# Patient Record
Sex: FEMALE | Race: WHITE | NOT HISPANIC OR LATINO | Employment: FULL TIME | URBAN - NONMETROPOLITAN AREA
[De-identification: names, ages, dates, MRNs, and addresses within clinical notes are randomized per-mention and may not be internally consistent; named-entity substitution may affect disease eponyms.]

---

## 2025-01-15 ENCOUNTER — APPOINTMENT (OUTPATIENT)
Dept: CARDIOLOGY | Facility: HOSPITAL | Age: 50
End: 2025-01-15

## 2025-01-15 ENCOUNTER — APPOINTMENT (OUTPATIENT)
Dept: RADIOLOGY | Facility: HOSPITAL | Age: 50
End: 2025-01-15

## 2025-01-15 ENCOUNTER — HOSPITAL ENCOUNTER (EMERGENCY)
Facility: HOSPITAL | Age: 50
Discharge: ANOTHER HOSPITAL | End: 2025-01-15
Attending: EMERGENCY MEDICINE

## 2025-01-15 VITALS
DIASTOLIC BLOOD PRESSURE: 93 MMHG | BODY MASS INDEX: 46.37 KG/M2 | HEART RATE: 111 BPM | OXYGEN SATURATION: 100 % | TEMPERATURE: 98.2 F | RESPIRATION RATE: 16 BRPM | HEIGHT: 59 IN | WEIGHT: 230 LBS | SYSTOLIC BLOOD PRESSURE: 149 MMHG

## 2025-01-15 DIAGNOSIS — R18.8 OTHER ASCITES: ICD-10-CM

## 2025-01-15 DIAGNOSIS — I50.9 HEART FAILURE, UNSPECIFIED HF CHRONICITY, UNSPECIFIED HEART FAILURE TYPE: ICD-10-CM

## 2025-01-15 DIAGNOSIS — I31.39 PERICARDIAL EFFUSION (HHS-HCC): Primary | ICD-10-CM

## 2025-01-15 DIAGNOSIS — R06.02 SHORTNESS OF BREATH: ICD-10-CM

## 2025-01-15 LAB
ALBUMIN SERPL BCP-MCNC: 3.8 G/DL (ref 3.4–5)
ALP SERPL-CCNC: 81 U/L (ref 33–110)
ALT SERPL W P-5'-P-CCNC: 30 U/L (ref 7–45)
ANION GAP SERPL CALC-SCNC: 11 MMOL/L (ref 10–20)
AORTIC VALVE PEAK VELOCITY: 1.29 M/S
AST SERPL W P-5'-P-CCNC: 20 U/L (ref 9–39)
AV PEAK GRADIENT: 7 MMHG
AVA (PEAK VEL): 1.68 CM2
BASOPHILS # BLD AUTO: 0.04 X10*3/UL (ref 0–0.1)
BASOPHILS NFR BLD AUTO: 0.2 %
BILIRUB SERPL-MCNC: 0.8 MG/DL (ref 0–1.2)
BNP SERPL-MCNC: 937 PG/ML (ref 0–99)
BUN SERPL-MCNC: 20 MG/DL (ref 6–23)
CALCIUM SERPL-MCNC: 8.7 MG/DL (ref 8.6–10.3)
CARDIAC TROPONIN I PNL SERPL HS: 43 NG/L (ref 0–13)
CARDIAC TROPONIN I PNL SERPL HS: 44 NG/L (ref 0–13)
CHLORIDE SERPL-SCNC: 100 MMOL/L (ref 98–107)
CO2 SERPL-SCNC: 35 MMOL/L (ref 21–32)
CREAT SERPL-MCNC: 1.1 MG/DL (ref 0.5–1.05)
EGFRCR SERPLBLD CKD-EPI 2021: 62 ML/MIN/1.73M*2
EJECTION FRACTION APICAL 4 CHAMBER: 36
EJECTION FRACTION: 38 %
EOSINOPHIL # BLD AUTO: 0.06 X10*3/UL (ref 0–0.7)
EOSINOPHIL NFR BLD AUTO: 0.3 %
ERYTHROCYTE [DISTWIDTH] IN BLOOD BY AUTOMATED COUNT: 13.9 % (ref 11.5–14.5)
FLUAV RNA RESP QL NAA+PROBE: NOT DETECTED
FLUBV RNA RESP QL NAA+PROBE: NOT DETECTED
GLUCOSE SERPL-MCNC: 253 MG/DL (ref 74–99)
HCT VFR BLD AUTO: 51.4 % (ref 36–46)
HGB BLD-MCNC: 15.2 G/DL (ref 12–16)
IMM GRANULOCYTES # BLD AUTO: 0.12 X10*3/UL (ref 0–0.7)
IMM GRANULOCYTES NFR BLD AUTO: 0.6 % (ref 0–0.9)
LEFT ATRIUM VOLUME AREA LENGTH INDEX BSA: 36.2 ML/M2
LEFT VENTRICLE INTERNAL DIMENSION DIASTOLE: 4.13 CM (ref 3.5–6)
LEFT VENTRICULAR OUTFLOW TRACT DIAMETER: 2 CM
LYMPHOCYTES # BLD AUTO: 2.51 X10*3/UL (ref 1.2–4.8)
LYMPHOCYTES NFR BLD AUTO: 13.3 %
MAGNESIUM SERPL-MCNC: 2.21 MG/DL (ref 1.6–2.4)
MCH RBC QN AUTO: 26.9 PG (ref 26–34)
MCHC RBC AUTO-ENTMCNC: 29.6 G/DL (ref 32–36)
MCV RBC AUTO: 91 FL (ref 80–100)
MONOCYTES # BLD AUTO: 1.37 X10*3/UL (ref 0.1–1)
MONOCYTES NFR BLD AUTO: 7.2 %
NEUTROPHILS # BLD AUTO: 14.8 X10*3/UL (ref 1.2–7.7)
NEUTROPHILS NFR BLD AUTO: 78.4 %
NRBC BLD-RTO: 0 /100 WBCS (ref 0–0)
PLATELET # BLD AUTO: 351 X10*3/UL (ref 150–450)
POTASSIUM SERPL-SCNC: 4.5 MMOL/L (ref 3.5–5.3)
PROT SERPL-MCNC: 6.2 G/DL (ref 6.4–8.2)
RBC # BLD AUTO: 5.65 X10*6/UL (ref 4–5.2)
RIGHT VENTRICLE FREE WALL PEAK S': 9.36 CM/S
RSV RNA RESP QL NAA+PROBE: NOT DETECTED
SARS-COV-2 RNA RESP QL NAA+PROBE: NOT DETECTED
SODIUM SERPL-SCNC: 141 MMOL/L (ref 136–145)
TRICUSPID ANNULAR PLANE SYSTOLIC EXCURSION: 2 CM
WBC # BLD AUTO: 18.9 X10*3/UL (ref 4.4–11.3)

## 2025-01-15 PROCEDURE — 84443 ASSAY THYROID STIM HORMONE: CPT

## 2025-01-15 PROCEDURE — 2550000001 HC RX 255 CONTRASTS: Performed by: EMERGENCY MEDICINE

## 2025-01-15 PROCEDURE — 71275 CT ANGIOGRAPHY CHEST: CPT | Performed by: RADIOLOGY

## 2025-01-15 PROCEDURE — 2500000002 HC RX 250 W HCPCS SELF ADMINISTERED DRUGS (ALT 637 FOR MEDICARE OP, ALT 636 FOR OP/ED)

## 2025-01-15 PROCEDURE — 87637 SARSCOV2&INF A&B&RSV AMP PRB: CPT | Performed by: EMERGENCY MEDICINE

## 2025-01-15 PROCEDURE — 94664 DEMO&/EVAL PT USE INHALER: CPT

## 2025-01-15 PROCEDURE — 83036 HEMOGLOBIN GLYCOSYLATED A1C: CPT | Mod: GENLAB

## 2025-01-15 PROCEDURE — 83735 ASSAY OF MAGNESIUM: CPT | Performed by: EMERGENCY MEDICINE

## 2025-01-15 PROCEDURE — 93306 TTE W/DOPPLER COMPLETE: CPT | Performed by: INTERNAL MEDICINE

## 2025-01-15 PROCEDURE — 93005 ELECTROCARDIOGRAM TRACING: CPT

## 2025-01-15 PROCEDURE — 83880 ASSAY OF NATRIURETIC PEPTIDE: CPT | Performed by: EMERGENCY MEDICINE

## 2025-01-15 PROCEDURE — 85025 COMPLETE CBC W/AUTO DIFF WBC: CPT | Performed by: EMERGENCY MEDICINE

## 2025-01-15 PROCEDURE — 36415 COLL VENOUS BLD VENIPUNCTURE: CPT | Performed by: EMERGENCY MEDICINE

## 2025-01-15 PROCEDURE — 2500000004 HC RX 250 GENERAL PHARMACY W/ HCPCS (ALT 636 FOR OP/ED): Performed by: EMERGENCY MEDICINE

## 2025-01-15 PROCEDURE — 2500000005 HC RX 250 GENERAL PHARMACY W/O HCPCS: Performed by: EMERGENCY MEDICINE

## 2025-01-15 PROCEDURE — 84484 ASSAY OF TROPONIN QUANT: CPT | Performed by: EMERGENCY MEDICINE

## 2025-01-15 PROCEDURE — 84702 CHORIONIC GONADOTROPIN TEST: CPT

## 2025-01-15 PROCEDURE — 94760 N-INVAS EAR/PLS OXIMETRY 1: CPT

## 2025-01-15 PROCEDURE — C8929 TTE W OR WO FOL WCON,DOPPLER: HCPCS

## 2025-01-15 PROCEDURE — 9420000001 HC RT PATIENT EDUCATION 5 MIN

## 2025-01-15 PROCEDURE — 80053 COMPREHEN METABOLIC PANEL: CPT | Performed by: EMERGENCY MEDICINE

## 2025-01-15 PROCEDURE — 80061 LIPID PANEL: CPT

## 2025-01-15 PROCEDURE — 94640 AIRWAY INHALATION TREATMENT: CPT

## 2025-01-15 PROCEDURE — 71275 CT ANGIOGRAPHY CHEST: CPT

## 2025-01-15 PROCEDURE — 99285 EMERGENCY DEPT VISIT HI MDM: CPT | Mod: 25 | Performed by: EMERGENCY MEDICINE

## 2025-01-15 RX ORDER — IPRATROPIUM BROMIDE AND ALBUTEROL SULFATE 2.5; .5 MG/3ML; MG/3ML
3 SOLUTION RESPIRATORY (INHALATION) ONCE
Status: COMPLETED | OUTPATIENT
Start: 2025-01-15 | End: 2025-01-15

## 2025-01-15 RX ORDER — IPRATROPIUM BROMIDE AND ALBUTEROL SULFATE 2.5; .5 MG/3ML; MG/3ML
SOLUTION RESPIRATORY (INHALATION)
Status: COMPLETED
Start: 2025-01-15 | End: 2025-01-15

## 2025-01-15 RX ADMIN — IPRATROPIUM BROMIDE AND ALBUTEROL SULFATE 3 ML: .5; 3 SOLUTION RESPIRATORY (INHALATION) at 13:54

## 2025-01-15 RX ADMIN — Medication 3 L/MIN: at 13:54

## 2025-01-15 RX ADMIN — PERFLUTREN 3.5 ML OF DILUTION: 6.52 INJECTION, SUSPENSION INTRAVENOUS at 17:20

## 2025-01-15 RX ADMIN — Medication 3 L/MIN: at 14:05

## 2025-01-15 RX ADMIN — IOHEXOL 75 ML: 350 INJECTION, SOLUTION INTRAVENOUS at 15:10

## 2025-01-15 RX ADMIN — IPRATROPIUM BROMIDE AND ALBUTEROL SULFATE 3 ML: 2.5; .5 SOLUTION RESPIRATORY (INHALATION) at 13:54

## 2025-01-15 ASSESSMENT — COLUMBIA-SUICIDE SEVERITY RATING SCALE - C-SSRS
1. IN THE PAST MONTH, HAVE YOU WISHED YOU WERE DEAD OR WISHED YOU COULD GO TO SLEEP AND NOT WAKE UP?: NO
2. HAVE YOU ACTUALLY HAD ANY THOUGHTS OF KILLING YOURSELF?: NO
6. HAVE YOU EVER DONE ANYTHING, STARTED TO DO ANYTHING, OR PREPARED TO DO ANYTHING TO END YOUR LIFE?: NO

## 2025-01-15 ASSESSMENT — PAIN SCALES - GENERAL
PAINLEVEL_OUTOF10: 0 - NO PAIN
PAINLEVEL_OUTOF10: 0 - NO PAIN

## 2025-01-15 ASSESSMENT — PAIN - FUNCTIONAL ASSESSMENT: PAIN_FUNCTIONAL_ASSESSMENT: 0-10

## 2025-01-15 NOTE — ED PROVIDER NOTES
HPI   Chief Complaint   Patient presents with    Shortness of Breath     Patient states she has been more short of breath than usual for a few months. She went to urgent care a few days and was put on steroids. She states it just has gotten worse, history of asthma       HPI  Patient is a 49-year-old female with history of asthma, presenting to the ED today for shortness of breath.  Patient states that she has been having progressively worsening shortness of breath over the past several months.  She had attributed this to her asthma and has been using her albuterol inhaler/nebulizers at home with no improvement.  She then went to urgent care about 1 week ago and was started on a course of prednisone.  She has been taking her steroids as prescribed, with no improvement, so she came to the ED today for further evaluation.  She does note a mild cough.  Patient states that her shortness of breath is worse with lying flat, and that she has had to sleep in a chair for the past several weeks due to her symptoms.  She denies any fever, chest pain, abdominal pain, vomiting, or changes in bowel or bladder habits.  She currently takes Cymbalta, denies any other daily medications.  Patient denies any chance of pregnancy.      Patient History   No past medical history on file.  No past surgical history on file.  No family history on file.  Social History     Tobacco Use    Smoking status: Not on file    Smokeless tobacco: Not on file   Substance Use Topics    Alcohol use: Not on file    Drug use: Not on file       Physical Exam   ED Triage Vitals [01/15/25 1330]   Temperature Heart Rate Respirations BP   36.4 °C (97.6 °F) (!) 122 20 (!) 167/113      Pulse Ox Temp Source Heart Rate Source Patient Position   94 % Temporal Monitor Sitting      BP Location FiO2 (%)     Left arm --       Physical Exam  Vitals and nursing note reviewed.   Constitutional:       Appearance: She is not toxic-appearing.   HENT:      Head: Normocephalic.       Mouth/Throat:      Mouth: Mucous membranes are moist.   Eyes:      Extraocular Movements: Extraocular movements intact.      Conjunctiva/sclera: Conjunctivae normal.   Cardiovascular:      Rate and Rhythm: Regular rhythm. Tachycardia present.   Pulmonary:      Comments: Tachypneic, otherwise no significantly increased work of breathing.  Inspiratory and expiratory wheezing throughout all lung fields  Abdominal:      Palpations: Abdomen is soft.      Comments: Edema of the lower abdomen.  Abdomen appears mildly distended   Musculoskeletal:      Cervical back: Neck supple.      Comments: 2+ pitting edema of the bilateral lower extremities   Skin:     General: Skin is warm and dry.      Capillary Refill: Capillary refill takes less than 2 seconds.   Neurological:      General: No focal deficit present.      Mental Status: She is alert. Mental status is at baseline.           ED Course & MDM   ED Course as of 01/15/25 1749   Wed José Miguel 15, 2025   1416 EKG obtained at 1351, interpreted by myself.  Sinus tachycardia with a ventricular rate of 123, right axis deviation, otherwise normal intervals with no acute ischemic changes [VT]      ED Course User Index  [VT] Stephanie SHANE MD         Diagnoses as of 01/15/25 1749   Pericardial effusion ([facility])   Shortness of breath   Heart failure, unspecified HF chronicity, unspecified heart failure type   Other ascites             No data recorded                             Medical Decision Making  Patient was seen and evaluated for shortness of breath.  Differential diagnosis includes but is not limited to pneumonia, pneumothorax, COPD exacerbation, CHF, PE, ACS, URI, Viral illness, Anemia, Electrolyte abnormality.  On arrival, patient is oxygenating well on room air.  However, patient is tachycardic and tachypneic.  Just transferring from the chair to the bed, patient got significantly short of breath.  She was therefore placed on supplemental oxygen at this time.  Patient is  placed on a cardiac monitor with continuous pulse ox.  Additional labs and imaging are ordered for further evaluation of the patient's symptoms.  Patient is administered a DuoNeb breathing treatment given her history of asthma and wheezing on exam.    CBC shows leukocytosis with WBC of 18.9.  However, patient has been on 1 week of prednisone, likely contributing to her leukocytosis.  CMP shows slightly elevated creatinine of 1.1, otherwise unremarkable.  Magnesium was normal at 2.2.  High-sensitivity troponin is elevated at 43, though stable with repeat of 44.  BNP is elevated at 937.  COVID-19, influenza, and RSV swabs are negative.    Transthoracic Echo (TTE) Complete         CT angio chest for pulmonary embolism   Final Result   1.  No pulmonary embolism identified.   2. Severe cardiomegaly with large pericardial effusion. Reflux of   contrast into the distended hepatic veins suggests elevated right   heart pressures.   3. 3 mm right lower lobe nodule. Mildly enlarged subcarinal lymph   nodes perhaps reactive. Consider follow-up CT chest in 1 year to   document stability.   4. Partially imaged upper abdominal ascites and anasarca.             Signed by: Elpidio Washington 1/15/2025 3:33 PM   Dictation workstation:   [id number]        On reevaluation, patient is resting comfortably in bed.  She remains persistently tachycardic.  She states that her shortness of breath has improved.  She is currently on room air. Patient was informed of their lab and imaging results, and all questions and concerns were answered.  Given her new heart failure with large pericardial effusion on CT, transfer planning for further management was discussed at this time, to which the patient was agreeable.  I initially discussed the case with Dr. Black, cardiology at [facility].  He does not feel that the patient needs primary cardiology admission, but would recommend admission to the medicine service for further workup.  I therefore discussed the  case with Dr. Ruiz, hospitalist at [facility], who accepts patient for transfer.      Procedure  Procedures     Stephanie SHANE MD  01/15/25 8891

## 2025-01-15 NOTE — ED TRIAGE NOTES
Patient states she has been more short of breath than usual for a few months. She went to urgent care a few days and was put on steroids. She states it just has gotten worse, history of asthma

## 2025-01-16 ENCOUNTER — APPOINTMENT (OUTPATIENT)
Dept: CARDIOLOGY | Facility: HOSPITAL | Age: 50
End: 2025-01-16

## 2025-01-16 ENCOUNTER — HOSPITAL ENCOUNTER (INPATIENT)
Facility: HOSPITAL | Age: 50
End: 2025-01-16
Attending: STUDENT IN AN ORGANIZED HEALTH CARE EDUCATION/TRAINING PROGRAM | Admitting: STUDENT IN AN ORGANIZED HEALTH CARE EDUCATION/TRAINING PROGRAM

## 2025-01-16 DIAGNOSIS — I50.20 HFREF (HEART FAILURE WITH REDUCED EJECTION FRACTION): ICD-10-CM

## 2025-01-16 DIAGNOSIS — R60.1 ANASARCA: Primary | ICD-10-CM

## 2025-01-16 DIAGNOSIS — I10 PRIMARY HYPERTENSION: ICD-10-CM

## 2025-01-16 DIAGNOSIS — E11.69 TYPE 2 DIABETES MELLITUS WITH OTHER SPECIFIED COMPLICATION, WITHOUT LONG-TERM CURRENT USE OF INSULIN: ICD-10-CM

## 2025-01-16 DIAGNOSIS — J45.909 ASTHMA, UNSPECIFIED ASTHMA SEVERITY, UNSPECIFIED WHETHER COMPLICATED, UNSPECIFIED WHETHER PERSISTENT (HHS-HCC): ICD-10-CM

## 2025-01-16 DIAGNOSIS — G47.30 SLEEP APNEA, UNSPECIFIED TYPE: ICD-10-CM

## 2025-01-16 DIAGNOSIS — I50.9 HEART FAILURE, UNSPECIFIED HF CHRONICITY, UNSPECIFIED HEART FAILURE TYPE: ICD-10-CM

## 2025-01-16 LAB
ALBUMIN SERPL BCP-MCNC: 3.7 G/DL (ref 3.4–5)
ALBUMIN SERPL BCP-MCNC: 4 G/DL (ref 3.4–5)
AMPHETAMINES UR QL SCN: NORMAL
ANION GAP BLDV CALCULATED.4IONS-SCNC: 3 MMOL/L (ref 10–25)
ANION GAP SERPL CALC-SCNC: 14 MMOL/L (ref 10–20)
ANION GAP SERPL CALC-SCNC: 16 MMOL/L (ref 10–20)
APPEARANCE UR: CLEAR
ATRIAL RATE: 122 BPM
ATRIAL RATE: 123 BPM
B-HCG SERPL-ACNC: <2 MIU/ML
BARBITURATES UR QL SCN: NORMAL
BASE EXCESS BLDV CALC-SCNC: 10.9 MMOL/L (ref -2–3)
BASOPHILS # BLD AUTO: 0.08 X10*3/UL (ref 0–0.1)
BASOPHILS NFR BLD AUTO: 0.5 %
BENZODIAZ UR QL SCN: NORMAL
BILIRUB UR STRIP.AUTO-MCNC: NEGATIVE MG/DL
BODY TEMPERATURE: 37 DEGREES CELSIUS
BUN SERPL-MCNC: 19 MG/DL (ref 6–23)
BUN SERPL-MCNC: 20 MG/DL (ref 6–23)
BZE UR QL SCN: NORMAL
CA-I BLDV-SCNC: 1.07 MMOL/L (ref 1.1–1.33)
CALCIUM SERPL-MCNC: 9.2 MG/DL (ref 8.6–10.6)
CALCIUM SERPL-MCNC: 9.3 MG/DL (ref 8.6–10.6)
CANNABINOIDS UR QL SCN: NORMAL
CHLORIDE BLDV-SCNC: 97 MMOL/L (ref 98–107)
CHLORIDE SERPL-SCNC: 94 MMOL/L (ref 98–107)
CHLORIDE SERPL-SCNC: 99 MMOL/L (ref 98–107)
CHLORIDE UR-SCNC: 137 MMOL/L
CHLORIDE/CREATININE (MMOL/G) IN URINE: 229 MMOL/G CREAT (ref 38–318)
CHOLEST SERPL-MCNC: 155 MG/DL (ref 0–199)
CHOLESTEROL/HDL RATIO: 5.8
CO2 SERPL-SCNC: 35 MMOL/L (ref 21–32)
CO2 SERPL-SCNC: 38 MMOL/L (ref 21–32)
COLOR UR: NORMAL
CREAT SERPL-MCNC: 1.1 MG/DL (ref 0.5–1.05)
CREAT SERPL-MCNC: 1.19 MG/DL (ref 0.5–1.05)
CREAT UR-MCNC: 59.9 MG/DL (ref 20–320)
CREAT UR-MCNC: 59.9 MG/DL (ref 20–320)
EGFRCR SERPLBLD CKD-EPI 2021: 56 ML/MIN/1.73M*2
EGFRCR SERPLBLD CKD-EPI 2021: 62 ML/MIN/1.73M*2
EOSINOPHIL # BLD AUTO: 0.19 X10*3/UL (ref 0–0.7)
EOSINOPHIL NFR BLD AUTO: 1.2 %
ERYTHROCYTE [DISTWIDTH] IN BLOOD BY AUTOMATED COUNT: 13.9 % (ref 11.5–14.5)
EST. AVERAGE GLUCOSE BLD GHB EST-MCNC: 189 MG/DL
FENTANYL+NORFENTANYL UR QL SCN: NORMAL
GLUCOSE BLD MANUAL STRIP-MCNC: 190 MG/DL (ref 74–99)
GLUCOSE BLD MANUAL STRIP-MCNC: 196 MG/DL (ref 74–99)
GLUCOSE BLD MANUAL STRIP-MCNC: 225 MG/DL (ref 74–99)
GLUCOSE BLDV-MCNC: 264 MG/DL (ref 74–99)
GLUCOSE SERPL-MCNC: 153 MG/DL (ref 74–99)
GLUCOSE SERPL-MCNC: 211 MG/DL (ref 74–99)
GLUCOSE UR STRIP.AUTO-MCNC: NORMAL MG/DL
HBA1C MFR BLD: 8.2 %
HCO3 BLDV-SCNC: 40.5 MMOL/L (ref 22–26)
HCT VFR BLD AUTO: 59.7 % (ref 36–46)
HCT VFR BLD EST: 47 % (ref 36–46)
HDLC SERPL-MCNC: 26.8 MG/DL
HGB BLD-MCNC: 17 G/DL (ref 12–16)
HGB BLDV-MCNC: 15.5 G/DL (ref 12–16)
HOLD SPECIMEN: NORMAL
IMM GRANULOCYTES # BLD AUTO: 0.33 X10*3/UL (ref 0–0.7)
IMM GRANULOCYTES NFR BLD AUTO: 2.1 % (ref 0–0.9)
INHALED O2 CONCENTRATION: 28 %
KETONES UR STRIP.AUTO-MCNC: NEGATIVE MG/DL
LACTATE BLDV-SCNC: 1.9 MMOL/L (ref 0.4–2)
LDLC SERPL CALC-MCNC: 92 MG/DL
LEUKOCYTE ESTERASE UR QL STRIP.AUTO: NEGATIVE
LYMPHOCYTES # BLD AUTO: 3.48 X10*3/UL (ref 1.2–4.8)
LYMPHOCYTES NFR BLD AUTO: 21.9 %
MAGNESIUM SERPL-MCNC: 2.26 MG/DL (ref 1.6–2.4)
MAGNESIUM SERPL-MCNC: 2.57 MG/DL (ref 1.6–2.4)
MCH RBC QN AUTO: 27.1 PG (ref 26–34)
MCHC RBC AUTO-ENTMCNC: 28.5 G/DL (ref 32–36)
MCV RBC AUTO: 95 FL (ref 80–100)
METHADONE UR QL SCN: NORMAL
MONOCYTES # BLD AUTO: 1.37 X10*3/UL (ref 0.1–1)
MONOCYTES NFR BLD AUTO: 8.6 %
MUCOUS THREADS #/AREA URNS AUTO: NORMAL /LPF
NEUTROPHILS # BLD AUTO: 10.46 X10*3/UL (ref 1.2–7.7)
NEUTROPHILS NFR BLD AUTO: 65.7 %
NITRITE UR QL STRIP.AUTO: NEGATIVE
NON HDL CHOLESTEROL: 128 MG/DL (ref 0–149)
NRBC BLD-RTO: 0 /100 WBCS (ref 0–0)
OPIATES UR QL SCN: NORMAL
OXYCODONE+OXYMORPHONE UR QL SCN: NORMAL
OXYHGB MFR BLDV: 83.8 % (ref 45–75)
P AXIS: 60 DEGREES
P AXIS: 67 DEGREES
P OFFSET: 205 MS
P OFFSET: 206 MS
P ONSET: 156 MS
P ONSET: 165 MS
PCO2 BLDV: 75 MM HG (ref 41–51)
PCP UR QL SCN: NORMAL
PH BLDV: 7.34 PH (ref 7.33–7.43)
PH UR STRIP.AUTO: 6 [PH]
PHOSPHATE SERPL-MCNC: 4.8 MG/DL (ref 2.5–4.9)
PHOSPHATE SERPL-MCNC: 5.7 MG/DL (ref 2.5–4.9)
PLATELET # BLD AUTO: 326 X10*3/UL (ref 150–450)
PO2 BLDV: 55 MM HG (ref 35–45)
POTASSIUM BLDV-SCNC: 4 MMOL/L (ref 3.5–5.3)
POTASSIUM SERPL-SCNC: 4.5 MMOL/L (ref 3.5–5.3)
POTASSIUM SERPL-SCNC: 4.6 MMOL/L (ref 3.5–5.3)
POTASSIUM UR-SCNC: 23 MMOL/L
POTASSIUM/CREAT UR-RTO: 38 MMOL/G CREAT
PR INTERVAL: 124 MS
PR INTERVAL: 130 MS
PROT UR STRIP.AUTO-MCNC: NORMAL MG/DL
Q ONSET: 221 MS
Q ONSET: 227 MS
QRS COUNT: 20 BEATS
QRS COUNT: 20 BEATS
QRS DURATION: 76 MS
QRS DURATION: 90 MS
QT INTERVAL: 334 MS
QT INTERVAL: 404 MS
QTC CALCULATION(BAZETT): 478 MS
QTC CALCULATION(BAZETT): 575 MS
QTC FREDERICIA: 424 MS
QTC FREDERICIA: 511 MS
R AXIS: 118 DEGREES
R AXIS: 123 DEGREES
RBC # BLD AUTO: 6.28 X10*6/UL (ref 4–5.2)
RBC # UR STRIP.AUTO: NEGATIVE /UL
RBC #/AREA URNS AUTO: NORMAL /HPF
SAO2 % BLDV: 86 % (ref 45–75)
SODIUM BLDV-SCNC: 136 MMOL/L (ref 136–145)
SODIUM SERPL-SCNC: 143 MMOL/L (ref 136–145)
SODIUM SERPL-SCNC: 143 MMOL/L (ref 136–145)
SODIUM UR-SCNC: 117 MMOL/L
SODIUM/CREAT UR-RTO: 195 MMOL/G CREAT
SP GR UR STRIP.AUTO: 1.02
SQUAMOUS #/AREA URNS AUTO: NORMAL /HPF
T AXIS: -2 DEGREES
T AXIS: 33 DEGREES
T OFFSET: 388 MS
T OFFSET: 429 MS
TRIGL SERPL-MCNC: 183 MG/DL (ref 0–149)
TSH SERPL-ACNC: 1.64 MIU/L (ref 0.44–3.98)
UREA/CREAT UR-SRTO: 6.8 G/G CREAT
UROBILINOGEN UR STRIP.AUTO-MCNC: NORMAL MG/DL
UUN UR-MCNC: 407 MG/DL
VENTRICULAR RATE: 122 BPM
VENTRICULAR RATE: 123 BPM
VLDL: 37 MG/DL (ref 0–40)
WBC # BLD AUTO: 15.9 X10*3/UL (ref 4.4–11.3)
WBC #/AREA URNS AUTO: NORMAL /HPF

## 2025-01-16 PROCEDURE — 93010 ELECTROCARDIOGRAM REPORT: CPT | Performed by: INTERNAL MEDICINE

## 2025-01-16 PROCEDURE — 36415 COLL VENOUS BLD VENIPUNCTURE: CPT

## 2025-01-16 PROCEDURE — 99222 1ST HOSP IP/OBS MODERATE 55: CPT

## 2025-01-16 PROCEDURE — 2500000001 HC RX 250 WO HCPCS SELF ADMINISTERED DRUGS (ALT 637 FOR MEDICARE OP)

## 2025-01-16 PROCEDURE — 84132 ASSAY OF SERUM POTASSIUM: CPT

## 2025-01-16 PROCEDURE — 1210000001 HC SEMI-PRIVATE ROOM DAILY

## 2025-01-16 PROCEDURE — 82947 ASSAY GLUCOSE BLOOD QUANT: CPT

## 2025-01-16 PROCEDURE — 2500000002 HC RX 250 W HCPCS SELF ADMINISTERED DRUGS (ALT 637 FOR MEDICARE OP, ALT 636 FOR OP/ED)

## 2025-01-16 PROCEDURE — 2500000004 HC RX 250 GENERAL PHARMACY W/ HCPCS (ALT 636 FOR OP/ED)

## 2025-01-16 PROCEDURE — 80307 DRUG TEST PRSMV CHEM ANLYZR: CPT

## 2025-01-16 PROCEDURE — 83735 ASSAY OF MAGNESIUM: CPT

## 2025-01-16 PROCEDURE — 82436 ASSAY OF URINE CHLORIDE: CPT

## 2025-01-16 PROCEDURE — 93005 ELECTROCARDIOGRAM TRACING: CPT

## 2025-01-16 PROCEDURE — 84540 ASSAY OF URINE/UREA-N: CPT

## 2025-01-16 PROCEDURE — 85025 COMPLETE CBC W/AUTO DIFF WBC: CPT

## 2025-01-16 PROCEDURE — 97161 PT EVAL LOW COMPLEX 20 MIN: CPT | Mod: GP

## 2025-01-16 PROCEDURE — 2500000005 HC RX 250 GENERAL PHARMACY W/O HCPCS

## 2025-01-16 PROCEDURE — 80069 RENAL FUNCTION PANEL: CPT

## 2025-01-16 PROCEDURE — 81001 URINALYSIS AUTO W/SCOPE: CPT

## 2025-01-16 PROCEDURE — 99223 1ST HOSP IP/OBS HIGH 75: CPT

## 2025-01-16 RX ORDER — ACETAMINOPHEN 325 MG/1
975 TABLET ORAL EVERY 6 HOURS PRN
Status: DISCONTINUED | OUTPATIENT
Start: 2025-01-16 | End: 2025-01-23 | Stop reason: HOSPADM

## 2025-01-16 RX ORDER — DEXTROSE 50 % IN WATER (D50W) INTRAVENOUS SYRINGE
12.5
Status: DISCONTINUED | OUTPATIENT
Start: 2025-01-16 | End: 2025-01-23 | Stop reason: HOSPADM

## 2025-01-16 RX ORDER — FUROSEMIDE 10 MG/ML
40 INJECTION INTRAMUSCULAR; INTRAVENOUS ONCE
Status: COMPLETED | OUTPATIENT
Start: 2025-01-16 | End: 2025-01-16

## 2025-01-16 RX ORDER — PREDNISONE 5 MG/1
10 TABLET ORAL DAILY
Status: COMPLETED | OUTPATIENT
Start: 2025-01-19 | End: 2025-01-21

## 2025-01-16 RX ORDER — ALBUTEROL SULFATE 0.83 MG/ML
2.5 SOLUTION RESPIRATORY (INHALATION) EVERY 6 HOURS PRN
Status: DISCONTINUED | OUTPATIENT
Start: 2025-01-16 | End: 2025-01-16

## 2025-01-16 RX ORDER — PREDNISONE 5 MG/1
5 TABLET ORAL DAILY
Status: DISCONTINUED | OUTPATIENT
Start: 2025-01-22 | End: 2025-01-23 | Stop reason: HOSPADM

## 2025-01-16 RX ORDER — ALBUTEROL SULFATE 0.83 MG/ML
2.5 SOLUTION RESPIRATORY (INHALATION) EVERY 4 HOURS PRN
Status: DISCONTINUED | OUTPATIENT
Start: 2025-01-16 | End: 2025-01-23 | Stop reason: HOSPADM

## 2025-01-16 RX ORDER — HYDRALAZINE HYDROCHLORIDE 10 MG/1
10 TABLET, FILM COATED ORAL EVERY 8 HOURS PRN
Status: DISCONTINUED | OUTPATIENT
Start: 2025-01-16 | End: 2025-01-23 | Stop reason: HOSPADM

## 2025-01-16 RX ORDER — PREDNISONE 5 MG/1
15 TABLET ORAL DAILY
Status: COMPLETED | OUTPATIENT
Start: 2025-01-17 | End: 2025-01-18

## 2025-01-16 RX ORDER — DULOXETIN HYDROCHLORIDE 30 MG/1
60 CAPSULE, DELAYED RELEASE ORAL DAILY
Status: DISCONTINUED | OUTPATIENT
Start: 2025-01-16 | End: 2025-01-17

## 2025-01-16 RX ORDER — FUROSEMIDE 10 MG/ML
40 INJECTION INTRAMUSCULAR; INTRAVENOUS ONCE
Status: COMPLETED | OUTPATIENT
Start: 2025-01-16 | End: 2025-01-17

## 2025-01-16 RX ORDER — PREDNISONE 5 MG/1
15 TABLET ORAL DAILY
Status: COMPLETED | OUTPATIENT
Start: 2025-01-16 | End: 2025-01-16

## 2025-01-16 RX ORDER — DEXTROSE 50 % IN WATER (D50W) INTRAVENOUS SYRINGE
25
Status: DISCONTINUED | OUTPATIENT
Start: 2025-01-16 | End: 2025-01-23 | Stop reason: HOSPADM

## 2025-01-16 RX ORDER — ATORVASTATIN CALCIUM 40 MG/1
40 TABLET, FILM COATED ORAL NIGHTLY
Status: DISCONTINUED | OUTPATIENT
Start: 2025-01-16 | End: 2025-01-23 | Stop reason: HOSPADM

## 2025-01-16 RX ORDER — SACUBITRIL AND VALSARTAN 24; 26 MG/1; MG/1
0.5 TABLET, FILM COATED ORAL 2 TIMES DAILY
Status: DISCONTINUED | OUTPATIENT
Start: 2025-01-16 | End: 2025-01-17

## 2025-01-16 RX ORDER — HYDRALAZINE HYDROCHLORIDE 10 MG/1
10 TABLET, FILM COATED ORAL ONCE
Status: COMPLETED | OUTPATIENT
Start: 2025-01-16 | End: 2025-01-16

## 2025-01-16 RX ORDER — INSULIN LISPRO 100 [IU]/ML
0-5 INJECTION, SOLUTION INTRAVENOUS; SUBCUTANEOUS
Status: DISCONTINUED | OUTPATIENT
Start: 2025-01-16 | End: 2025-01-23 | Stop reason: HOSPADM

## 2025-01-16 RX ORDER — CARVEDILOL 6.25 MG/1
6.25 TABLET ORAL 2 TIMES DAILY
Status: DISCONTINUED | OUTPATIENT
Start: 2025-01-16 | End: 2025-01-23 | Stop reason: HOSPADM

## 2025-01-16 RX ORDER — ENOXAPARIN SODIUM 100 MG/ML
40 INJECTION SUBCUTANEOUS EVERY 12 HOURS SCHEDULED
Status: DISCONTINUED | OUTPATIENT
Start: 2025-01-16 | End: 2025-01-23 | Stop reason: HOSPADM

## 2025-01-16 RX ADMIN — ENOXAPARIN SODIUM 40 MG: 100 INJECTION SUBCUTANEOUS at 21:09

## 2025-01-16 RX ADMIN — Medication 2 L/MIN: at 21:20

## 2025-01-16 RX ADMIN — DULOXETINE HYDROCHLORIDE 60 MG: 60 CAPSULE, DELAYED RELEASE ORAL at 09:18

## 2025-01-16 RX ADMIN — FUROSEMIDE 40 MG: 10 INJECTION, SOLUTION INTRAVENOUS at 05:33

## 2025-01-16 RX ADMIN — ENOXAPARIN SODIUM 40 MG: 100 INJECTION SUBCUTANEOUS at 09:18

## 2025-01-16 RX ADMIN — PREDNISONE 15 MG: 5 TABLET ORAL at 11:03

## 2025-01-16 RX ADMIN — Medication 2 L/MIN: at 21:19

## 2025-01-16 RX ADMIN — SACUBITRIL AND VALSARTAN 0.5 TABLET: 24; 26 TABLET, FILM COATED ORAL at 21:09

## 2025-01-16 RX ADMIN — ATORVASTATIN CALCIUM 40 MG: 40 TABLET, FILM COATED ORAL at 21:09

## 2025-01-16 RX ADMIN — CARVEDILOL 6.25 MG: 6.25 TABLET, FILM COATED ORAL at 14:32

## 2025-01-16 RX ADMIN — INSULIN LISPRO 1 UNITS: 100 INJECTION, SOLUTION INTRAVENOUS; SUBCUTANEOUS at 17:41

## 2025-01-16 RX ADMIN — CARVEDILOL 6.25 MG: 6.25 TABLET, FILM COATED ORAL at 21:09

## 2025-01-16 RX ADMIN — SACUBITRIL AND VALSARTAN 0.5 TABLET: 24; 26 TABLET, FILM COATED ORAL at 09:18

## 2025-01-16 RX ADMIN — HYDRALAZINE HYDROCHLORIDE 10 MG: 10 TABLET ORAL at 09:53

## 2025-01-16 RX ADMIN — FUROSEMIDE 40 MG: 10 INJECTION, SOLUTION INTRAVENOUS at 13:45

## 2025-01-16 SDOH — ECONOMIC STABILITY: FOOD INSECURITY: WITHIN THE PAST 12 MONTHS, YOU WORRIED THAT YOUR FOOD WOULD RUN OUT BEFORE YOU GOT THE MONEY TO BUY MORE.: NEVER TRUE

## 2025-01-16 SDOH — ECONOMIC STABILITY: INCOME INSECURITY: IN THE PAST 12 MONTHS HAS THE ELECTRIC, GAS, OIL, OR WATER COMPANY THREATENED TO SHUT OFF SERVICES IN YOUR HOME?: NO

## 2025-01-16 SDOH — HEALTH STABILITY: PHYSICAL HEALTH: ON AVERAGE, HOW MANY MINUTES DO YOU ENGAGE IN EXERCISE AT THIS LEVEL?: 0 MIN

## 2025-01-16 SDOH — ECONOMIC STABILITY: HOUSING INSECURITY: IN THE PAST 12 MONTHS, HOW MANY TIMES HAVE YOU MOVED WHERE YOU WERE LIVING?: 1

## 2025-01-16 SDOH — ECONOMIC STABILITY: HOUSING INSECURITY: AT ANY TIME IN THE PAST 12 MONTHS, WERE YOU HOMELESS OR LIVING IN A SHELTER (INCLUDING NOW)?: NO

## 2025-01-16 SDOH — HEALTH STABILITY: PHYSICAL HEALTH: ON AVERAGE, HOW MANY DAYS PER WEEK DO YOU ENGAGE IN MODERATE TO STRENUOUS EXERCISE (LIKE A BRISK WALK)?: 0 DAYS

## 2025-01-16 SDOH — SOCIAL STABILITY: SOCIAL NETWORK: HOW OFTEN DO YOU ATTEND MEETINGS OF THE CLUBS OR ORGANIZATIONS YOU BELONG TO?: NEVER

## 2025-01-16 SDOH — HEALTH STABILITY: PHYSICAL HEALTH
HOW OFTEN DO YOU NEED TO HAVE SOMEONE HELP YOU WHEN YOU READ INSTRUCTIONS, PAMPHLETS, OR OTHER WRITTEN MATERIAL FROM YOUR DOCTOR OR PHARMACY?: NEVER

## 2025-01-16 SDOH — ECONOMIC STABILITY: FOOD INSECURITY: HOW HARD IS IT FOR YOU TO PAY FOR THE VERY BASICS LIKE FOOD, HOUSING, MEDICAL CARE, AND HEATING?: NOT HARD AT ALL

## 2025-01-16 SDOH — SOCIAL STABILITY: SOCIAL NETWORK
DO YOU BELONG TO ANY CLUBS OR ORGANIZATIONS SUCH AS CHURCH GROUPS, UNIONS, FRATERNAL OR ATHLETIC GROUPS, OR SCHOOL GROUPS?: NO

## 2025-01-16 SDOH — ECONOMIC STABILITY: HOUSING INSECURITY: IN THE LAST 12 MONTHS, WAS THERE A TIME WHEN YOU WERE NOT ABLE TO PAY THE MORTGAGE OR RENT ON TIME?: NO

## 2025-01-16 SDOH — SOCIAL STABILITY: SOCIAL INSECURITY: HAVE YOU HAD THOUGHTS OF HARMING ANYONE ELSE?: NO

## 2025-01-16 SDOH — SOCIAL STABILITY: SOCIAL INSECURITY: ARE YOU OR HAVE YOU BEEN THREATENED OR ABUSED PHYSICALLY, EMOTIONALLY, OR SEXUALLY BY ANYONE?: NO

## 2025-01-16 SDOH — HEALTH STABILITY: MENTAL HEALTH
DO YOU FEEL STRESS - TENSE, RESTLESS, NERVOUS, OR ANXIOUS, OR UNABLE TO SLEEP AT NIGHT BECAUSE YOUR MIND IS TROUBLED ALL THE TIME - THESE DAYS?: NOT AT ALL

## 2025-01-16 SDOH — SOCIAL STABILITY: SOCIAL INSECURITY
WITHIN THE LAST YEAR, HAVE YOU BEEN KICKED, HIT, SLAPPED, OR OTHERWISE PHYSICALLY HURT BY YOUR PARTNER OR EX-PARTNER?: NO

## 2025-01-16 SDOH — SOCIAL STABILITY: SOCIAL INSECURITY: WITHIN THE LAST YEAR, HAVE YOU BEEN AFRAID OF YOUR PARTNER OR EX-PARTNER?: NO

## 2025-01-16 SDOH — SOCIAL STABILITY: SOCIAL NETWORK: IN A TYPICAL WEEK, HOW MANY TIMES DO YOU TALK ON THE PHONE WITH FAMILY, FRIENDS, OR NEIGHBORS?: NEVER

## 2025-01-16 SDOH — ECONOMIC STABILITY: FOOD INSECURITY: WITHIN THE PAST 12 MONTHS, THE FOOD YOU BOUGHT JUST DIDN'T LAST AND YOU DIDN'T HAVE MONEY TO GET MORE.: NEVER TRUE

## 2025-01-16 SDOH — HEALTH STABILITY: MENTAL HEALTH: HOW OFTEN DO YOU HAVE A DRINK CONTAINING ALCOHOL?: NEVER

## 2025-01-16 SDOH — SOCIAL STABILITY: SOCIAL INSECURITY: ABUSE: ADULT

## 2025-01-16 SDOH — SOCIAL STABILITY: SOCIAL INSECURITY: WITHIN THE LAST YEAR, HAVE YOU BEEN HUMILIATED OR EMOTIONALLY ABUSED IN OTHER WAYS BY YOUR PARTNER OR EX-PARTNER?: NO

## 2025-01-16 SDOH — SOCIAL STABILITY: SOCIAL INSECURITY: ARE YOU MARRIED, WIDOWED, DIVORCED, SEPARATED, NEVER MARRIED, OR LIVING WITH A PARTNER?: NEVER MARRIED

## 2025-01-16 SDOH — SOCIAL STABILITY: SOCIAL INSECURITY: ARE THERE ANY APPARENT SIGNS OF INJURIES/BEHAVIORS THAT COULD BE RELATED TO ABUSE/NEGLECT?: NO

## 2025-01-16 SDOH — SOCIAL STABILITY: SOCIAL NETWORK: HOW OFTEN DO YOU ATTEND CHURCH OR RELIGIOUS SERVICES?: NEVER

## 2025-01-16 SDOH — SOCIAL STABILITY: SOCIAL INSECURITY: DOES ANYONE TRY TO KEEP YOU FROM HAVING/CONTACTING OTHER FRIENDS OR DOING THINGS OUTSIDE YOUR HOME?: NO

## 2025-01-16 SDOH — HEALTH STABILITY: MENTAL HEALTH: HOW OFTEN DO YOU HAVE SIX OR MORE DRINKS ON ONE OCCASION?: NEVER

## 2025-01-16 SDOH — HEALTH STABILITY: MENTAL HEALTH: HOW MANY DRINKS CONTAINING ALCOHOL DO YOU HAVE ON A TYPICAL DAY WHEN YOU ARE DRINKING?: PATIENT DOES NOT DRINK

## 2025-01-16 SDOH — SOCIAL STABILITY: SOCIAL INSECURITY
WITHIN THE LAST YEAR, HAVE YOU BEEN RAPED OR FORCED TO HAVE ANY KIND OF SEXUAL ACTIVITY BY YOUR PARTNER OR EX-PARTNER?: NO

## 2025-01-16 SDOH — SOCIAL STABILITY: SOCIAL INSECURITY: DO YOU FEEL ANYONE HAS EXPLOITED OR TAKEN ADVANTAGE OF YOU FINANCIALLY OR OF YOUR PERSONAL PROPERTY?: NO

## 2025-01-16 SDOH — ECONOMIC STABILITY: TRANSPORTATION INSECURITY: IN THE PAST 12 MONTHS, HAS LACK OF TRANSPORTATION KEPT YOU FROM MEDICAL APPOINTMENTS OR FROM GETTING MEDICATIONS?: NO

## 2025-01-16 SDOH — SOCIAL STABILITY: SOCIAL INSECURITY: HAVE YOU HAD ANY THOUGHTS OF HARMING ANYONE ELSE?: NO

## 2025-01-16 SDOH — SOCIAL STABILITY: SOCIAL NETWORK: HOW OFTEN DO YOU GET TOGETHER WITH FRIENDS OR RELATIVES?: NEVER

## 2025-01-16 SDOH — SOCIAL STABILITY: SOCIAL INSECURITY: DO YOU FEEL UNSAFE GOING BACK TO THE PLACE WHERE YOU ARE LIVING?: NO

## 2025-01-16 SDOH — SOCIAL STABILITY: SOCIAL INSECURITY: HAS ANYONE EVER THREATENED TO HURT YOUR FAMILY OR YOUR PETS?: NO

## 2025-01-16 ASSESSMENT — COGNITIVE AND FUNCTIONAL STATUS - GENERAL
DAILY ACTIVITIY SCORE: 24
MOVING TO AND FROM BED TO CHAIR: A LITTLE
DAILY ACTIVITIY SCORE: 24
WALKING IN HOSPITAL ROOM: A LITTLE
MOBILITY SCORE: 24
MOBILITY SCORE: 24
DAILY ACTIVITIY SCORE: 24
MOBILITY SCORE: 24
PATIENT BASELINE BEDBOUND: NO
MOBILITY SCORE: 24
DAILY ACTIVITIY SCORE: 24
DAILY ACTIVITIY SCORE: 24
TURNING FROM BACK TO SIDE WHILE IN FLAT BAD: A LITTLE
CLIMB 3 TO 5 STEPS WITH RAILING: A LITTLE
MOBILITY SCORE: 18
STANDING UP FROM CHAIR USING ARMS: A LITTLE
MOBILITY SCORE: 24
MOVING FROM LYING ON BACK TO SITTING ON SIDE OF FLAT BED WITH BEDRAILS: A LITTLE
DAILY ACTIVITIY SCORE: 24
MOBILITY SCORE: 24

## 2025-01-16 ASSESSMENT — ACTIVITIES OF DAILY LIVING (ADL)
LACK_OF_TRANSPORTATION: NO
ADL_ASSISTANCE: INDEPENDENT
PATIENT'S MEMORY ADEQUATE TO SAFELY COMPLETE DAILY ACTIVITIES?: YES
ADEQUATE_TO_COMPLETE_ADL: YES
DRESSING YOURSELF: INDEPENDENT
JUDGMENT_ADEQUATE_SAFELY_COMPLETE_DAILY_ACTIVITIES: YES
HEARING - RIGHT EAR: FUNCTIONAL
LACK_OF_TRANSPORTATION: NO
BATHING: INDEPENDENT
WALKS IN HOME: INDEPENDENT
FEEDING YOURSELF: INDEPENDENT
GROOMING: INDEPENDENT
TOILETING: INDEPENDENT
HEARING - LEFT EAR: FUNCTIONAL

## 2025-01-16 ASSESSMENT — LIFESTYLE VARIABLES
HOW OFTEN DO YOU HAVE A DRINK CONTAINING ALCOHOL: NEVER
HOW OFTEN DO YOU HAVE 6 OR MORE DRINKS ON ONE OCCASION: NEVER
AUDIT-C TOTAL SCORE: 0
SKIP TO QUESTIONS 9-10: 1
PRESCIPTION_ABUSE_PAST_12_MONTHS: NO
AUDIT-C TOTAL SCORE: 0
SKIP TO QUESTIONS 9-10: 1
HOW MANY STANDARD DRINKS CONTAINING ALCOHOL DO YOU HAVE ON A TYPICAL DAY: PATIENT DOES NOT DRINK
AUDIT-C TOTAL SCORE: 0
SUBSTANCE_ABUSE_PAST_12_MONTHS: NO

## 2025-01-16 ASSESSMENT — PAIN - FUNCTIONAL ASSESSMENT
PAIN_FUNCTIONAL_ASSESSMENT: 0-10

## 2025-01-16 ASSESSMENT — COLUMBIA-SUICIDE SEVERITY RATING SCALE - C-SSRS
2. HAVE YOU ACTUALLY HAD ANY THOUGHTS OF KILLING YOURSELF?: NO
1. IN THE PAST MONTH, HAVE YOU WISHED YOU WERE DEAD OR WISHED YOU COULD GO TO SLEEP AND NOT WAKE UP?: NO
6. HAVE YOU EVER DONE ANYTHING, STARTED TO DO ANYTHING, OR PREPARED TO DO ANYTHING TO END YOUR LIFE?: NO

## 2025-01-16 ASSESSMENT — PATIENT HEALTH QUESTIONNAIRE - PHQ9
SUM OF ALL RESPONSES TO PHQ9 QUESTIONS 1 & 2: 4
1. LITTLE INTEREST OR PLEASURE IN DOING THINGS: MORE THAN HALF THE DAYS
2. FEELING DOWN, DEPRESSED OR HOPELESS: MORE THAN HALF THE DAYS

## 2025-01-16 ASSESSMENT — PAIN SCALES - GENERAL
PAINLEVEL_OUTOF10: 0 - NO PAIN

## 2025-01-16 NOTE — CARE PLAN
The patient's goals for the shift include Safety    The clinical goals for the shift include Patient will remain HDS during shift    Other goals include   Problem: Skin  Goal: Decreased wound size/increased tissue granulation at next dressing change  Outcome: Progressing  Goal: Participates in plan/prevention/treatment measures  Outcome: Progressing  Goal: Prevent/manage excess moisture  Outcome: Progressing  Goal: Prevent/minimize sheer/friction injuries  Outcome: Progressing  Goal: Promote/optimize nutrition  Outcome: Progressing  Goal: Promote skin healing  Outcome: Progressing     Problem: Safety - Adult  Goal: Free from fall injury  Outcome: Progressing     Problem: Discharge Planning  Goal: Discharge to home or other facility with appropriate resources  Outcome: Progressing     Problem: Chronic Conditions and Co-morbidities  Goal: Patient's chronic conditions and co-morbidity symptoms are monitored and maintained or improved  Outcome: Progressing

## 2025-01-16 NOTE — PROGRESS NOTES
Physical Therapy    Physical Therapy Evaluation    Patient Name: Samina Park  MRN: [id number]  Department: [facility]  Room: 6071/6071-B  Today's Date: 1/16/2025   Time Calculation  Start Time: 1110  Stop Time: 1142  Time Calculation (min): 32 min    Assessment/Plan   PT Assessment  PT Assessment Results: Decreased endurance, Impaired balance, Decreased mobility  Rehab Prognosis: Good  Barriers to Discharge Home: No anticipated barriers  Evaluation/Treatment Tolerance: Patient limited by fatigue  Medical Staff Made Aware: Yes  Strengths: Attitude of self, Coping skills  Barriers to Participation: Comorbidities  End of Session Communication: Bedside nurse  Assessment Comment: Pt presented with a slight unsteadiness without an assistive device with increased SOB, but safe and appropriate for home with family assistance.  End of Session Patient Position: Bed, 3 rail up, Alarm off, not on at start of session  IP OR SWING BED PT PLAN  Inpatient or Swing Bed: Inpatient  PT Plan  Treatment/Interventions: Bed mobility, Transfer training, Gait training, Stair training, Balance training, Strengthening, Endurance training, Therapeutic exercise, Therapeutic activity, Home exercise program  PT Plan: Ongoing PT  PT Frequency: 3 times per week  PT Discharge Recommendations: No PT needed after discharge  Equipment Recommended upon Discharge:  (none)    Subjective   General Visit Information:  General  Reason for Referral: Acute on chronic dyspnea  Past Medical History Relevant to Rehab: HTN, asthma, and depression  Prior to Session Communication: Bedside nurse  Patient Position Received: Up in bathroom  Preferred Learning Style: verbal, visual, written  General Comment: Pt was pleasant, cooperative and willing to participate in therapy.  Home Living:  Home Living  Type of Home: House  Lives With: Adult children, Dependent children, Siblings (Brother, adult son and dependent daughter)  Home Adaptive Equipment: Walker rolling or  standard, Cane  Home Layout: Multi-level, Laundry in basement, Stairs to alternate level with rails  Alternate Level Stairs-Rails: Left  Alternate Level Stairs-Number of Steps: 12  Home Access: Stairs to enter with rails  Entrance Stairs-Rails: Both  Entrance Stairs-Number of Steps: 4  Bathroom Shower/Tub: Tub/shower unit  Bathroom Toilet: Standard  Bathroom Equipment: Grab bars in shower  Prior Level of Function:  Prior Function Per Pt/Caregiver Report  Level of [place]: Independent with ADLs and functional transfers, Independent with homemaking with ambulation  Receives Help From: Family  ADL Assistance: Independent  Homemaking Assistance: Independent  Ambulatory Assistance: Independent  Vocational: Full time employment  Leisure: Socialize with family  Hand Dominance: Right  Prior Function Comments: Pt was independent with all mobility without an assistive device in the household and in the community.  Precautions:  Precautions  Hearing/Visual Limitations: Hearing and vision WFL  Medical Precautions: Fall precautions, Oxygen therapy device and L/min  Precautions Comment: Pt in compliance with precautions throughout therapy session.     Vital Signs (Past 2hrs)        Date/Time Vitals Session Patient Position Pulse Resp SpO2 BP MAP (mmHg)    01/16/25 1305 --  --  115  20  95 %  139/97  111                   Vital Signs Comment: Tachycardia     Objective   Pain:  Pain Assessment  Pain Assessment: 0-10  0-10 (Numeric) Pain Score: 0 - No pain  Cognition:  Cognition  Overall Cognitive Status: Within Functional Limits  Orientation Level: Oriented X4  Following Commands: Follows one step commands without difficulty  Safety Judgment: Good awareness of safety precautions    General Assessments:  General Observation  General Observation: Pt presented with a slightly unsteady gait without an assistive device and increased SOB.     Activity Tolerance  Endurance: Decreased tolerance for upright  activites    Sensation  Light Touch: No apparent deficits    Strength  Strength Comments: WFL  Perception  Inattention/Neglect: Appears intact    Coordination  Movements are Fluid and Coordinated: Yes  Coordination Comment: WFL    Postural Control  Postural Control: Within Functional Limits  Posture Comment: Pt presented with good sitting posture and good standing posture without an assistive device.    Static Sitting Balance  Static Sitting-Balance Support: No upper extremity supported, Feet supported  Static Sitting-Level of Assistance: Independent  Static Sitting-Comment/Number of Minutes: Sitting EOB  Dynamic Sitting Balance  Dynamic Sitting-Balance Support: No upper extremity supported, Feet supported  Dynamic Sitting-Level of Assistance: Independent  Dynamic Sitting-Comments: Sitting EOB    Static Standing Balance  Static Standing-Balance Support: No upper extremity supported  Static Standing-Level of Assistance: Close supervision  Static Standing-Comment/Number of Minutes: no device  Dynamic Standing Balance  Dynamic Standing-Balance Support: No upper extremity supported  Dynamic Standing-Level of Assistance: Close supervision  Dynamic Standing-Comments: no device  Functional Assessments:  Bed Mobility  Bed Mobility: Yes  Bed Mobility 1  Bed Mobility 1: Sitting to supine  Level of Assistance 1: Close supervision  Bed Mobility Comments 1: lateral roll technique  Bed Mobility 2  Bed Mobility  2: Supine to sitting  Level of Assistance 2: Close supervision  Bed Mobility Comments 2: lateral roll technique    Transfers  Transfer: Yes  Transfer 1  Transfer From 1: Sit to  Transfer to 1: Stand  Transfer Device 1:  (no device)  Transfer Level of Assistance 1: Close supervision  Transfers 2  Transfer From 2: Stand to  Transfer to 2: Sit  Transfer Device 2:  (no device)  Transfer Level of Assistance 2: Close supervision  Transfers 3  Transfer From 3: Toilet to  Transfer to 3: Bed  Transfer Device 3:  (no  device)  Transfer Level of Assistance 3: Close supervision    Ambulation/Gait Training  Ambulation/Gait Training Performed: Yes  Ambulation/Gait Training 1  Surface 1: Level tile  Device 1: No device  Assistance 1: Close supervision  Quality of Gait 1: Decreased step length (slightly unsteady, decreased joel, decreased endurance, increased SOB)  Comments/Distance (ft) 1: 15ft    Stairs  Stairs: No  Extremity/Trunk Assessments:  Cervical Spine   Cervical Spine: Within Functional Limits  Lumbar Spine   Lumbar Spine : Within Functional Limits    RUE   RUE : Within Functional Limits  LUE   LUE: Within Functional Limits  RLE   RLE : Within Functional Limits  LLE   LLE : Within Functional Limits  Outcome Measures:  [facility] Basic Mobility  Turning from your back to your side while in a flat bed without using bedrails: A little  Moving from lying on your back to sitting on the side of a flat bed without using bedrails: A little  Moving to and from bed to chair (including a wheelchair): A little  Standing up from a chair using your arms (e.g. wheelchair or bedside chair): A little  To walk in hospital room: A little  Climbing 3-5 steps with railing: A little  Basic Mobility - Total Score: 18    Encounter Problems       Encounter Problems (Active)       Balance       STG - Maintains independent dynamic standing balance without upper extremity support (Progressing)       Start:  01/16/25    Expected End:  01/30/25            STG - Maintains independent static standing balance without upper extremity support. (Progressing)       Start:  01/16/25    Expected End:  01/30/25               Mobility       STG - Patient will ambulate 125ft, independently with no device. (Progressing)       Start:  01/16/25    Expected End:  01/30/25            STG - Patient will ascend and descend a flight of stairs, independently using one rail. (Progressing)       Start:  01/16/25    Expected End:  01/30/25               PT Transfers       STG -  Transfer from bed to chair, independently with no device. (Progressing)       Start:  01/16/25    Expected End:  01/30/25            STG - Patient to transfer to and from sit to supine, independently. (Progressing)       Start:  01/16/25    Expected End:  01/30/25            STG - Patient will transfer sit to and from stand, independently with no device. (Progressing)       Start:  01/16/25    Expected End:  01/30/25                   Education Documentation  Body Mechanics, taught by Yuriy Gatica PT at 1/16/2025  1:09 PM.  Learner: Patient  Readiness: Acceptance  Method: Explanation, Demonstration  Response: Verbalizes Understanding, Demonstrated Understanding  Comment: Pt received education on safe mobility.    Home Exercise Program, taught by Yuriy Gatica PT at 1/16/2025  1:09 PM.  Learner: Patient  Readiness: Acceptance  Method: Explanation, Demonstration  Response: Verbalizes Understanding, Demonstrated Understanding  Comment: Pt received education on safe mobility.    Mobility Training, taught by Yuriy Gatica PT at 1/16/2025  1:09 PM.  Learner: Patient  Readiness: Acceptance  Method: Explanation, Demonstration  Response: Verbalizes Understanding, Demonstrated Understanding  Comment: Pt received education on safe mobility.    Education Comments  No comments found.

## 2025-01-16 NOTE — CONSULTS
Reason For Consult  New HFrEF    History Of Present Illness  Samina Park is a 49 y.o. female with PMH significant for HTN, HLD, obesity, asthma, depression.  Patient presented as a transfer from [facility] where she presented with acute on chronic dyspnea worsening over the last few months which she initially attributed to poorly controlled asthma.  Treated with several courses of glucocorticoids with no improvement and had recently been taking prescribed by urgent care earlier this week.  Patient noted that she has not had insurance and had been taking steroids (prednisone) that a friend had leftover, and has been taking steroids almost daily for 2 months.  Her acute on chronic dyspnea continued to worsen while taking the steroids to the point where patient was very weak ambulate around her house.  She also notes that she has a flight of stairs in her house and she is only able to make it up 5 steps before she has to stop and take a break to catch her breath for some time. She also endorsed orthopnea, PND, wheezing, dyspnea, cough productive of clear sputum, severe bilateral lower extremity edema weight gain.  Denied chest pain, palpitations, syncope, lightheadedness, dizziness fevers, chills, N/V/D/hematuria, melanotic stools.  Cardiology was consulted after an echocardiogram showed new onset HFrEF with an EF of 35-40%, in the setting of ADHF.     In the ED:  -Vitals: Temp 97.5 F, , RR 15 //120, SpO2 97% on supplemental oxygen via NC  -Labs: CMP: Glucose 253, bicarbonate 35, creatinine 1.10, troponin 43-44,  otherwise unremarkable             Hemoglobin A1c 8.2, lipid panel significant for triglycerides 183 otherwise unremarkable             CBC WBC 18.9 (ANC 14.80), otherwise unremarkable             COVID, influenza, RSV PCR all negative             UA and UDS unremarkable  -Imaging: CT angio for pulmonary embolus: No pulmonary [place] identified, severe cardiomegaly with large  pericardial effusion.  Reflux of contrast into the distended hepatic veins suggestive of elevated right heart pressures, 3 mm right lower lobe nodule, mildly enlarged subcarinal lymph nodes perhaps reactive, partially enlarged upper abdominal ascites and anasarca.                   TTE: 1. Poorly visualized anatomical structures due to suboptimal image quality.      2. Left ventricular ejection fraction is moderately decreased, by visual estimate at 35-40%.   3. Spectral Doppler shows an abnormal pattern of left ventricular diastolic filling.   4. There is normal right ventricular global systolic function.   5. The left atrium is moderately dilated.   6. There is a small pericardial effusion.  Intervention: DuoNeb X1    Cardiac Studies:  -No previous history of any cardiac imaging or studies performed.     Past Medical History  She has no past medical history on file.    Surgical History  She has no past surgical history on file.     Social History  She reports that she has never smoked. She has never been exposed to tobacco smoke. She has never used smokeless tobacco. She reports that she does not drink alcohol and does not use drugs.    Family History  No family history on file.     Allergies  Patient has no known allergies.    Review of Systems  Patient denies all symptomatology pertaining to 12 point ROS with exception of those listed above HPI.     Physical Exam  General: Not in acute distress, A&O x 3, alert, cooperative, obese  HEENT: Normocephalic, atraumatic, EOMI, moist mucous membranes  Neck: Neck supple, trachea midline, no evidence of trauma  Cardiovascular: RRR, S1 and S2 appreciated, no murmurs rubs gallops appreciated, distal pulses 2+ bilaterally radial 1+ dorsalis pedis  Respiratory: Vesicular breath sounds appreciated bilaterally, no adventitious sounds appreciated, no increased work of breathing on 2L NC  GI: Abdomen tense with anasarca , protuberant, nondistended, nontender to palpation, bowel  sounds present  Extremities: 3+ edema appreciated in lower extremities bilaterally to the level of hip, no cyanosis  Neuro: A&O X3, no focal deficits, strength and sensation intact bilaterally  Skin: Warm and dry, without lesions or rashes       Last Recorded Vitals  Blood pressure (!) 162/120, pulse (!) 115, temperature 36.7 °C (98.1 °F), temperature source Temporal, resp. rate 20, SpO2 97%.    Relevant Results  Electrocardiogram, 12-lead PRN ACS symptoms    Result Date: 1/16/2025  Sinus tachycardia Possible Left atrial enlargement Right axis deviation Low voltage QRS Septal infarct , age undetermined Abnormal ECG When compared with ECG of 15-SYDNI-2025 13:51, ST now depressed in Anterolateral leads Inverted T waves have replaced nonspecific T wave abnormality in Inferior leads    ECG 12 lead    Result Date: 1/16/2025  Sinus tachycardia Possible Left atrial enlargement Right axis deviation Low voltage QRS Nonspecific T wave abnormality Abnormal ECG No previous ECGs available    Transthoracic Echo (TTE) Complete    Result Date: 1/15/2025   [facility], [address] [place] [address]              Tel [phone] and Fax [phone] TRANSTHORACIC ECHOCARDIOGRAM REPORT  Patient Name:       DANIELA Gilbert Physician:    [id number] Nupur Lucas MD Study Date:         1/15/2025           Ordering Provider:    [id number] SCHMIDT MRN/PID:            [id number]            Fellow: Accession#:         [id number]        Nurse:                Little Landrum RN Date of Birth/Age:  1975 / 49      Sonographer:          Angela Peña RDCS                     years Gender assigned at  F                   Additional Staff: Birth: Height:             149.86 cm           Admit Date: Weight:             104.33 kg           Admission Status:     Emergency BSA / BMI:          1.96 m2 / 46.45     Encounter#:           [id number]                      kg/m2 Blood Pressure:     160/124 mmHg        Department Location:  [facility]                                                               Department Study Type:    TRANSTHORACIC ECHO (TTE) COMPLETE Diagnosis/ICD: Other pericardial effusion (noninflammatory)-I31.39 Indication:    Cardiomegaly CPT Code:      Echo Complete w Full Doppler-[id number] Patient History: Pertinent History: CHF. Effusion. Study Detail: The following Echo studies were performed: 2D, M-Mode, Doppler and               color flow. Technically challenging study due to body habitus and               poor acoustic windows. Definity used as a contrast agent for               endocardial border definition. Total contrast used for this               procedure was 3.5 mL via IV push. The patient was awake.  PHYSICIAN INTERPRETATION: Left Ventricle: Left ventricular ejection fraction is moderately decreased, by visual estimate at 35-40%. There are no regional left ventricular wall motion abnormalities. The left ventricular cavity size is normal. There is left ventricular concentric remodeling. Spectral Doppler shows an abnormal pattern of left ventricular diastolic filling. Left Atrium: The left atrium is moderately dilated. Right Ventricle: The right ventricle is normal in size. There is normal right ventricular global systolic function. Right Atrium: The right atrium is normal in size. Aortic Valve: The aortic valve was not well visualized. There is no evidence of aortic valve regurgitation. The peak instantaneous gradient of the aortic valve is 7 mmHg. Mitral Valve: The mitral valve is normal in structure. There is mild mitral valve regurgitation. Tricuspid Valve: The tricuspid valve is structurally normal. There is mild tricuspid regurgitation. Pulmonic Valve: The pulmonic valve is not well visualized. There is physiologic pulmonic valve regurgitation. Pericardium: Small pericardial effusion. Aorta: The aortic root is normal.  CONCLUSIONS:   1. Poorly visualized anatomical structures due to suboptimal image quality.  2. Left ventricular ejection fraction is moderately decreased, by visual estimate at 35-40%.  3. Spectral Doppler shows an abnormal pattern of left ventricular diastolic filling.  4. There is normal right ventricular global systolic function.  5. The left atrium is moderately dilated.  6. There is a small pericardial effusion. QUANTITATIVE DATA SUMMARY:  2D MEASUREMENTS:          Normal Ranges: Ao Root d:       2.30 cm  (2.0-3.7cm) LAs:             3.50 cm  (2.7-4.0cm) IVSd:            1.14 cm  (0.6-1.1cm) LVPWd:           1.36 cm  (0.6-1.1cm) LVIDd:           4.13 cm  (3.9-5.9cm) LVIDs:           3.45 cm LV Mass Index:   94 g/m2 LVEDV Index:     72 ml/m2 LV % FS          16.5 %  LA VOLUME:                    Normal Ranges: LA Vol A4C:        67.4 ml    (22+/-6mL/m2) LA Vol A2C:        70.9 ml LA Vol BP:         70.9 ml LA Vol Index A4C:  34.5ml/m2 LA Vol Index A2C:  36.2 ml/m2 LA Vol Index BP:   36.2 ml/m2 LA Area A4C:       22.0 cm2 LA Area A2C:       22.0 cm2 LA Major Axis A4C: 6.1 cm LA Major Axis A2C: 5.8 cm LA Volume Index:   35.0 ml/m2  RA VOLUME BY A/L METHOD:            Normal Ranges: RA Vol A4C:              54.0 ml    (8.3-19.5ml) RA Vol Index A4C:        27.6 ml/m2 RA Area A4C:             18.0 cm2 RA Major Axis A4C:       5.1 cm  M-MODE MEASUREMENTS:         Normal Ranges: Ao Root:             3.00 cm (2.0-3.7cm) LAs:                 4.30 cm (2.7-4.0cm)  AORTA MEASUREMENTS:         Normal Ranges: Asc Ao, d:          3.30 cm (2.1-3.4cm)  LV SYSTOLIC FUNCTION BY 2D PLANIMETRY (MOD):                      Normal Ranges: EF-A4C View:    36 % (>=55%) EF-A2C View:    39 % EF-Biplane:     37 % EF-Visual:      38 % LV EF Reported: 38 %  LV DIASTOLIC FUNCTION:           Normal Ranges: MV Peak E:             1.46 m/s  (0.7-1.2 m/s) MV e'                  0.090 m/s (>8.0) MV lateral e'          0.10 m/s MV medial e'           0.08 m/s E/e'  Ratio:            16.17     (<8.0)  MITRAL VALVE:          Normal Ranges: MV DT:        135 msec (150-240msec)  AORTIC VALVE:           Normal Ranges: AoV Vmax:      1.29 m/s (<=1.7m/s) AoV Peak P.7 mmHg (<20mmHg) LVOT Max Alvin:  0.69 m/s (<=1.1m/s) LVOT VTI:      9.16 cm LVOT Diameter: 2.00 cm  (1.8-2.4cm) AoV Area,Vmax: 1.68 cm2 (2.5-4.5cm2)  RIGHT VENTRICLE: RV Basal 4.30 cm RV Mid   2.90 cm RV Major 7.6 cm TAPSE:   19.7 mm RV s'    0.09 m/s  TRICUSPID VALVE/RVSP:         Normal Ranges: IVC Diam:             2.80 cm  PULMONIC VALVE:          Normal Ranges: PV Max Alvin:     0.7 m/s  (0.6-0.9m/s) PV Max P.1 mmHg  [id number] Nupur Lucas MD Electronically signed on 1/15/2025 at 11:06:13 PM  ** Final **     CT angio chest for pulmonary embolism    Result Date: 1/15/2025  Interpreted By:  Elpidio Washington, STUDY: CT ANGIO CHEST FOR PULMONARY EMBOLISM;  1/15/2025 3:10 pm   INDICATION: Signs/Symptoms:sob.   COMPARISON: None.   ACCESSION NUMBER(S): [id number]   ORDERING CLINICIAN: NADINE SCHMIDT   TECHNIQUE: Helical data acquisition of the chest was obtained with  75 mL Omnipaque 350. Images were reformatted in axial, coronal, and sagittal planes.MIP reformatted images were also generated.   FINDINGS: LUNGS and AIRWAYS: Aside from a few areas of minor atelectasis, lungs appear clear. 3 mm nodule in the right lower lobe superior segment. No pleural effusion or pneumothorax.   Central airways are patent. No bronchiectasis.   MEDIASTINUM and MILDRED, LOWER NECK AND AXILLA: The visualized thyroid gland is grossly unremarkable.   11 mm subcarinal node perhaps reactive.   Esophagus is not dilated.   HEART and VESSELS: Severe cardiomegaly.   Large pericardial effusion.   No pulmonary embolism identified.   Poor opacification of the systemic arteries which limits evaluation. Thoracic aorta is grossly patent without aneurysm.   UPPER ABDOMEN: Partially imaged upper abdominal ascites and anasarca. Reflux of contrast into  the distended hepatic veins.   CHEST WALL and OSSEOUS STRUCTURES: No suspicious osseous lesions. Multilevel degenerative changes of the thoracic spine.         1.  No pulmonary embolism identified. 2. Severe cardiomegaly with large pericardial effusion. Reflux of contrast into the distended hepatic veins suggests elevated right heart pressures. 3. 3 mm right lower lobe nodule. Mildly enlarged subcarinal lymph nodes perhaps reactive. Consider follow-up CT chest in 1 year to document stability. 4. Partially imaged upper abdominal ascites and anasarca.     Signed by: Elpidio Washington 1/15/2025 3:33 PM Dictation workstation:   [id number]   Results for orders placed or performed during the hospital encounter of 01/16/25 (from the past 24 hours)   Hemoglobin A1c   Result Value Ref Range    Hemoglobin A1C 8.2 (H) See comment %    Estimated Average Glucose 189 Not Established mg/dL   TSH with reflex to Free T4 if abnormal   Result Value Ref Range    Thyroid Stimulating Hormone 1.64 0.44 - 3.98 mIU/L   Lipid panel   Result Value Ref Range    Cholesterol 155 0 - 199 mg/dL    HDL-Cholesterol 26.8 mg/dL    Cholesterol/HDL Ratio 5.8     LDL Calculated 92 <=99 mg/dL    VLDL 37 0 - 40 mg/dL    Triglycerides 183 (H) 0 - 149 mg/dL    Non HDL Cholesterol 128 0 - 149 mg/dL   hCG, quantitative, pregnancy   Result Value Ref Range    HCG, Beta-Quantitative <2 <5 mIU/mL   POCT GLUCOSE   Result Value Ref Range    POCT Glucose 196 (H) 74 - 99 mg/dL   Electrocardiogram, 12-lead PRN ACS symptoms   Result Value Ref Range    Ventricular Rate 122 BPM    Atrial Rate 122 BPM    GA Interval 124 ms    QRS Duration 76 ms    QT Interval 404 ms    QTC Calculation(Bazett) 575 ms    P Axis 67 degrees    R Axis 123 degrees    T Axis -2 degrees    QRS Count 20 beats    Q Onset 227 ms    P Onset 165 ms    P Offset 206 ms    T Offset 429 ms    QTC Fredericia 511 ms   CBC and Auto Differential   Result Value Ref Range    WBC 15.9 (H) 4.4 - 11.3 x10*3/uL    nRBC  0.0 0.0 - 0.0 /100 WBCs    RBC 6.28 (H) 4.00 - 5.20 x10*6/uL    Hemoglobin 17.0 (H) 12.0 - 16.0 g/dL    Hematocrit 59.7 (H) 36.0 - 46.0 %    MCV 95 80 - 100 fL    MCH 27.1 26.0 - 34.0 pg    MCHC 28.5 (L) 32.0 - 36.0 g/dL    RDW 13.9 11.5 - 14.5 %    Platelets 326 150 - 450 x10*3/uL    Neutrophils % 65.7 40.0 - 80.0 %    Immature Granulocytes %, Automated 2.1 (H) 0.0 - 0.9 %    Lymphocytes % 21.9 13.0 - 44.0 %    Monocytes % 8.6 2.0 - 10.0 %    Eosinophils % 1.2 0.0 - 6.0 %    Basophils % 0.5 0.0 - 2.0 %    Neutrophils Absolute 10.46 (H) 1.20 - 7.70 x10*3/uL    Immature Granulocytes Absolute, Automated 0.33 0.00 - 0.70 x10*3/uL    Lymphocytes Absolute 3.48 1.20 - 4.80 x10*3/uL    Monocytes Absolute 1.37 (H) 0.10 - 1.00 x10*3/uL    Eosinophils Absolute 0.19 0.00 - 0.70 x10*3/uL    Basophils Absolute 0.08 0.00 - 0.10 x10*3/uL   Renal Function Panel   Result Value Ref Range    Glucose 153 (H) 74 - 99 mg/dL    Sodium 143 136 - 145 mmol/L    Potassium 4.5 3.5 - 5.3 mmol/L    Chloride 99 98 - 107 mmol/L    Bicarbonate 35 (H) 21 - 32 mmol/L    Anion Gap 14 10 - 20 mmol/L    Urea Nitrogen 20 6 - 23 mg/dL    Creatinine 1.19 (H) 0.50 - 1.05 mg/dL    eGFR 56 (L) >60 mL/min/1.73m*2    Calcium 9.2 8.6 - 10.6 mg/dL    Phosphorus 4.8 2.5 - 4.9 mg/dL    Albumin 4.0 3.4 - 5.0 g/dL   Magnesium   Result Value Ref Range    Magnesium 2.57 (H) 1.60 - 2.40 mg/dL   Urinalysis with Reflex Microscopic   Result Value Ref Range    Color, Urine Light-Yellow Light-Yellow, Yellow, Dark-Yellow    Appearance, Urine Clear Clear    Specific Gravity, Urine 1.017 1.005 - 1.035    pH, Urine 6.0 5.0, 5.5, 6.0, 6.5, 7.0, 7.5, 8.0    Protein, Urine 10 (TRACE) NEGATIVE, 10 (TRACE), 20 (TRACE) mg/dL    Glucose, Urine Normal Normal mg/dL    Blood, Urine NEGATIVE NEGATIVE    Ketones, Urine NEGATIVE NEGATIVE mg/dL    Bilirubin, Urine NEGATIVE NEGATIVE    Urobilinogen, Urine Normal Normal mg/dL    Nitrite, Urine NEGATIVE NEGATIVE    Leukocyte Esterase, Urine  NEGATIVE NEGATIVE   Drug Screen, Urine   Result Value Ref Range    Amphetamine Screen, Urine Presumptive Negative Presumptive Negative    Barbiturate Screen, Urine Presumptive Negative Presumptive Negative    Benzodiazepines Screen, Urine Presumptive Negative Presumptive Negative    Cannabinoid Screen, Urine Presumptive Negative Presumptive Negative    Cocaine Metabolite Screen, Urine Presumptive Negative Presumptive Negative    Fentanyl Screen, Urine Presumptive Negative Presumptive Negative    Opiate Screen, Urine Presumptive Negative Presumptive Negative    Oxycodone Screen, Urine Presumptive Negative Presumptive Negative    PCP Screen, Urine Presumptive Negative Presumptive Negative    Methadone Screen, Urine Presumptive Negative Presumptive Negative   Microscopic Only, Urine   Result Value Ref Range    WBC, Urine NONE 1-5, NONE /HPF    RBC, Urine NONE NONE, 1-2, 3-5 /HPF    Squamous Epithelial Cells, Urine 1-9 (SPARSE) Reference range not established. /HPF    Mucus, Urine FEW Reference range not established. /LPF   Urine electrolytes   Result Value Ref Range    Sodium, Urine Random 117 mmol/L    Sodium/Creatinine Ratio 195 Not established. mmol/g Creat    Potassium, Urine Random 23 mmol/L    Potassium/Creatinine Ratio 38 Not established mmol/g Creat    Chloride, Urine Random 137 mmol/L    Chloride/Creatinine Ratio 229 38 - 318 mmol/g creat    Creatinine, Urine Random 59.9 20.0 - 320.0 mg/dL   Urea Nitrogen, Urine Random   Result Value Ref Range    Urea Nitrogen, Urine Random 407 mg/dL    Creatinine, Urine Random 59.9 20.0 - 320.0 mg/dL    Urea Nitrogen/Creatinine Ratio 6.8 Not established. g/g creat   Sterile Cup   Result Value Ref Range    Extra Tube Hold for add-ons.      Scheduled medications  DULoxetine, 60 mg, oral, Daily  enoxaparin, 40 mg, subcutaneous, q12h BALA  predniSONE, 15 mg, oral, Daily  sacubitriL-valsartan, 0.5 tablet, oral, BID      Continuous medications     PRN medications  PRN medications:  acetaminophen, albuterol       Assessment/Plan   Patient is a 49-year-old female with PMH significant for HTN, HLD, obesity, asthma, depression.  Patient presented as a transfer from [facility] where she presented with acute on chronic dyspnea worsening over the last few months which she initially attributed to poorly controlled asthma.  Treated with several courses of glucocorticoids with no improvement and had recently been taking prescribed by urgent care earlier this week.  She was found to be in acute decompensated heart failure with new HFrEF based on TTE findings here at [facility].  Patient was admitted to internal medicine service for further evaluation management of ADHF, and cardiology was consulted for this.    #New HFrEF (TTE 1/15/2025: EF 35-40%) NYHA ClassIII  #ADHF  #Type 2 MI demand mismatch   #Malignant HTN (Uncontrolled)  #HLD  #Obesity  #New dx of DM   #Moderate pericardial effusion     Plan:  -has already had stratification measures including hemoglobin A1c 8.2 which is a new diagnosis for diabetes mellitus and lipid panel which shows cholesterol is WNL with exception to elevated triglycerides   --> Will need to begin statin with new ddx of HFrEF and DM   -Malignant HTN possibly 2/2 chronic glucocorticoid use  -As patient has new onset HFrEF she warrants an ischemic workup with diagnostic cardiac catheterization once euvolemic.  -S/p 40 mg of Lasix IV per the primary team recommend continued diuresis with Lasix 40 mg twice daily as kidney function tolerates  -Agree with initiating GDMT slowly primary team is already started Entresto also recommend a beta-blocker, SGLT-2, and spironolactone moving forward.  -Obtain better glycemic control with SSI as indicated while inpatient and establish oral regimen prior to DC   -anticipate resolution of pericardial effusion with diuresis  -Patient is appropriate for transfer to the cardiac service in the setting of new onset HFrEF with likely cath in the future      #Leukocytosis 2/2 glucocorticoids  -Continue to monitor    Case dicussed with cardiology fellow Dr. Bullock, and to be staffed with Dr. Luis.     Thank you for involving us in this patient care. Recommendations not final until signed by attending.     General Cardiology Consult Pager: [phone] (weekday 7AM-6PM and weekend 7AM-2PM) and other: [phone]  EP Consult Pager: [phone] (weekday 7AM-6PM and weekend 7AM-2PM) and other: [phone]  CICU Fellow Pager: [phone] anytime  EP Device Nurse Pager: [phone] (weekday 7AM-4PM)  Advanced Heart Failure Consult Pager: [phone] anytime    Otto Mitchell MD  Internal Medicine PGY-2

## 2025-01-16 NOTE — H&P
HPI:  Samina Park is a 49 y.o. female with history of HTN, asthma, and depression who presents to [facility] as a transfer from [facility] ED where she presented with acute on chronic dyspnea. Patient states that over the past few months she has had progressive dyspnea and wheeze that she was attributing to poorly controlled asthma. Has been treated with several courses of steroids with no improvement and was most recently on a course of prednisone prescribed by urgent care earlier this week. Presented to the ED today because she was so dyspneic that she was barely able to ambulate around her house, and was concerned by her level of debility and inability to work over the past few months because of these symptoms. Additionally endorses orthopnea, PND, wheezing not relieved by albuterol, cough productive of clear sputum, severe LE edema, and weight gain. Cannot recall any events associated with the onset of her symptoms. Denies any current or past episodes of chest pain. Denies any history of cardiac problems or prior workup. No new medications or diet changes. No fevers, chills, recent illnesses, headache, abdominal pain, or changes in bowel/bladder function.    ED course:  - Vitals on presentation: afebrile, , /113, SpO2 94% on room air, RR 20  - Labs:   CBC: WBC 18.9 Hgb 15.2  plt 351   BMP: Na 141, K 4.5 Cl 100 HCO3 35 BUN 20 Cr 1.10glu 253   LFT: Ca 8.7 tprot 6.2, alb 3.8 alkphos 81 AST20 ALT 30 tbili 0.8 dbili _   Electrolytes: PO4 _ Mg 2.21   Troponin 43--> 44  - EKG:  Sinus tachycardia with a ventricular rate of 123, right axis deviation, otherwise normal intervals with no acute ischemic changes  - Imaging:    CTPE:   1.  No pulmonary embolism identified.  2. Severe cardiomegaly with large pericardial effusion. Reflux of  contrast into the distended hepatic veins suggests elevated right  heart pressures.  3. 3 mm right lower lobe nodule. Mildly enlarged subcarinal lymph  nodes perhaps  reactive. Consider follow-up CT chest in 1 year to  document stability.  4. Partially imaged upper abdominal ascites and anasarca.  - Echo:   1. Poorly visualized anatomical structures due to suboptimal image quality.   2. Left ventricular ejection fraction is moderately decreased, by visual estimate at 35-40%.   3. Spectral Doppler shows an abnormal pattern of left ventricular diastolic filling.   4. There is normal right ventricular global systolic function.   5. The left atrium is moderately dilated.   6. There is a small pericardial effusion.  - Interventions:   - duoneb x1    Medications prior to admission:  Cymbalta 60 mg daily    Allergies:  No Known Allergies    Past medical history:  As per HPI    Surgical history:  As per HPI    Family history:  Patient unaware of any family history of MI, heart failure, or SCD though she will check with her relatives    Social history:  Never smoker, no EtOH, no recreational drug use    Review of systems:  Constitutional: negative for fevers, chills, weight loss. Pos for weight gain, fatigue  HEENT: negative for headache, changes in vision or hearing, congestion, sore throat.  Respiratory: positive for dyspnea, wheeze  Cardiovascular: negative for chest pain, palpitations. Positive orthopnea, PND  GI: negative for dysphagia, abdominal pain, nausea, vomiting, diarrhea, constipation, melena, hematochezia, BRBPR  : negative for frequency, urgency, dysuria, hematuria, incontinence  MSK: Pos for LE edema, generalized edema. negative for myalgia, arthralgia, decreased joint ROM  Skin: negative for rash, wounds  Heme/lymph: negative for easy bruising, bleeding, epistaxis  Neuro: negative for LOC, numbness, tingling, tremor, vertigo, dizziness    Vitals:  Vitals:    01/16/25 0039   BP: (!) 150/95   Pulse: (!) 116   Resp: 15   Temp: 36.4 °C (97.5 °F)   SpO2: 97%       Physical exam:  Constitutional: Well-developed female in no acute distress. Sitting up in bed on 2L NC.  HEENT:  Normocephalic, atraumatic. PERRL. EOMI. No cervical lymphadenopathy.  Respiratory: CTA bilaterally. No wheezes, rales, or rhonchi. Normal respiratory effort.  Cardiovascular: Tachycardic rate, regular rhythm. Distant heart sounds. No murmurs, gallops, or rubs. Unable to assess JVD d/t habitus  Abdominal: Soft, moderately distended, nontender to palpation. Bowel sounds present.   Neuro: Alert and oriented x3, moving extremities equally.  MSK: Severe pitting edema up to hips  Skin: Warm, dry. No rashes or wounds.  Psych: Appropriate mood and affect.    Labs:  Results for orders placed or performed during the hospital encounter of 01/15/25 (from the past 24 hours)   CBC and Auto Differential   Result Value Ref Range    WBC 18.9 (H) 4.4 - 11.3 x10*3/uL    nRBC 0.0 0.0 - 0.0 /100 WBCs    RBC 5.65 (H) 4.00 - 5.20 x10*6/uL    Hemoglobin 15.2 12.0 - 16.0 g/dL    Hematocrit 51.4 (H) 36.0 - 46.0 %    MCV 91 80 - 100 fL    MCH 26.9 26.0 - 34.0 pg    MCHC 29.6 (L) 32.0 - 36.0 g/dL    RDW 13.9 11.5 - 14.5 %    Platelets 351 150 - 450 x10*3/uL    Neutrophils % 78.4 40.0 - 80.0 %    Immature Granulocytes %, Automated 0.6 0.0 - 0.9 %    Lymphocytes % 13.3 13.0 - 44.0 %    Monocytes % 7.2 2.0 - 10.0 %    Eosinophils % 0.3 0.0 - 6.0 %    Basophils % 0.2 0.0 - 2.0 %    Neutrophils Absolute 14.80 (H) 1.20 - 7.70 x10*3/uL    Immature Granulocytes Absolute, Automated 0.12 0.00 - 0.70 x10*3/uL    Lymphocytes Absolute 2.51 1.20 - 4.80 x10*3/uL    Monocytes Absolute 1.37 (H) 0.10 - 1.00 x10*3/uL    Eosinophils Absolute 0.06 0.00 - 0.70 x10*3/uL    Basophils Absolute 0.04 0.00 - 0.10 x10*3/uL   Comprehensive Metabolic Panel   Result Value Ref Range    Glucose 253 (H) 74 - 99 mg/dL    Sodium 141 136 - 145 mmol/L    Potassium 4.5 3.5 - 5.3 mmol/L    Chloride 100 98 - 107 mmol/L    Bicarbonate 35 (H) 21 - 32 mmol/L    Anion Gap 11 10 - 20 mmol/L    Urea Nitrogen 20 6 - 23 mg/dL    Creatinine 1.10 (H) 0.50 - 1.05 mg/dL    eGFR 62 >60  mL/min/1.73m*2    Calcium 8.7 8.6 - 10.3 mg/dL    Albumin 3.8 3.4 - 5.0 g/dL    Alkaline Phosphatase 81 33 - 110 U/L    Total Protein 6.2 (L) 6.4 - 8.2 g/dL    AST 20 9 - 39 U/L    Bilirubin, Total 0.8 0.0 - 1.2 mg/dL    ALT 30 7 - 45 U/L   Magnesium   Result Value Ref Range    Magnesium 2.21 1.60 - 2.40 mg/dL   Troponin I, High Sensitivity   Result Value Ref Range    Troponin I, High Sensitivity 43 (H) 0 - 13 ng/L   B-type natriuretic peptide   Result Value Ref Range     (H) 0 - 99 pg/mL   Sars-CoV-2 and Influenza A/B PCR   Result Value Ref Range    Flu A Result Not Detected Not Detected    Flu B Result Not Detected Not Detected    Coronavirus 2019, PCR Not Detected Not Detected   RSV PCR   Result Value Ref Range    RSV PCR Not Detected Not Detected   Troponin I, High Sensitivity   Result Value Ref Range    Troponin I, High Sensitivity 44 (H) 0 - 13 ng/L   Transthoracic Echo (TTE) Complete   Result Value Ref Range    AV pk nakul 1.29 m/s    LVOT diam 2.00 cm    LA vol index A/L 36.2 ml/m2    Tricuspid annular plane systolic excursion 2.0 cm    LV EF 38 %    RV free wall pk S' 9.36 cm/s    LVIDd 4.13 cm    Aortic Valve Area by Continuity of Peak Velocity 1.68 cm2    AV pk grad 7 mmHg    LV A4C EF 36.0        Imaging:  Transthoracic Echo (TTE) Complete    Result Date: 1/15/2025   [facility], [address] [place] [address]              Tel [phone] and Fax [phone] TRANSTHORACIC ECHOCARDIOGRAM REPORT  Patient Name:       DANIELA Gilbert Physician:    [id number] Nupur Lucas MD Study Date:         1/15/2025           Ordering Provider:    [id number] SCHMIDT MRN/PID:            [id number]            Fellow: Accession#:         [id number]        Nurse:                Little Landrum RN Date of Birth/Age:  1975 / 49      Sonographer:          Angela Peña RDCS                     years Gender assigned at  F                    Additional Staff: Birth: Height:             149.86 cm           Admit Date: Weight:             104.33 kg           Admission Status:     Emergency BSA / BMI:          1.96 m2 / 46.45     Encounter#:           [id number]                     kg/m2 Blood Pressure:     160/124 mmHg        Department Location:  [facility]                                                               Department Study Type:    TRANSTHORACIC ECHO (TTE) COMPLETE Diagnosis/ICD: Other pericardial effusion (noninflammatory)-I31.39 Indication:    Cardiomegaly CPT Code:      Echo Complete w Full Doppler-33075 Patient History: Pertinent History: CHF. Effusion. Study Detail: The following Echo studies were performed: 2D, M-Mode, Doppler and               color flow. Technically challenging study due to body habitus and               poor acoustic windows. Definity used as a contrast agent for               endocardial border definition. Total contrast used for this               procedure was 3.5 mL via IV push. The patient was awake.  PHYSICIAN INTERPRETATION: Left Ventricle: Left ventricular ejection fraction is moderately decreased, by visual estimate at 35-40%. There are no regional left ventricular wall motion abnormalities. The left ventricular cavity size is normal. There is left ventricular concentric remodeling. Spectral Doppler shows an abnormal pattern of left ventricular diastolic filling. Left Atrium: The left atrium is moderately dilated. Right Ventricle: The right ventricle is normal in size. There is normal right ventricular global systolic function. Right Atrium: The right atrium is normal in size. Aortic Valve: The aortic valve was not well visualized. There is no evidence of aortic valve regurgitation. The peak instantaneous gradient of the aortic valve is 7 mmHg. Mitral Valve: The mitral valve is normal in structure. There is mild mitral valve regurgitation. Tricuspid Valve: The tricuspid valve is  structurally normal. There is mild tricuspid regurgitation. Pulmonic Valve: The pulmonic valve is not well visualized. There is physiologic pulmonic valve regurgitation. Pericardium: Small pericardial effusion. Aorta: The aortic root is normal.  CONCLUSIONS:  1. Poorly visualized anatomical structures due to suboptimal image quality.  2. Left ventricular ejection fraction is moderately decreased, by visual estimate at 35-40%.  3. Spectral Doppler shows an abnormal pattern of left ventricular diastolic filling.  4. There is normal right ventricular global systolic function.  5. The left atrium is moderately dilated.  6. There is a small pericardial effusion. QUANTITATIVE DATA SUMMARY:  2D MEASUREMENTS:          Normal Ranges: Ao Root d:       2.30 cm  (2.0-3.7cm) LAs:             3.50 cm  (2.7-4.0cm) IVSd:            1.14 cm  (0.6-1.1cm) LVPWd:           1.36 cm  (0.6-1.1cm) LVIDd:           4.13 cm  (3.9-5.9cm) LVIDs:           3.45 cm LV Mass Index:   94 g/m2 LVEDV Index:     72 ml/m2 LV % FS          16.5 %  LA VOLUME:                    Normal Ranges: LA Vol A4C:        67.4 ml    (22+/-6mL/m2) LA Vol A2C:        70.9 ml LA Vol BP:         70.9 ml LA Vol Index A4C:  34.5ml/m2 LA Vol Index A2C:  36.2 ml/m2 LA Vol Index BP:   36.2 ml/m2 LA Area A4C:       22.0 cm2 LA Area A2C:       22.0 cm2 LA Major Axis A4C: 6.1 cm LA Major Axis A2C: 5.8 cm LA Volume Index:   35.0 ml/m2  RA VOLUME BY A/L METHOD:            Normal Ranges: RA Vol A4C:              54.0 ml    (8.3-19.5ml) RA Vol Index A4C:        27.6 ml/m2 RA Area A4C:             18.0 cm2 RA Major Axis A4C:       5.1 cm  M-MODE MEASUREMENTS:         Normal Ranges: Ao Root:             3.00 cm (2.0-3.7cm) LAs:                 4.30 cm (2.7-4.0cm)  AORTA MEASUREMENTS:         Normal Ranges: Asc Ao, d:          3.30 cm (2.1-3.4cm)  LV SYSTOLIC FUNCTION BY 2D PLANIMETRY (MOD):                      Normal Ranges: EF-A4C View:    36 % (>=55%) EF-A2C View:    39 %  EF-Biplane:     37 % EF-Visual:      38 % LV EF Reported: 38 %  LV DIASTOLIC FUNCTION:           Normal Ranges: MV Peak E:             1.46 m/s  (0.7-1.2 m/s) MV e'                  0.090 m/s (>8.0) MV lateral e'          0.10 m/s MV medial e'           0.08 m/s E/e' Ratio:            16.17     (<8.0)  MITRAL VALVE:          Normal Ranges: MV DT:        135 msec (150-240msec)  AORTIC VALVE:           Normal Ranges: AoV Vmax:      1.29 m/s (<=1.7m/s) AoV Peak P.7 mmHg (<20mmHg) LVOT Max Alvin:  0.69 m/s (<=1.1m/s) LVOT VTI:      9.16 cm LVOT Diameter: 2.00 cm  (1.8-2.4cm) AoV Area,Vmax: 1.68 cm2 (2.5-4.5cm2)  RIGHT VENTRICLE: RV Basal 4.30 cm RV Mid   2.90 cm RV Major 7.6 cm TAPSE:   19.7 mm RV s'    0.09 m/s  TRICUSPID VALVE/RVSP:         Normal Ranges: IVC Diam:             2.80 cm  PULMONIC VALVE:          Normal Ranges: PV Max Alvin:     0.7 m/s  (0.6-0.9m/s) PV Max P.1 mmHg  [id number] Nupur Lucas MD Electronically signed on 1/15/2025 at 11:06:13 PM  ** Final **     CT angio chest for pulmonary embolism    Result Date: 1/15/2025  Interpreted By:  Elpidio Washington, STUDY: CT ANGIO CHEST FOR PULMONARY EMBOLISM;  1/15/2025 3:10 pm   INDICATION: Signs/Symptoms:sob.   COMPARISON: None.   ACCESSION NUMBER(S): [id number]   ORDERING CLINICIAN: NADINE SCHMIDT   TECHNIQUE: Helical data acquisition of the chest was obtained with  75 mL Omnipaque 350. Images were reformatted in axial, coronal, and sagittal planes.MIP reformatted images were also generated.   FINDINGS: LUNGS and AIRWAYS: Aside from a few areas of minor atelectasis, lungs appear clear. 3 mm nodule in the right lower lobe superior segment. No pleural effusion or pneumothorax.   Central airways are patent. No bronchiectasis.   MEDIASTINUM and MILDRED, LOWER NECK AND AXILLA: The visualized thyroid gland is grossly unremarkable.   11 mm subcarinal node perhaps reactive.   Esophagus is not dilated.   HEART and VESSELS: Severe cardiomegaly.   Large  pericardial effusion.   No pulmonary embolism identified.   Poor opacification of the systemic arteries which limits evaluation. Thoracic aorta is grossly patent without aneurysm.   UPPER ABDOMEN: Partially imaged upper abdominal ascites and anasarca. Reflux of contrast into the distended hepatic veins.   CHEST WALL and OSSEOUS STRUCTURES: No suspicious osseous lesions. Multilevel degenerative changes of the thoracic spine.         1.  No pulmonary embolism identified. 2. Severe cardiomegaly with large pericardial effusion. Reflux of contrast into the distended hepatic veins suggests elevated right heart pressures. 3. 3 mm right lower lobe nodule. Mildly enlarged subcarinal lymph nodes perhaps reactive. Consider follow-up CT chest in 1 year to document stability. 4. Partially imaged upper abdominal ascites and anasarca.     Signed by: Elpidio Washington 1/15/2025 3:33 PM Dictation workstation:   [id number]       Assessment and Plan:  Samina Park is a 49 y.o. female with past medical history of HTN, asthma, and depression who presents with new acute decompensated heart failure with reduced EF. Potential etiologies for new HF includes ischemic CM, hypertensive heart disease, tachycardia-mediated cardiomyopathy, hypo/hyperthyroid associated CM, or restrictive CM. On CTPE there was contrast reflux into the hepatic veins c/f elevated R heart pressures, though RV appeared normal on TTE. May need RHC for eval of R-sided function, and if reduced additional etiologies beyond left heart failure to be considered include DARELL or pHTN. Unclear why she is persistently tachycardic, may be secondary to heart failure or frequent albuterol use, as she is not hypovolemic or having infectious symptoms.    #New acute decompensated heart failure with reduced EF, NYHA Class III  #C/f pericardial effusion  #Sinus tachycardia  #Anasarca  #HTN  - CTPE c/f large pericardial effusion though none present on TTE  - TTE 1/15/25:    1. Poorly visualized  anatomical structures due to suboptimal image quality.   2. Left ventricular ejection fraction is moderately decreased, by visual estimate at 35-40%.   3. Spectral Doppler shows an abnormal pattern of left ventricular diastolic filling.   4. There is normal right ventricular global systolic function.   5. The left atrium is moderately dilated.   6. There is a small pericardial effusion.  -  on presentation  - TSH wnl  - lipids: Tchol 155, HDL 27, LDL 92, Trig 183  Plan:  - fu HbA1c, UDS, UA to r/o proteinuria  - start Entresto 12/13 mg BID in AM, other GDMT to follow  - IV Lasix 40 mg x1, continue further diuresis  - will need ischemic evaluation and potentially RHC, can consult cardiology in AM  - consider repeating TTE d/t poor image quality prior    #Leukocytosis  - likely 2/2 outpatient steroid course for misdiagnosis of asthma exacerbation, no infectious s/s  Plan:  - stop steroids  - continue to trend WBC, monitor for infectious sx    #Asthma  - home meds: albuterol inhaler and nebs PRN  Plan:  - fu VBG  - may need outpt sleep study    #Elevated bicarb  - suspect 2/2 OHS vs DARELL  Plan:  - fu VBG  - may need outpt sleep study    #Depression   Plan:  - continue home Cymbalta 60 mg daily    Diet: cardiac  DVT prophylaxis: Lovenox  Code status: Full code confirmed on admission  NOK: HARMEET Jenkins  [phone]     Patient and plan to be formally discussed with attending physician in the AM.    Valencia Delgado MD  Internal Medicine PGY-2

## 2025-01-16 NOTE — PROGRESS NOTES
Internal Medicine Progress Note     Samina Park is a 49 y.o. female with a past medical history of uncontrolled HTN and asthma admitted on 1/16/2025 with acute decompensated heart failure iso newly diagnosed HFrEF.     Subjective     No acute events overnight and no new concerns this morning. Not having dyspnea at rest, on 2L nc. ROS -ve unless stated otherwise.    Medications     Medications:   Scheduled medications  carvedilol, 6.25 mg, oral, BID  DULoxetine, 60 mg, oral, Daily  enoxaparin, 40 mg, subcutaneous, q12h BALA  insulin lispro, 0-5 Units, subcutaneous, TID AC  [START ON 1/17/2025] predniSONE, 15 mg, oral, Daily   Followed by  [START ON 1/19/2025] predniSONE, 10 mg, oral, Daily   Followed by  [START ON 1/22/2025] predniSONE, 5 mg, oral, Daily  sacubitriL-valsartan, 0.5 tablet, oral, BID      Continuous medications     PRN medications  PRN medications: acetaminophen, albuterol, dextrose, dextrose, glucagon, glucagon     Objective     Vitals  Visit Vitals  BP (!) 139/97   Pulse (!) 115   Temp 36.5 °C (97.7 °F)   Resp 20   SpO2 95%   Smoking Status Never       Intake/Output Summary (Last 24 hours) at 1/16/2025 1439  Last data filed at 1/16/2025 0930  Gross per 24 hour   Intake 360 ml   Output --   Net 360 ml       Physical Exam  General: A&Ox3, NAD.  HEENT: Normocephalic, atraumatic. EOMI. MMM. No cervical lymphadenopathy.   Respiratory: Decreased breaths sounds bilateral bases. +ve wheeze throughout. No crackles or rhonchi. On 2L.  Cardiovascular: Sinus tachycardia. Distant heart sounds. No murmurs, gallops, or rubs.   Abdominal: Soft, distended due to body habitus, nontender. No rebound tenderness or involuntary guarding.   MSK: 3+ pitting edema to just above knee bilateral LE.   Skin: Warm, dry. No rashes or wounds.  Neuro: No focal neuro deficits.  Psych: Appropriate mood and affect.    Labs  Lab Results   Component Value Date    WBC 15.9 (H) 01/16/2025    HGB 17.0 (H) 01/16/2025    HCT 59.7 (H)  01/16/2025    MCV 95 01/16/2025     01/16/2025      Lab Results   Component Value Date    GLUCOSE 153 (H) 01/16/2025    CALCIUM 9.2 01/16/2025     01/16/2025    K 4.5 01/16/2025    CO2 35 (H) 01/16/2025    CL 99 01/16/2025    BUN 20 01/16/2025    CREATININE 1.19 (H) 01/16/2025      Lab Results   Component Value Date    ALT 30 01/15/2025    AST 20 01/15/2025    ALKPHOS 81 01/15/2025    BILITOT 0.8 01/15/2025        Imaging  === 01/15/25 ===    CT ANGIO CHEST FOR PULMONARY EMBOLISM    - Impression -  1.  No pulmonary embolism identified.  2. Severe cardiomegaly with large pericardial effusion. Reflux of  contrast into the distended hepatic veins suggests elevated right  heart pressures.  3. 3 mm right lower lobe nodule. Mildly enlarged subcarinal lymph  nodes perhaps reactive. Consider follow-up CT chest in 1 year to  document stability.  4. Partially imaged upper abdominal ascites and anasarca.         Assessment/Plan     Samina Park is a 49 y.o. female with a past medical history of uncontrolled HTN and asthma admitted on 1/16/2025 with acute decompensated heart failure iso newly diagnosed HFrEF.     #New ADHF, HFrEF NYHA Class III  #C/f pericardial effusion  #Sinus tachycardia  #Anasarca  #HTN  : :CT PE c/f large pericardial effusion, though noted as small on TTE  : :TTE on presentation showing EF 35-40%, abnormal LV diastolic filling, moderately dilated LA, small pericardial effusion   : :  : :TSH wnl   : :hemoglobin A1c 8.2%  : :UDS -ve  : :UA w/out massive proteinuria to suggest renal etiology   Plan:  -continue entresto 12/13 mg BID   -start carvedilol 6.25 mg BID   -will slowly initiate further GDMT (SGLT2, shady)  -additional x1 lasix 40 mg IV today  -cardiology consult for ischemic eval/cardiac catheterization     #HTN  #DLD  : :Tchol 155, HDL 27, LDL 92, Trig 183   : :hemogloboin A1c 8.2%  : :ASCVD score: 7.4% risk of cardiovascular event in next 10 years  Plan:  -start mod intensity  statin      #Leukocytosis  : :likely due to outpatient steroid course for misdiagnosis of asthma exacerbation   : :not meeting SIRS criteria, no possible source  Plan:  -prednisone taper  -continue to trend WBC  -monitor for infectious signs/symptoms    #Asthma  #c/f OHS and/or DARELL  : :home meds- albuterol inhaler and nebs PRN  : :VBG showing CO2 75, likely has retention at baseline  : :elevated bicarb likely compensation   : :unable to see PFTs in EMR  Plan:  -continue albuterol nebs for now  -patient will need nocturnal NIPPV, RT consulted   -outpatient sleep study & PFTs     #Depression  -continue home cymbalta     Diet: cardiac  DVT prophylaxis: Lovenox  Code status: Full code confirmed on admission  NOK: HARMEET Jenkins [phone]         Patient and plan discussed with attending physician.    ---  Isabelle Argueta MD   PGY1, Internal Medicine

## 2025-01-17 LAB
ALBUMIN SERPL BCP-MCNC: 3.6 G/DL (ref 3.4–5)
ALBUMIN SERPL BCP-MCNC: 3.9 G/DL (ref 3.4–5)
ALP SERPL-CCNC: 71 U/L (ref 33–110)
ALT SERPL W P-5'-P-CCNC: 23 U/L (ref 7–45)
ANION GAP SERPL CALC-SCNC: 12 MMOL/L (ref 10–20)
ANION GAP SERPL CALC-SCNC: 16 MMOL/L (ref 10–20)
AST SERPL W P-5'-P-CCNC: 18 U/L (ref 9–39)
ATRIAL RATE: 112 BPM
BASOPHILS # BLD AUTO: 0.02 X10*3/UL (ref 0–0.1)
BASOPHILS NFR BLD AUTO: 0.1 %
BILIRUB SERPL-MCNC: 1.1 MG/DL (ref 0–1.2)
BUN SERPL-MCNC: 17 MG/DL (ref 6–23)
BUN SERPL-MCNC: 19 MG/DL (ref 6–23)
CALCIUM SERPL-MCNC: 9.1 MG/DL (ref 8.6–10.6)
CALCIUM SERPL-MCNC: 9.6 MG/DL (ref 8.6–10.6)
CHLORIDE SERPL-SCNC: 91 MMOL/L (ref 98–107)
CHLORIDE SERPL-SCNC: 92 MMOL/L (ref 98–107)
CO2 SERPL-SCNC: 38 MMOL/L (ref 21–32)
CO2 SERPL-SCNC: 42 MMOL/L (ref 21–32)
CREAT SERPL-MCNC: 0.9 MG/DL (ref 0.5–1.05)
CREAT SERPL-MCNC: 0.95 MG/DL (ref 0.5–1.05)
EGFRCR SERPLBLD CKD-EPI 2021: 74 ML/MIN/1.73M*2
EGFRCR SERPLBLD CKD-EPI 2021: 79 ML/MIN/1.73M*2
EOSINOPHIL # BLD AUTO: 0.13 X10*3/UL (ref 0–0.7)
EOSINOPHIL NFR BLD AUTO: 0.9 %
ERYTHROCYTE [DISTWIDTH] IN BLOOD BY AUTOMATED COUNT: 13.6 % (ref 11.5–14.5)
GLUCOSE BLD MANUAL STRIP-MCNC: 154 MG/DL (ref 74–99)
GLUCOSE BLD MANUAL STRIP-MCNC: 155 MG/DL (ref 74–99)
GLUCOSE BLD MANUAL STRIP-MCNC: 157 MG/DL (ref 74–99)
GLUCOSE BLD MANUAL STRIP-MCNC: 205 MG/DL (ref 74–99)
GLUCOSE BLD MANUAL STRIP-MCNC: 220 MG/DL (ref 74–99)
GLUCOSE SERPL-MCNC: 135 MG/DL (ref 74–99)
GLUCOSE SERPL-MCNC: 139 MG/DL (ref 74–99)
HCT VFR BLD AUTO: 49.2 % (ref 36–46)
HGB BLD-MCNC: 14.4 G/DL (ref 12–16)
IMM GRANULOCYTES # BLD AUTO: 0.08 X10*3/UL (ref 0–0.7)
IMM GRANULOCYTES NFR BLD AUTO: 0.6 % (ref 0–0.9)
LYMPHOCYTES # BLD AUTO: 2.19 X10*3/UL (ref 1.2–4.8)
LYMPHOCYTES NFR BLD AUTO: 15.8 %
MAGNESIUM SERPL-MCNC: 2.27 MG/DL (ref 1.6–2.4)
MAGNESIUM SERPL-MCNC: 2.47 MG/DL (ref 1.6–2.4)
MCH RBC QN AUTO: 26.6 PG (ref 26–34)
MCHC RBC AUTO-ENTMCNC: 29.3 G/DL (ref 32–36)
MCV RBC AUTO: 91 FL (ref 80–100)
MONOCYTES # BLD AUTO: 1.03 X10*3/UL (ref 0.1–1)
MONOCYTES NFR BLD AUTO: 7.4 %
NEUTROPHILS # BLD AUTO: 10.39 X10*3/UL (ref 1.2–7.7)
NEUTROPHILS NFR BLD AUTO: 75.2 %
NRBC BLD-RTO: 0 /100 WBCS (ref 0–0)
P AXIS: 59 DEGREES
P OFFSET: 205 MS
P ONSET: 151 MS
PHOSPHATE SERPL-MCNC: 5.6 MG/DL (ref 2.5–4.9)
PLATELET # BLD AUTO: 310 X10*3/UL (ref 150–450)
POTASSIUM SERPL-SCNC: 4 MMOL/L (ref 3.5–5.3)
POTASSIUM SERPL-SCNC: 4.6 MMOL/L (ref 3.5–5.3)
PR INTERVAL: 138 MS
PROT SERPL-MCNC: 6.3 G/DL (ref 6.4–8.2)
Q ONSET: 220 MS
QRS COUNT: 18 BEATS
QRS DURATION: 100 MS
QT INTERVAL: 358 MS
QTC CALCULATION(BAZETT): 488 MS
QTC FREDERICIA: 440 MS
R AXIS: 116 DEGREES
RBC # BLD AUTO: 5.41 X10*6/UL (ref 4–5.2)
SODIUM SERPL-SCNC: 141 MMOL/L (ref 136–145)
SODIUM SERPL-SCNC: 141 MMOL/L (ref 136–145)
T AXIS: 17 DEGREES
T OFFSET: 399 MS
VENTRICULAR RATE: 112 BPM
WBC # BLD AUTO: 13.8 X10*3/UL (ref 4.4–11.3)

## 2025-01-17 PROCEDURE — 82947 ASSAY GLUCOSE BLOOD QUANT: CPT

## 2025-01-17 PROCEDURE — 84075 ASSAY ALKALINE PHOSPHATASE: CPT

## 2025-01-17 PROCEDURE — 2500000005 HC RX 250 GENERAL PHARMACY W/O HCPCS

## 2025-01-17 PROCEDURE — 2500000002 HC RX 250 W HCPCS SELF ADMINISTERED DRUGS (ALT 637 FOR MEDICARE OP, ALT 636 FOR OP/ED)

## 2025-01-17 PROCEDURE — 2500000004 HC RX 250 GENERAL PHARMACY W/ HCPCS (ALT 636 FOR OP/ED)

## 2025-01-17 PROCEDURE — 83735 ASSAY OF MAGNESIUM: CPT

## 2025-01-17 PROCEDURE — 99233 SBSQ HOSP IP/OBS HIGH 50: CPT

## 2025-01-17 PROCEDURE — 36415 COLL VENOUS BLD VENIPUNCTURE: CPT

## 2025-01-17 PROCEDURE — 85025 COMPLETE CBC W/AUTO DIFF WBC: CPT

## 2025-01-17 PROCEDURE — 2500000001 HC RX 250 WO HCPCS SELF ADMINISTERED DRUGS (ALT 637 FOR MEDICARE OP)

## 2025-01-17 PROCEDURE — 84100 ASSAY OF PHOSPHORUS: CPT

## 2025-01-17 PROCEDURE — 1200000002 HC GENERAL ROOM WITH TELEMETRY DAILY

## 2025-01-17 RX ORDER — FUROSEMIDE 10 MG/ML
80 INJECTION INTRAMUSCULAR; INTRAVENOUS 2 TIMES DAILY
Status: DISCONTINUED | OUTPATIENT
Start: 2025-01-17 | End: 2025-01-18

## 2025-01-17 RX ORDER — INSULIN GLARGINE 100 [IU]/ML
6 INJECTION, SOLUTION SUBCUTANEOUS NIGHTLY
Status: DISCONTINUED | OUTPATIENT
Start: 2025-01-17 | End: 2025-01-19

## 2025-01-17 RX ORDER — CIPROFLOXACIN HYDROCHLORIDE 3 MG/ML
2 SOLUTION/ DROPS OPHTHALMIC
Status: DISPENSED | OUTPATIENT
Start: 2025-01-17 | End: 2025-01-19

## 2025-01-17 RX ORDER — CIPROFLOXACIN HYDROCHLORIDE 3 MG/ML
1 SOLUTION/ DROPS OPHTHALMIC EVERY 4 HOURS
Status: DISCONTINUED | OUTPATIENT
Start: 2025-01-19 | End: 2025-01-23 | Stop reason: HOSPADM

## 2025-01-17 RX ORDER — SACUBITRIL AND VALSARTAN 24; 26 MG/1; MG/1
2 TABLET, FILM COATED ORAL 2 TIMES DAILY
Status: DISCONTINUED | OUTPATIENT
Start: 2025-01-17 | End: 2025-01-23 | Stop reason: HOSPADM

## 2025-01-17 RX ORDER — ALBUTEROL SULFATE 90 UG/1
2 INHALANT RESPIRATORY (INHALATION) EVERY 4 HOURS PRN
COMMUNITY
Start: 2023-09-15 | End: 2025-01-23 | Stop reason: HOSPADM

## 2025-01-17 RX ORDER — DULOXETIN HYDROCHLORIDE 60 MG/1
1 CAPSULE, DELAYED RELEASE ORAL
COMMUNITY
Start: 2023-12-01

## 2025-01-17 RX ORDER — MONTELUKAST SODIUM 10 MG/1
10 TABLET ORAL NIGHTLY
Status: DISCONTINUED | OUTPATIENT
Start: 2025-01-17 | End: 2025-01-23 | Stop reason: HOSPADM

## 2025-01-17 RX ORDER — ACETAZOLAMIDE 500 MG/5ML
500 INJECTION, POWDER, LYOPHILIZED, FOR SOLUTION INTRAVENOUS DAILY
Status: DISCONTINUED | OUTPATIENT
Start: 2025-01-17 | End: 2025-01-19

## 2025-01-17 RX ORDER — FUROSEMIDE 10 MG/ML
40 INJECTION INTRAMUSCULAR; INTRAVENOUS 2 TIMES DAILY
Status: DISCONTINUED | OUTPATIENT
Start: 2025-01-17 | End: 2025-01-17

## 2025-01-17 RX ORDER — ALBUTEROL SULFATE 0.83 MG/ML
2.5 SOLUTION RESPIRATORY (INHALATION) EVERY 6 HOURS PRN
COMMUNITY
Start: 2010-12-01

## 2025-01-17 RX ADMIN — CIPROFLOXACIN 2 DROP: 3 SOLUTION OPHTHALMIC at 20:20

## 2025-01-17 RX ADMIN — INSULIN LISPRO 2 UNITS: 100 INJECTION, SOLUTION INTRAVENOUS; SUBCUTANEOUS at 13:57

## 2025-01-17 RX ADMIN — FUROSEMIDE 40 MG: 10 INJECTION, SOLUTION INTRAVENOUS at 00:10

## 2025-01-17 RX ADMIN — ATORVASTATIN CALCIUM 40 MG: 40 TABLET, FILM COATED ORAL at 20:17

## 2025-01-17 RX ADMIN — MONTELUKAST 10 MG: 10 TABLET, FILM COATED ORAL at 20:17

## 2025-01-17 RX ADMIN — EMPAGLIFLOZIN 10 MG: 10 TABLET, FILM COATED ORAL at 11:38

## 2025-01-17 RX ADMIN — CIPROFLOXACIN 2 DROP: 3 SOLUTION OPHTHALMIC at 22:44

## 2025-01-17 RX ADMIN — Medication 2 L/MIN: at 20:19

## 2025-01-17 RX ADMIN — CIPROFLOXACIN 2 DROP: 3 SOLUTION OPHTHALMIC at 13:58

## 2025-01-17 RX ADMIN — CIPROFLOXACIN 2 DROP: 3 SOLUTION OPHTHALMIC at 18:39

## 2025-01-17 RX ADMIN — CARVEDILOL 6.25 MG: 6.25 TABLET, FILM COATED ORAL at 09:54

## 2025-01-17 RX ADMIN — Medication 2 L/MIN: at 09:55

## 2025-01-17 RX ADMIN — CARVEDILOL 6.25 MG: 6.25 TABLET, FILM COATED ORAL at 20:17

## 2025-01-17 RX ADMIN — INSULIN GLARGINE 6 UNITS: 100 INJECTION, SOLUTION SUBCUTANEOUS at 20:17

## 2025-01-17 RX ADMIN — DULOXETINE HYDROCHLORIDE 60 MG: 60 CAPSULE, DELAYED RELEASE ORAL at 09:54

## 2025-01-17 RX ADMIN — CIPROFLOXACIN 2 DROP: 3 SOLUTION OPHTHALMIC at 17:32

## 2025-01-17 RX ADMIN — SACUBITRIL AND VALSARTAN 2 TABLET: 24; 26 TABLET, FILM COATED ORAL at 20:17

## 2025-01-17 RX ADMIN — INSULIN LISPRO 1 UNITS: 100 INJECTION, SOLUTION INTRAVENOUS; SUBCUTANEOUS at 19:33

## 2025-01-17 RX ADMIN — FUROSEMIDE 80 MG: 10 INJECTION, SOLUTION INTRAMUSCULAR; INTRAVENOUS at 20:16

## 2025-01-17 RX ADMIN — INSULIN LISPRO 1 UNITS: 100 INJECTION, SOLUTION INTRAVENOUS; SUBCUTANEOUS at 09:58

## 2025-01-17 RX ADMIN — PREDNISONE 15 MG: 5 TABLET ORAL at 09:54

## 2025-01-17 RX ADMIN — ENOXAPARIN SODIUM 40 MG: 100 INJECTION SUBCUTANEOUS at 20:17

## 2025-01-17 RX ADMIN — ENOXAPARIN SODIUM 40 MG: 100 INJECTION SUBCUTANEOUS at 09:54

## 2025-01-17 ASSESSMENT — COGNITIVE AND FUNCTIONAL STATUS - GENERAL
DAILY ACTIVITIY SCORE: 24
MOBILITY SCORE: 24
DAILY ACTIVITIY SCORE: 24
MOBILITY SCORE: 24

## 2025-01-17 ASSESSMENT — PAIN SCALES - GENERAL
PAINLEVEL_OUTOF10: 0 - NO PAIN
PAINLEVEL_OUTOF10: 0 - NO PAIN

## 2025-01-17 ASSESSMENT — PAIN - FUNCTIONAL ASSESSMENT: PAIN_FUNCTIONAL_ASSESSMENT: 0-10

## 2025-01-17 NOTE — SIGNIFICANT EVENT
Rapid Response Nurse Note: RADAR alert: 6    Pager time: [phone]  Arrival time: 1245  Event end time: 1250  Location: T5  [] Triage by phone or secure messaging    Rapid response initiated by:  [] Rapid response RN [] Family [] Nursing Supervisor [] Physician   [x] RADAR auto page [] Sepsis auto-page [] RN [] RT   [] NP/PA [] Other:     Primary reason for call:   [] BAT [] New CPAP/BiPAP [] Bleeding [] Change in mental status   [] Chest pain [] Code blue [] FiO2 >/= 50% [] HR </= 40 bpm   [] HR >/= 130 bpm [] Hyperglycemia [] Hypoglycemia [x] RADAR    [] RR </= 8 bpm [] RR >/= 30 bpm [] SBP </= 90 mmHg [] SpO2 < 90%   [] Seizure [] Sepsis [] Shortness of breath  [] Staff concern: see comments     Initial VS and/or RADAR VS: T 36.2 °C; HR 96; RR 18; /70; SPO2 92%.    Providers present at bedside (if applicable):     Name of ICU Provider contacted (if applicable):     Interventions:  [x] None [] ABG/VBG [] Assist w/ICU transfer [] BAT paged    [] Bag mask [] Blood [] Cardioversion [] Code Blue   [] Code blue for intubation [] Code status changed [] Chest x-ray [] EKG   [] IV fluid/bolus [] KUB x-ray [] Labs/cultures [] Medication   [] Nebulizer treatment [] NIPPV (CPAP/BiPAP) [] Oxygen [] Oral airway   [] Peripheral IV [] Palliative care consult [] CT/MRI [] Sepsis protocol    [] Suctioned [] Other:     Outcome:  [] Coded and  [] Code blue for intubation [] Coded and transferred to ICU []  on division   [x] Remained on division (no change)tele [] Remained on division + additional monitoring [] Remained in ED [] Transferred to ED   [] Transferred to ICU [] Transferred to inpatient status [] Transferred for interventions (procedure) [] Transferred to ICU stepdown    [] Transferred to surgery [] Transferred to telemetry [] Sepsis protocol [] STEMI protocol   [] Stroke protocol [x] Bedside nurse instructed to page rapid response for any concerns or acute change in condition/VS     Additional Comments:  RADAR alert- score 6. Upon arrival pt walking around the room- no complaints. RR even and non labored in appearance. Updated with her bedside nurse and no immediate concerns.

## 2025-01-17 NOTE — NURSING NOTE
Patient transferred to [place] [address] from [address]. All her belongings were packed and sent with her. Her chart and hospital meds were packed and sent with her in a transfer folder as well. Nursing report was called to [place] [id number] by Aurora AGARWAL. The [facility] team was contacted and verified it was ok to transport her there off the tele monitor and placed a communication order for this. She was transported there by  transport.

## 2025-01-17 NOTE — PROGRESS NOTES
Pharmacy Admission Order Reconciliation Review    Samina Park is a 49 y.o. female admitted for Anasarca. Pharmacy reviewed the patient's unreconciled admission medications.    Prior to admission medications that were reviewed and acted on by the pharmacist include:  Albuterol nebulizer solution  Albuterol inhaler  Duloxetine   These medications have been reconciled.     Any other unreconcilied medications have been addressed and will be ordered or held by the patient's medical team. Medications addressed by the pharmacist may be added or changed by the patient's medical team at any time.    Prem Jenkins, PharmD  Transitions of Care Pharmacist  [facility]  Please reach out via Secure Chat for questions

## 2025-01-17 NOTE — CARE PLAN
Problem: Skin  Goal: Decreased wound size/increased tissue granulation at next dressing change  Outcome: Progressing  Flowsheets (Taken 1/17/2025 0022)  Decreased wound size/increased tissue granulation at next dressing change: Promote sleep for wound healing  Goal: Participates in plan/prevention/treatment measures  Outcome: Progressing  Flowsheets (Taken 1/17/2025 0022)  Participates in plan/prevention/treatment measures: Elevate heels  Goal: Prevent/manage excess moisture  Outcome: Progressing  Flowsheets (Taken 1/17/2025 0022)  Prevent/manage excess moisture: Moisturize dry skin  Goal: Prevent/minimize sheer/friction injuries  Outcome: Progressing  Flowsheets (Taken 1/17/2025 0022)  Prevent/minimize sheer/friction injuries:   HOB 30 degrees or less   Turn/reposition every 2 hours/use positioning/transfer devices  Goal: Promote/optimize nutrition  Outcome: Progressing  Flowsheets (Taken 1/17/2025 0022)  Promote/optimize nutrition: Consume > 50% meals/supplements  Goal: Promote skin healing  Outcome: Progressing  Flowsheets (Taken 1/17/2025 0022)  Promote skin healing:   Turn/reposition every 2 hours/use positioning/transfer devices   Protective dressings over bony prominences

## 2025-01-17 NOTE — PROGRESS NOTES
Internal Medicine Progress Note     Samina Park is a 49 y.o. female with a past medical history of uncontrolled HTN and asthma admitted on 1/16/2025 with acute decompensated heart failure iso newly diagnosed HFrEF.     Subjective     No acute events overnight and no new concerns this morning. Not having dyspnea at rest, on 2L nc.     Patient was transferred to Cardiology service given new onset HF diagnosis.     Patient was seen comfortable in bed this morning with no new concerns. She denies any worsening of her dyspnea. She denies any chest pain. However she does have lower extremity edema that is at her baseline.     ROS -ve unless stated otherwise.     TTE:   1. Poorly visualized anatomical structures due to suboptimal image quality.                           2. Left ventricular ejection fraction is moderately decreased, by visual estimate at 35-40%.   3. Spectral Doppler shows an abnormal pattern of left ventricular diastolic filling.   4. There is normal right ventricular global systolic function.   5. The left atrium is moderately dilated.   6. There is a small pericardial effusion.        Medications     Medications:   Scheduled medications  acetazolamide, 500 mg, intravenous, Daily  atorvastatin, 40 mg, oral, Nightly  carvedilol, 6.25 mg, oral, BID  ciprofloxacin, 2 drop, Left Eye, q2h   Followed by  [START ON 1/19/2025] ciprofloxacin, 1 drop, Left Eye, q4h  DULoxetine, 60 mg, oral, Daily  empagliflozin, 10 mg, oral, Daily  enoxaparin, 40 mg, subcutaneous, q12h BALA  furosemide, 80 mg, intravenous, BID  insulin glargine, 6 Units, subcutaneous, Nightly  insulin lispro, 0-5 Units, subcutaneous, TID AC  montelukast, 10 mg, oral, Nightly  oxygen, , inhalation, Continuous - Inhalation  predniSONE, 15 mg, oral, Daily   Followed by  [START ON 1/19/2025] predniSONE, 10 mg, oral, Daily   Followed by  [START ON 1/22/2025] predniSONE, 5 mg, oral, Daily  sacubitriL-valsartan, 2 tablet, oral, BID      Continuous  "medications     PRN medications  PRN medications: acetaminophen, albuterol, dextrose, dextrose, glucagon, glucagon, hydrALAZINE     Objective     Vitals  Visit Vitals  /70 (BP Location: Left arm, Patient Position: Sitting)   Pulse 96   Temp 36.2 °C (97.2 °F) (Temporal)   Resp 18   Ht 1.499 m (4' 11.02\")   Wt 104 kg (229 lb 11.5 oz)   SpO2 92%   BMI 46.37 kg/m²   Smoking Status Never   BSA 2.08 m²       Intake/Output Summary (Last 24 hours) at 1/17/2025 1356  Last data filed at 1/17/2025 1030  Gross per 24 hour   Intake 960 ml   Output --   Net 960 ml       Physical Exam  General: A&Ox3, NAD.  HEENT: Normocephalic, atraumatic. EOMI. MMM. No cervical lymphadenopathy.   Respiratory: Decreased breaths sounds bilateral bases. +ve wheeze throughout. No crackles or rhonchi. On 2L.  Cardiovascular: Sinus tachycardia. Distant heart sounds. No murmurs, gallops, or rubs.   Abdominal: Soft, distended due to body habitus, nontender. No rebound tenderness or involuntary guarding.   MSK: 3+ pitting edema bilateral LE.   Skin: Warm, dry. No rashes or wounds.  Neuro: No focal neuro deficits.  Psych: Appropriate mood and affect.    Labs  Lab Results   Component Value Date    WBC 13.8 (H) 01/17/2025    HGB 14.4 01/17/2025    HCT 49.2 (H) 01/17/2025    MCV 91 01/17/2025     01/17/2025      Lab Results   Component Value Date    GLUCOSE 139 (H) 01/17/2025    CALCIUM 9.1 01/17/2025     01/17/2025    K 4.0 01/17/2025    CO2 42 (HH) 01/17/2025    CL 91 (L) 01/17/2025    BUN 17 01/17/2025    CREATININE 0.90 01/17/2025      Lab Results   Component Value Date    ALT 23 01/17/2025    AST 18 01/17/2025    ALKPHOS 71 01/17/2025    BILITOT 1.1 01/17/2025        Imaging  === 01/15/25 ===    CT ANGIO CHEST FOR PULMONARY EMBOLISM    - Impression -  1.  No pulmonary embolism identified.  2. Severe cardiomegaly with large pericardial effusion. Reflux of  contrast into the distended hepatic veins suggests elevated right  heart " pressures.  3. 3 mm right lower lobe nodule. Mildly enlarged subcarinal lymph  nodes perhaps reactive. Consider follow-up CT chest in 1 year to  document stability.  4. Partially imaged upper abdominal ascites and anasarca.         Assessment/Plan     Samina Park is a 49-year-old female with PMH significant for HTN, HLD, obesity, asthma, depression. Patient presented as a transfer from [facility] where she presented with acute on chronic dyspnea worsening over the last few months which she initially attributed to poorly controlled asthma. Treated with several courses of glucocorticoids with no improvement and had recently been taking prescribed by urgent care earlier this week. She was found to be in acute decompensated heart failure with new HFrEF based on TTE findings here at [facility].     Update 01/17/25   - Patient was transferred to [facility] given new diagnosis   - She is feeling better and we are starting GDMT now on Entresto, jardiance,   - Started acetazolamide  - Gave Lasix 80 bid  - May have DARELL, will attempt to get CPAP for tonight.   - new diabetic dx- diabetic education ordered   - When euvolemic patient will benefit from Cardiac MR   - Upon discharge consider give GLP-1    #New ADHF, HFrEF NYHA Class III  #C/f pericardial effusion  #Sinus tachycardia  #Anasarca  #HTN  : :CT PE c/f large pericardial effusion, though noted as small on TTE  : :TTE on presentation showing EF 35-40%, abnormal LV diastolic filling, moderately dilated LA, small pericardial effusion   : :  : :TSH wnl   : :hemoglobin A1c 8.2%  : :UDS -ve  : :UA w/out massive proteinuria to suggest renal etiology   Plan:  -continue entresto 24/26 mg BID   -start carvedilol 6.25 mg BID   -will slowly initiate further GDMT (SGLT2, shady)  -started acetazolamide  -started Jardiance 10      #HTN  #DLD  : :Tchol 155, HDL 27, LDL 92, Trig 183   : :hemogloboin A1c 8.2%  : :ASCVD score: 7.4% risk of cardiovascular event in next 10  years  Plan:  -start mod intensity statin      #Leukocytosis  : :likely due to outpatient steroid course for misdiagnosis of asthma exacerbation   : :not meeting SIRS criteria, no possible source  Plan:  -prednisone taper  -continue to trend WBC  -monitor for infectious signs/symptoms    #Asthma  #c/f OHS and/or DARELL  : :home meds- albuterol inhaler and nebs PRN  : :VBG showing CO2 75, likely has retention at baseline  : :elevated bicarb likely compensation   : :unable to see PFTs in EMR  Plan:  -continue albuterol nebs for now  -patient will need nocturnal NIPPV, RT consulted   -outpatient sleep study & PFTs     #Depression  -continue home cymbalta     Diet: cardiac  DVT prophylaxis: Lovenox  Code status: Full code confirmed on admission  NOK: HARMEET Jenkins [phone]         Patient and plan discussed with attending physician.    ---  Juan Miguel Ruiz MD   PGY1, Internal Medicine

## 2025-01-17 NOTE — PROGRESS NOTES
Pharmacy Medication History Review    Samina Park is a 49 y.o. female admitted for Anasarca. Pharmacy reviewed the patient's [id number] medications and allergies for accuracy.    Medications ADDED:  All medications added to PTA list  Medications CHANGED:  N/A  Medications REMOVED:   N/A     The list below reflects the updated PTA list.   Prior to Admission Medications   Prescriptions Last Dose Informant   DULoxetine (Cymbalta) 60 mg DR capsule  Self   Sig: Take 1 capsule (60 mg) by mouth once daily in the morning. Take before meals.   Last dispensed through [facility] in December of 2023    albuterol 2.5 mg /3 mL (0.083 %) nebulizer solution  Self   Sig: Take 3 mL (2.5 mg) by nebulization every 6 hours if needed for wheezing or shortness of breath.  Last dispensed through [facility] in September of 2023    albuterol 90 mcg/actuation inhaler  Self   Sig: Inhale 2 puffs every 4 hours if needed for wheezing or shortness of breath.  Last dispensed through [facility] in September of 2023       Facility-Administered Medications: None       The list below reflects the updated allergy list. Please review each documented allergy for additional clarification and justification.  Allergies  Reviewed by Madie Soto RN on 1/16/2025   No Known Allergies         Patient accepts M2B at discharge. Pharmacy has been updated to [facility].    Sources  [facility]  Pharmacy dispense history  Patient interview Moderate historian  [facility]      Additional Comments  See highlighted comments in above table- compliance may be a concern     Prem Jenkins, PharmD  [facility]  [id number]  [facility], [address]  [place] [address]  Phone: [phone]  Please reach out via Secure Chat for questions, or if no response call [employer] or [employer]

## 2025-01-17 NOTE — SIGNIFICANT EVENT
Transfer of Service    Hospital Course:  Ms. Park is a 50 y/o female with past medical h/o uncontrolled HTN, asthma, and depression admitted on 1/16/25 as transfer from [facility] ED for acute on chronic dyspnea concerning for new diagnosis of acute decompensated heart failure with reduced EF. Initial vitals notable for sinus tachycardia with rates to 120s. She was hypertensive to 180s systolic, 120 diastolic. Labs notable for bicarb 35, . She additionally had a leukocytosis of 18.9 which given lack of infectious signs/symptoms was attributed to her being on steroids for possible asthma exacerbation. Trop -ve x2 (43-->44). EKG showed right axis deviation but no ischemic changes. CTPE showed no PE but did note severe cardiomegaly with large pericardial effusion and reflux of contrast into distended hepatic veins suggesting elevated right heart pressures. TTE noted only a small pericardial effusion which was reassuring, also showed LV EF 35-40% and moderately dilated LA. She was given x1 duonebs and admitted to medicine for further workup and management. Started entresto 12/13 mg BID, carvedilol 6.25 mg BID, atorvastatin 40 mg qhs, montelukast, scheduled albuterol nebs, prednisone taper. Spot dosing with IV lasix for goal net -ve 1-2 L. Her hemoglobin A1c is 8.2% for which she was started on lantus 6 units (first dose will be tonight 1/17) and SSI.     Meds started:   GDMT: carvedilol 6.25 mg BID, entresto 12/13 mg BID  Diuretic: lasix 40 mg IV BID/titrate to output (1-2L net -ve)  Asthma: albuterol nebs, montelukast, prednisone taper   T2DM: lantus 6 units (first dose will be tonight), SSI  DLD: atorvastatin 40 mg (ASCVD score: 7.4%, mod intensity)     To Do   -she needs nocturnal NIPPV, probably has component of elevated CO2 at baseline d/t DARELL/OHS, explains why bicarb elevated  -continue to titrate insulin regimen, needs diabetes educator consult, would be good candidate for GLP1 on discharge/PCP follow  up  -clarify asthma meds for discharge  -will need outpatient sleep study, PFTs (I ordered)   -GDMT/diuresis/further ischemic eval per cardiology team      ---  Isabelle Argueta MD  PGY1, Internal Medicine

## 2025-01-17 NOTE — CARE PLAN
Problem: Skin  Goal: Decreased wound size/increased tissue granulation at next dressing change  Outcome: Progressing  Goal: Participates in plan/prevention/treatment measures  Outcome: Progressing  Goal: Prevent/manage excess moisture  Outcome: Progressing  Goal: Prevent/minimize sheer/friction injuries  Outcome: Progressing  Goal: Promote/optimize nutrition  Outcome: Progressing  Goal: Promote skin healing  Outcome: Progressing     Problem: Safety - Adult  Goal: Free from fall injury  Outcome: Progressing     Problem: Discharge Planning  Goal: Discharge to home or other facility with appropriate resources  Outcome: Progressing     Problem: Chronic Conditions and Co-morbidities  Goal: Patient's chronic conditions and co-morbidity symptoms are monitored and maintained or improved  Outcome: Progressing     The patient's goals for the shift include Safety    The clinical goals for the shift include Pt will remain free of SOB

## 2025-01-17 NOTE — CONSULTS
"Inpatient Diabetes Education Consult    Reason for Visit:  Samina Park is a 49 y.o. female who presents for new diagnosis DM and HF    Consulting Service/Provider: Cardiology team    Visit Type: Initial visit    Visit Modality: In-person    Discharge Equipment/Supply Needs:       Patient has supplies at home:  n/a  has no supplies    Patient History and Assessment:  New diagnosis: Type 2  Previous diagnosis: Pre-diabetes for approx 2 ? years  Patient known to Diabetes Education department: No  Treatment prior to hospital admission:  n/a  Complications: cardiovascular disease, CAD s/p stent on 1/16/25, and obesity  PTA Medications:    Current Outpatient Medications   Medication Instructions    albuterol 90 mcg/actuation inhaler 2 puffs, inhalation, Every 4 hours PRN    albuterol 2.5 mg, nebulization, Every 6 hours PRN    DULoxetine (Cymbalta) 60 mg DR capsule 1 capsule, oral, Daily before breakfast       Glucose   Date/Time Value Ref Range Status   01/17/2025 05:19  (H) 74 - 99 mg/dL Final   01/16/2025 05:55  (H) 74 - 99 mg/dL Final   01/16/2025 05:28  (H) 74 - 99 mg/dL Final   01/15/2025 02:02  (H) 74 - 99 mg/dL Final     No results found for: \"CPEPTIDE\"  Hemoglobin A1C   Date Value Ref Range Status   01/15/2025 8.2 (H) See comment % Final   05/16/2020 5.7 4.7 - 6.4 % Final     Comment:     Interpretation:     Standardized A1c  Good control or normal:  4-6% ( mg/dL avg)  Moderate control:        6.1-8.0% (120-180 mg/dL avg)  Poor control:            >8.0% (180 mg/dL avg)  With 4% as a baseline, each 1% increase = 30 mg/dL increase in average   glucose.  Taken from DCCT (Diabetes Control Complications Trial)       Patient Learning/Readiness Assessment:  Agreeable to visit but states she is having a hard time remembering everything.    Interventions/Topics Covered:  See After Visit Summary for handouts/information sheets provided to patient.  Education Documentation  Healthy Food Options, " taught by Mayuri Mcgrath RN at 1/17/2025  3:23 PM.  Learner: Patient  Readiness: Acceptance  Method: Explanation, Handout, Teach-back  Response: Needs Reinforcement  Comment: focus on healthy and balanced meals    How Foods Affect Blood Sugar, taught by Mayuri Mcgrath RN at 1/17/2025  3:23 PM.  Learner: Patient  Readiness: Acceptance  Method: Explanation, Handout, Teach-back  Response: Needs Reinforcement  Comment: focus on healthy and balanced meals    Insulin Types, taught by Mayuri Mcgrath RN at 1/17/2025  3:22 PM.  Learner: Patient  Readiness: Acceptance  Method: Explanation, Handout, Teach-back  Response: Needs Reinforcement  Comment: per team may be Jardiance and GLP-1    Non-Insulin Medications, taught by Mayuri Mcgrath RN at 1/17/2025  3:22 PM.  Learner: Patient  Readiness: Acceptance  Method: Explanation, Handout, Teach-back  Response: Needs Reinforcement  Comment: per team may be Jardiance and GLP-1    Hypoglycemia, taught by Mayuri Mcgrath RN at 1/17/2025  3:22 PM.  Learner: Patient  Readiness: Acceptance  Method: Explanation, Handout  Response: Needs Reinforcement    Hyperglycemia, taught by Mayuri Mcgrath RN at 1/17/2025  3:22 PM.  Learner: Patient  Readiness: Acceptance  Method: Explanation, Handout  Response: Needs Reinforcement    Monitoring Blood Glucose, taught by Mayuri Mcgrath RN at 1/17/2025  3:22 PM.  Learner: Patient  Readiness: Acceptance  Method: Explanation, Handout  Response: Needs Reinforcement  Comment: discussed BG meter as well as A1c monitoring    Self-Care Behaviors, taught by Mayuri Mcgrath RN at 1/17/2025  3:21 PM.  Learner: Patient  Readiness: Acceptance  Method: Explanation, Handout  Response: Needs Reinforcement    Signs and Symptoms of Diabetes, taught by Mayuri Mcgrath RN at 1/17/2025  3:21 PM.  Learner: Patient  Readiness: Acceptance  Method: Explanation, Handout  Response: Needs Reinforcement  Comment: feels anxious and overwhelmed -- difficulty retaining information    How is Diabetes Diagnosed, taught by  Mayuri Mcgrath RN at 1/17/2025  3:21 PM.  Learner: Patient  Readiness: Acceptance  Method: Explanation, Handout  Response: Needs Reinforcement  Comment: feels anxious and overwhelmed -- difficulty retaining information    What is Diabetes, taught by Mayuri Mcgrath RN at 1/17/2025  3:21 PM.  Learner: Patient  Readiness: Acceptance  Method: Explanation, Handout  Response: Needs Reinforcement  Comment: feels anxious and overwhelmed -- difficulty retaining information          Additional topics covered: Beginning review of DM.  Education re A1c and meaning on BG values.  Discussion re her current med regimen while on steroids.  States she was told she may have pre-DM a couple years ago but when she lost her health insurance, she did not go back to the doctor.  She does not recall if she was prescribed any meds at that time.  Education re current regimen: Jardiance -- how it works and side effects of UTI and yeast infection. Enc her to ask for help with truong care as she has difficulty reaching this area.  RN made aware as well.  Education re GLP-1's and how they work -- will have her practice the demo pen next week.  Education re diet/nutrition.  She admits to drinking pop often and is able to make a change to sugar free soda or other sugar free beverages.  Reviewed sample meals for home and on her menu.  Will need a dietician to see for any other restrictions r/t HF.    Additional materials provided: Diabetes handbook    CGM:  n/a    Education Outcome/Recommendations:        Recommendations for bedside nursing: Allow patient to self-inject insulin (supervised)    Recommendations for Providers: Follow-up w/ PCP and/or Endocrinology    Additional Comments: Team made aware of this visit.  She will be inpatient for several days.  Will review information on 1/20 and proceed with education re GLP-1.  Time spent: 50 mins.

## 2025-01-18 LAB
ALBUMIN SERPL BCP-MCNC: 3.9 G/DL (ref 3.4–5)
ANION GAP SERPL CALC-SCNC: 11 MMOL/L (ref 10–20)
BASOPHILS # BLD AUTO: 0.05 X10*3/UL (ref 0–0.1)
BASOPHILS NFR BLD AUTO: 0.4 %
BUN SERPL-MCNC: 20 MG/DL (ref 6–23)
CALCIUM SERPL-MCNC: 9.4 MG/DL (ref 8.6–10.6)
CHLORIDE SERPL-SCNC: 91 MMOL/L (ref 98–107)
CO2 SERPL-SCNC: 44 MMOL/L (ref 21–32)
CREAT SERPL-MCNC: 0.94 MG/DL (ref 0.5–1.05)
EGFRCR SERPLBLD CKD-EPI 2021: 75 ML/MIN/1.73M*2
EOSINOPHIL # BLD AUTO: 0.13 X10*3/UL (ref 0–0.7)
EOSINOPHIL NFR BLD AUTO: 1 %
ERYTHROCYTE [DISTWIDTH] IN BLOOD BY AUTOMATED COUNT: 13.4 % (ref 11.5–14.5)
GLUCOSE BLD MANUAL STRIP-MCNC: 117 MG/DL (ref 74–99)
GLUCOSE BLD MANUAL STRIP-MCNC: 126 MG/DL (ref 74–99)
GLUCOSE BLD MANUAL STRIP-MCNC: 157 MG/DL (ref 74–99)
GLUCOSE BLD MANUAL STRIP-MCNC: 183 MG/DL (ref 74–99)
GLUCOSE BLD MANUAL STRIP-MCNC: 213 MG/DL (ref 74–99)
GLUCOSE BLD MANUAL STRIP-MCNC: 258 MG/DL (ref 74–99)
GLUCOSE SERPL-MCNC: 123 MG/DL (ref 74–99)
HCT VFR BLD AUTO: 49.8 % (ref 36–46)
HGB BLD-MCNC: 14.4 G/DL (ref 12–16)
HIV 1+2 AB+HIV1 P24 AG SERPL QL IA: NONREACTIVE
IMM GRANULOCYTES # BLD AUTO: 0.17 X10*3/UL (ref 0–0.7)
IMM GRANULOCYTES NFR BLD AUTO: 1.4 % (ref 0–0.9)
LYMPHOCYTES # BLD AUTO: 2.49 X10*3/UL (ref 1.2–4.8)
LYMPHOCYTES NFR BLD AUTO: 20.1 %
MAGNESIUM SERPL-MCNC: 2.46 MG/DL (ref 1.6–2.4)
MCH RBC QN AUTO: 26.4 PG (ref 26–34)
MCHC RBC AUTO-ENTMCNC: 28.9 G/DL (ref 32–36)
MCV RBC AUTO: 91 FL (ref 80–100)
MONOCYTES # BLD AUTO: 1.12 X10*3/UL (ref 0.1–1)
MONOCYTES NFR BLD AUTO: 9 %
NEUTROPHILS # BLD AUTO: 8.43 X10*3/UL (ref 1.2–7.7)
NEUTROPHILS NFR BLD AUTO: 68.1 %
NRBC BLD-RTO: 0 /100 WBCS (ref 0–0)
PHOSPHATE SERPL-MCNC: 5.2 MG/DL (ref 2.5–4.9)
PLATELET # BLD AUTO: 303 X10*3/UL (ref 150–450)
POTASSIUM SERPL-SCNC: 3.6 MMOL/L (ref 3.5–5.3)
RBC # BLD AUTO: 5.46 X10*6/UL (ref 4–5.2)
SODIUM SERPL-SCNC: 142 MMOL/L (ref 136–145)
URATE SERPL-MCNC: 8 MG/DL (ref 2.3–6.7)
WBC # BLD AUTO: 12.4 X10*3/UL (ref 4.4–11.3)

## 2025-01-18 PROCEDURE — 80069 RENAL FUNCTION PANEL: CPT

## 2025-01-18 PROCEDURE — 2500000004 HC RX 250 GENERAL PHARMACY W/ HCPCS (ALT 636 FOR OP/ED)

## 2025-01-18 PROCEDURE — 87389 HIV-1 AG W/HIV-1&-2 AB AG IA: CPT

## 2025-01-18 PROCEDURE — 36415 COLL VENOUS BLD VENIPUNCTURE: CPT

## 2025-01-18 PROCEDURE — 2500000002 HC RX 250 W HCPCS SELF ADMINISTERED DRUGS (ALT 637 FOR MEDICARE OP, ALT 636 FOR OP/ED)

## 2025-01-18 PROCEDURE — 2500000001 HC RX 250 WO HCPCS SELF ADMINISTERED DRUGS (ALT 637 FOR MEDICARE OP)

## 2025-01-18 PROCEDURE — 82947 ASSAY GLUCOSE BLOOD QUANT: CPT

## 2025-01-18 PROCEDURE — 99233 SBSQ HOSP IP/OBS HIGH 50: CPT

## 2025-01-18 PROCEDURE — 2500000005 HC RX 250 GENERAL PHARMACY W/O HCPCS

## 2025-01-18 PROCEDURE — 84550 ASSAY OF BLOOD/URIC ACID: CPT

## 2025-01-18 PROCEDURE — 1200000002 HC GENERAL ROOM WITH TELEMETRY DAILY

## 2025-01-18 PROCEDURE — 99223 1ST HOSP IP/OBS HIGH 75: CPT

## 2025-01-18 PROCEDURE — 83735 ASSAY OF MAGNESIUM: CPT

## 2025-01-18 PROCEDURE — 85025 COMPLETE CBC W/AUTO DIFF WBC: CPT

## 2025-01-18 RX ORDER — TALC
6 POWDER (GRAM) TOPICAL DAILY
Status: DISCONTINUED | OUTPATIENT
Start: 2025-01-18 | End: 2025-01-23 | Stop reason: HOSPADM

## 2025-01-18 RX ORDER — SPIRONOLACTONE 25 MG/1
25 TABLET ORAL DAILY
Status: DISCONTINUED | OUTPATIENT
Start: 2025-01-18 | End: 2025-01-23 | Stop reason: HOSPADM

## 2025-01-18 RX ORDER — FUROSEMIDE 10 MG/ML
80 INJECTION INTRAMUSCULAR; INTRAVENOUS DAILY
Status: DISCONTINUED | OUTPATIENT
Start: 2025-01-19 | End: 2025-01-20

## 2025-01-18 RX ORDER — POTASSIUM CHLORIDE 20 MEQ/1
40 TABLET, EXTENDED RELEASE ORAL ONCE
Status: COMPLETED | OUTPATIENT
Start: 2025-01-18 | End: 2025-01-18

## 2025-01-18 RX ADMIN — CARVEDILOL 6.25 MG: 6.25 TABLET, FILM COATED ORAL at 08:26

## 2025-01-18 RX ADMIN — CIPROFLOXACIN 2 DROP: 3 SOLUTION OPHTHALMIC at 02:09

## 2025-01-18 RX ADMIN — SPIRONOLACTONE 25 MG: 25 TABLET, FILM COATED ORAL at 11:07

## 2025-01-18 RX ADMIN — ENOXAPARIN SODIUM 40 MG: 100 INJECTION SUBCUTANEOUS at 08:25

## 2025-01-18 RX ADMIN — CIPROFLOXACIN 2 DROP: 3 SOLUTION OPHTHALMIC at 14:53

## 2025-01-18 RX ADMIN — ENOXAPARIN SODIUM 40 MG: 100 INJECTION SUBCUTANEOUS at 20:23

## 2025-01-18 RX ADMIN — CIPROFLOXACIN 2 DROP: 3 SOLUTION OPHTHALMIC at 04:52

## 2025-01-18 RX ADMIN — ATORVASTATIN CALCIUM 40 MG: 40 TABLET, FILM COATED ORAL at 20:23

## 2025-01-18 RX ADMIN — Medication 2 L/MIN: at 08:29

## 2025-01-18 RX ADMIN — SACUBITRIL AND VALSARTAN 2 TABLET: 24; 26 TABLET, FILM COATED ORAL at 08:27

## 2025-01-18 RX ADMIN — CIPROFLOXACIN 2 DROP: 3 SOLUTION OPHTHALMIC at 08:27

## 2025-01-18 RX ADMIN — CIPROFLOXACIN 2 DROP: 3 SOLUTION OPHTHALMIC at 11:07

## 2025-01-18 RX ADMIN — CIPROFLOXACIN 2 DROP: 3 SOLUTION OPHTHALMIC at 18:46

## 2025-01-18 RX ADMIN — CIPROFLOXACIN 2 DROP: 3 SOLUTION OPHTHALMIC at 20:23

## 2025-01-18 RX ADMIN — SACUBITRIL AND VALSARTAN 2 TABLET: 24; 26 TABLET, FILM COATED ORAL at 20:23

## 2025-01-18 RX ADMIN — CIPROFLOXACIN 2 DROP: 3 SOLUTION OPHTHALMIC at 00:23

## 2025-01-18 RX ADMIN — EMPAGLIFLOZIN 10 MG: 10 TABLET, FILM COATED ORAL at 08:26

## 2025-01-18 RX ADMIN — CIPROFLOXACIN 2 DROP: 3 SOLUTION OPHTHALMIC at 00:25

## 2025-01-18 RX ADMIN — CIPROFLOXACIN 2 DROP: 3 SOLUTION OPHTHALMIC at 17:18

## 2025-01-18 RX ADMIN — MONTELUKAST 10 MG: 10 TABLET, FILM COATED ORAL at 20:23

## 2025-01-18 RX ADMIN — CIPROFLOXACIN 2 DROP: 3 SOLUTION OPHTHALMIC at 13:14

## 2025-01-18 RX ADMIN — INSULIN LISPRO 1 UNITS: 100 INJECTION, SOLUTION INTRAVENOUS; SUBCUTANEOUS at 18:46

## 2025-01-18 RX ADMIN — CARVEDILOL 6.25 MG: 6.25 TABLET, FILM COATED ORAL at 20:23

## 2025-01-18 RX ADMIN — FUROSEMIDE 80 MG: 10 INJECTION, SOLUTION INTRAMUSCULAR; INTRAVENOUS at 08:25

## 2025-01-18 RX ADMIN — POTASSIUM CHLORIDE 40 MEQ: 1500 TABLET, EXTENDED RELEASE ORAL at 22:50

## 2025-01-18 RX ADMIN — INSULIN LISPRO 1 UNITS: 100 INJECTION, SOLUTION INTRAVENOUS; SUBCUTANEOUS at 13:46

## 2025-01-18 RX ADMIN — PREDNISONE 15 MG: 5 TABLET ORAL at 08:27

## 2025-01-18 RX ADMIN — ACETAZOLAMIDE 500 MG: 500 INJECTION, POWDER, LYOPHILIZED, FOR SOLUTION INTRAVENOUS at 08:24

## 2025-01-18 RX ADMIN — CIPROFLOXACIN 2 DROP: 3 SOLUTION OPHTHALMIC at 22:50

## 2025-01-18 RX ADMIN — Medication 6 MG: at 23:20

## 2025-01-18 RX ADMIN — INSULIN GLARGINE 6 UNITS: 100 INJECTION, SOLUTION SUBCUTANEOUS at 20:23

## 2025-01-18 RX ADMIN — CIPROFLOXACIN 2 DROP: 3 SOLUTION OPHTHALMIC at 06:11

## 2025-01-18 RX ADMIN — Medication 2 L/MIN: at 20:25

## 2025-01-18 ASSESSMENT — COGNITIVE AND FUNCTIONAL STATUS - GENERAL
DAILY ACTIVITIY SCORE: 23
PERSONAL GROOMING: A LITTLE
MOBILITY SCORE: 24

## 2025-01-18 ASSESSMENT — PAIN SCALES - GENERAL
PAINLEVEL_OUTOF10: 0 - NO PAIN

## 2025-01-18 ASSESSMENT — PAIN SCALES - WONG BAKER: WONGBAKER_NUMERICALRESPONSE: NO HURT

## 2025-01-18 ASSESSMENT — PAIN - FUNCTIONAL ASSESSMENT: PAIN_FUNCTIONAL_ASSESSMENT: 0-10

## 2025-01-18 NOTE — CARE PLAN
The patient's goals for the shift include Safety    The clinical goals for the shift include Pt will remain free of SOB    Over the shift, the patient did not make progress toward the following goals. Barriers to progression include . Recommendations to address these barriers include .

## 2025-01-18 NOTE — PROGRESS NOTES
"  Internal Medicine Progress Note     Samina Park is a 49 y.o. female with a past medical history of uncontrolled HTN and asthma admitted on 1/16/2025 with acute decompensated heart failure iso newly diagnosed HFrEF.     Subjective     Overnight: NAEON.     This morning, patient states she feels like her breathing is much improved with diuresis. She is currently on 2 L LFNC for comfort. She endorses this ongoing lower extremity edema is new for her prior to this admission. No chest pain, shortness of breath, abdominal pain, head ache, or vision changes.        Medications     Medications:   Scheduled medications  acetazolamide, 500 mg, intravenous, Daily  atorvastatin, 40 mg, oral, Nightly  carvedilol, 6.25 mg, oral, BID  ciprofloxacin, 2 drop, Left Eye, q2h   Followed by  [START ON 1/19/2025] ciprofloxacin, 1 drop, Left Eye, q4h  empagliflozin, 10 mg, oral, Daily  enoxaparin, 40 mg, subcutaneous, q12h BALA  furosemide, 80 mg, intravenous, BID  insulin glargine, 6 Units, subcutaneous, Nightly  insulin lispro, 0-5 Units, subcutaneous, TID AC  montelukast, 10 mg, oral, Nightly  oxygen, , inhalation, Continuous - Inhalation  [START ON 1/19/2025] predniSONE, 10 mg, oral, Daily   Followed by  [START ON 1/22/2025] predniSONE, 5 mg, oral, Daily  sacubitriL-valsartan, 2 tablet, oral, BID  spironolactone, 25 mg, oral, Daily      Continuous medications     PRN medications  PRN medications: acetaminophen, albuterol, dextrose, dextrose, glucagon, glucagon, hydrALAZINE     Objective     Vitals  Visit Vitals  /67 (BP Location: Left arm, Patient Position: Lying)   Pulse 93   Temp 36 °C (96.8 °F) (Temporal)   Resp 20   Ht 1.499 m (4' 11.02\")   Wt 112 kg (245 lb 15.8 oz)   SpO2 97%   BMI 49.66 kg/m²   Smoking Status Never   BSA 2.16 m²       Intake/Output Summary (Last 24 hours) at 1/18/2025 0923  Last data filed at 1/18/2025 0423  Gross per 24 hour   Intake 480 ml   Output 4150 ml   Net -3670 ml       Physical Exam  General: " A&Ox3, NAD.  HEENT: Normocephalic, atraumatic. EOMI. MMM. No cervical lymphadenopathy.   Respiratory: Decreased breaths sounds bilateral bases. Mixed end expiratory wheezes and crackles bilaterally. On 2L.  Cardiovascular: Sinus tachycardia. Distant heart sounds. No murmurs, gallops, or rubs.   Abdominal: Soft, distended due to body habitus, nontender. No rebound tenderness or involuntary guarding.   MSK: 3+ pitting edema bilateral LE.   Skin: Warm, dry. No rashes or wounds.  Neuro: No focal neuro deficits.  Psych: Appropriate mood and affect.    Labs  Lab Results   Component Value Date    WBC 12.4 (H) 01/18/2025    HGB 14.4 01/18/2025    HCT 49.8 (H) 01/18/2025    MCV 91 01/18/2025     01/18/2025      Lab Results   Component Value Date    GLUCOSE 135 (H) 01/17/2025    CALCIUM 9.6 01/17/2025     01/17/2025    K 4.6 01/17/2025    CO2 38 (H) 01/17/2025    CL 92 (L) 01/17/2025    BUN 19 01/17/2025    CREATININE 0.95 01/17/2025      Lab Results   Component Value Date    ALT 23 01/17/2025    AST 18 01/17/2025    ALKPHOS 71 01/17/2025    BILITOT 1.1 01/17/2025        Imaging  === 01/15/25 ===    CT ANGIO CHEST FOR PULMONARY EMBOLISM    - Impression -  1.  No pulmonary embolism identified.  2. Severe cardiomegaly with large pericardial effusion. Reflux of  contrast into the distended hepatic veins suggests elevated right  heart pressures.  3. 3 mm right lower lobe nodule. Mildly enlarged subcarinal lymph  nodes perhaps reactive. Consider follow-up CT chest in 1 year to  document stability.  4. Partially imaged upper abdominal ascites and anasarca.    TTE (01/15/25):  1. Poorly visualized anatomical structures due to suboptimal image quality.                           2. Left ventricular ejection fraction is moderately decreased, by visual estimate at 35-40%.   3. Spectral Doppler shows an abnormal pattern of left ventricular diastolic filling.   4. There is normal right ventricular global systolic function.    5. The left atrium is moderately dilated.   6. There is a small pericardial effusion.       Assessment/Plan     Samina Park is a 49-year-old female with PMH significant for HTN, HLD, obesity, asthma, depression. Patient presented as a transfer from [facility] where she presented with acute on chronic dyspnea worsening over the last few months which she initially attributed to poorly controlled asthma. Treated with several courses of glucocorticoids with no improvement and had recently been taking prescribed by urgent care earlier this week. She was found to be in acute decompensated heart failure with new HFrEF based on TTE findings here at [facility].     #New ADHF, HFrEF NYHA Class III  #C/f pericardial effusion  #Sinus tachycardia  #Anasarca  #HTN  :: CT PE c/f large pericardial effusion, though noted as small on TTE  :: TTE on presentation showing EF 35-40%, abnormal LV diastolic filling, moderately dilated LA, small pericardial effusion   ::   :: TSH wnl   :: hemoglobin A1c 8.2%  :: UDS -ve  :: UA w/out massive proteinuria to suggest renal etiology   Plan:  - Entresto 50 mg BID   - carvedilol 6.25 mg BID   - Jardiance 10 mg every day  - Spironolactone 25 mg every day   - Lasix 80 IV BID  - Acetazolamide 500 mg daily  - HIV testing  - Plan for cardiac MRI when able to lay flat    #HTN  #DLD  : :Tchol 155, HDL 27, LDL 92, Trig 183   : :hemogloboin A1c 8.2%  : :ASCVD score: 7.4% risk of cardiovascular event in next 10 years  Plan:  -Atorvastatin 40 mg daily     #Leukocytosis  :: likely due to outpatient steroid course for misdiagnosis of asthma exacerbation   :: not meeting SIRS criteria, no possible source  Plan:  -prednisone taper  -continue to trend WBC  -monitor for infectious signs/symptoms    #Asthma  #c/f OHS and/or DARELL  :: home meds- albuterol inhaler and nebs PRN  :: VBG showing CO2 75, likely has retention at baseline  :: elevated bicarb likely compensation   :: unable to see PFTs in  EMR  Plan:  -continue albuterol nebs for now  - No plans for CPAP/BiPAP while inpatient   - outpatient sleep study & PFTs   - discharge with LABA/ICS prn     #Depression  -continue home cymbalta     F: Avoiding in ADHF   E: PRN,    N: Cardiac  A: pIV  DVT prophylaxis: Lovenox  Code status: Full code confirmed on admission  NOK: HARMEET Jenkins [phone]     ---  James Troy MD   Internal Medicine/Pediatrics PGY-1

## 2025-01-18 NOTE — DOCUMENTATION CLARIFICATION NOTE
"    PATIENT:               DANIELA LANTIGUA  ACCT #:                  [id number]  MRN:                       [id number]  :                       1975  ADMIT DATE:       2025 12:20 AM  DISCH DATE:  RESPONDING PROVIDER #:        [id number]          PROVIDER RESPONSE TEXT:    Hypertensive Emergency    CDI QUERY TEXT:    Clarification    Instruction:    Based on your assessment of the patient and the clinical information, please provide the requested documentation by clicking on the appropriate radio button and enter any additional information if prompted.    Question: Based on the clinical criteria, can the patient diagnosis of HTN be further specified as    When answering this query, please exercise your independent professional judgment. The fact that a question is being asked, does not imply that any particular answer is desired or expected.    The patient's clinical indicators include:  Clinical Information:  Patient is a 49 year old female who initially presented to [facility] ED with acute on chronic dyspnea.  She was transferred to [facility] for further workup and management.    Clinical Indicators:  -From the Cardiac Consult on , \" Malignant HTN (Uncontrolled) \"    -From the Cardiac Consult on , \" Cardiology was consulted after an echocardiogram showed new onset HFrEF with an EF of 35-40%, in the setting of ADHF \"    -On  at 0356 /111, at 0745 /120, at 1305 /97    Treatment:  Hydralazine 10mg IVP per parameters PRN, Coreg 6.25mg tab PO BID, Entresto 24-26mg  0.5tab PO BID, Cardiology consult, telemetry, Lasix 40mg IVP for volume overload, safety precautions    Risk Factors:  Elevated BP, new diagnosis of ADHF, HLD, malignant HTN, tachycardia, volume overload, severe lower extremity edema  Options provided:  -- Hypertensive Emergency  -- Hypertensive Crisis  -- Hypertensive Urgency  -- Other - I will add my own diagnosis  -- Refer to Clinical Documentation Reviewer    Query created by: " Clarissa Guidry on 1/17/2025 12:18 PM      Electronically signed by:  DELPHINE FIGUEROA MD 1/17/2025 10:38 PM

## 2025-01-18 NOTE — CONSULTS
Inpatient consult to Pulmonology  Consult performed by: Luis Bowling MD  Consult ordered by: Ty Schilling MD  Reason for consult: Shortness of breath/Asthma  Assessment/Recommendations: As per notes        Reason For Consult  Asthma/Shortness of breath/Chronic hypercapnic resp failure    History Of Present Illness  Samina aPrk is a 49 y.o. female presenting with difficulty in breathing.     Ms Park is a 49 y.o. female, Never smoker, with PMHx of Childhood Asthma (no PFTs seen), HTN and Depression who presents to [facility] as a transfer from [facility] ED where she presented with a progressive worsening of shortness of breath over the past few months. Pulmonary consulted for Asthma and chronic hypercapnic respiratory failure management.    Patient noticed shortness of breath which did not respond to her Inhalers. Over the past month, symptom has gotten worse. Now she feels short of breath on minimal exertion which has limited her ability to work. She has received several treatment for what was considered poorly controlled asthma with steroids and breathing treatments without significant changes. Was last treated with a course of prednisone prescribed by urgent care earlier this week. Over the period, she has also noted progressive leg swelling, orthopnea and PND but denies any chest pain. Weight increased from . Reports cough productive of clear sputum, not frothy and no hemoptysis.    Diagnosed of Asthma as a child (in 3rd grade). She has been on some Inhalers in the past (Advair, Symbicort) but does not current have any at this time. She has Albuterol inhaler for PRN use. She does not remember the last time she required her Inhaler. She has had previous admissions related to the Asthma but nothing recent. Denies night time awakening. Does not know of any significant triggers for her Asthma attacks. Denies any recent ill contacts or long distance travel. Denies any recent medication changes or dietary  changes. She admits to snoring at night but has no formal diagnosis of sleep apnea.  She does not know of any significant environmental exposures.  Denies headache, seizures, confusions or loss of consciousness.      Past Medical History  She has no past medical history on file.  As above    Surgical History  She has no past surgical history on file.     Social History  She reports that she has never smoked. She has never been exposed to tobacco smoke. She has never used smokeless tobacco. She reports that she does not drink alcohol and does not use drugs.    Family History  No family history on file.     Allergies  Patient has no known allergies.    Review of Systems  Nil of significance except as stated above     Physical Exam  Afebrile, anicteric  Atraumatic, Normocephalic  Chest: not tachypneic, PN resonant, reduced air entry, few scattered crackles, no wheeze  HS I and II present and normal, no murmurs  Abdomen full, non-tender  Bilateral pitting pedal edema up to knee  Conscious, alert, oriented in time, place and person, seen mobilizing in the room     Last Recorded Vitals  /67 (BP Location: Left arm, Patient Position: Lying)   Pulse 93   Temp 36 °C (96.8 °F) (Temporal)   Resp 20   Wt 112 kg (245 lb 15.8 oz)   SpO2 97%     Relevant Results  Results for orders placed or performed during the hospital encounter of 01/16/25 (from the past 24 hours)   POCT GLUCOSE   Result Value Ref Range    POCT Glucose 220 (H) 74 - 99 mg/dL   POCT GLUCOSE   Result Value Ref Range    POCT Glucose 205 (H) 74 - 99 mg/dL   Renal Function Panel   Result Value Ref Range    Glucose 135 (H) 74 - 99 mg/dL    Sodium 141 136 - 145 mmol/L    Potassium 4.6 3.5 - 5.3 mmol/L    Chloride 92 (L) 98 - 107 mmol/L    Bicarbonate 38 (H) 21 - 32 mmol/L    Anion Gap 16 10 - 20 mmol/L    Urea Nitrogen 19 6 - 23 mg/dL    Creatinine 0.95 0.50 - 1.05 mg/dL    eGFR 74 >60 mL/min/1.73m*2    Calcium 9.6 8.6 - 10.6 mg/dL    Phosphorus 5.6 (H) 2.5 -  4.9 mg/dL    Albumin 3.9 3.4 - 5.0 g/dL   Magnesium   Result Value Ref Range    Magnesium 2.47 (H) 1.60 - 2.40 mg/dL   POCT GLUCOSE   Result Value Ref Range    POCT Glucose 157 (H) 74 - 99 mg/dL   POCT GLUCOSE   Result Value Ref Range    POCT Glucose 117 (H) 74 - 99 mg/dL   CBC and Auto Differential   Result Value Ref Range    WBC 12.4 (H) 4.4 - 11.3 x10*3/uL    nRBC 0.0 0.0 - 0.0 /100 WBCs    RBC 5.46 (H) 4.00 - 5.20 x10*6/uL    Hemoglobin 14.4 12.0 - 16.0 g/dL    Hematocrit 49.8 (H) 36.0 - 46.0 %    MCV 91 80 - 100 fL    MCH 26.4 26.0 - 34.0 pg    MCHC 28.9 (L) 32.0 - 36.0 g/dL    RDW 13.4 11.5 - 14.5 %    Platelets 303 150 - 450 x10*3/uL    Neutrophils % 68.1 40.0 - 80.0 %    Immature Granulocytes %, Automated 1.4 (H) 0.0 - 0.9 %    Lymphocytes % 20.1 13.0 - 44.0 %    Monocytes % 9.0 2.0 - 10.0 %    Eosinophils % 1.0 0.0 - 6.0 %    Basophils % 0.4 0.0 - 2.0 %    Neutrophils Absolute 8.43 (H) 1.20 - 7.70 x10*3/uL    Immature Granulocytes Absolute, Automated 0.17 0.00 - 0.70 x10*3/uL    Lymphocytes Absolute 2.49 1.20 - 4.80 x10*3/uL    Monocytes Absolute 1.12 (H) 0.10 - 1.00 x10*3/uL    Eosinophils Absolute 0.13 0.00 - 0.70 x10*3/uL    Basophils Absolute 0.05 0.00 - 0.10 x10*3/uL       Imaging:  Electrocardiogram, 12-lead PRN ACS symptoms  Result Date: 1/17/2025  Sinus tachycardia Possible Left atrial enlargement Low voltage QRS Left posterior fascicular block Abnormal ECG When compared with ECG of 16-JAN-2025 04:57, QRS duration has increased ST no longer depressed in Anterior leads T wave inversion no longer evident in Anterior leads Confirmed by Ab Yee ([id number]) on 1/17/2025 4:44:39 PM    ECG 12 lead  Result Date: 1/16/2025  Sinus tachycardia Possible Left atrial enlargement Right axis deviation Low voltage QRS Nonspecific T wave abnormality Abnormal ECG No previous ECGs available    Transthoracic Echo (TTE) Complete  Result Date: 1/15/2025  CONCLUSIONS:    1. Poorly visualized anatomical structures due to  suboptimal image quality.    2. Left ventricular ejection fraction is moderately decreased, by visual estimate at 35-40%.    3. Spectral Doppler shows an abnormal pattern of left ventricular diastolic filling.    4. There is normal right ventricular global systolic function.    5. The left atrium is moderately dilated.    6. There is a small pericardial effusion.     CT angio chest for pulmonary embolism  Result Date: 1/15/2025  1.  No pulmonary embolism identified.   2. Severe cardiomegaly with large pericardial effusion. Reflux of contrast into the distended hepatic veins suggests elevated right heart pressures.   3. 3 mm right lower lobe nodule. Mildly enlarged subcarinal lymph nodes perhaps reactive. Consider follow-up CT chest in 1 year to document stability. 4. Partially imaged upper abdominal ascites and anasarca.     Signed by: Elpidio Washington 1/15/2025 3:33 PM Dictation workstation:   [id number]           Assessment/Plan   Ms Park is a 49 y.o. female, Never smoker, with PMHx of Childhood Asthma (no PFTs seen), HTN and Depression who presents to [facility] as a transfer from [facility] ED where she presented with a progressive worsening of shortness of breath over the past few months. Pulmonary consulted for Asthma and chronic hypercapnic respiratory failure management.    #Dyspnea on exertion  Etiology: Acute Decompensated Heart Failure  #Mild Intermittent Asthma  #Chronic hypercapnic Respiratory failure from possible Obesity Hypoventilation Syndrome  #Sleep apnea, undiagnosed  #Pulmonary nodule, Right, 3mm, Never smoker    BMI 49  BNPep 937, Trops  7.34/75/55/HCO3 35  NEGATIVE Flu A, Flu B, COVID, RSV tests  Cardiomegaly on imaging  Echo with reduced EF 35-40%, diastolic dysfunction  CTPE excludes PE; shows lung nodule  Received Lasix with net negative 3L    Recommendations:  - Agree with aggressive diuresis; Will need to monitor Serum bicarb levels with ongoing diuresis; Will recommend to keep bicarb levels  38-40; Agree with PRN Acetazolamide  - Reduce steroids/Short course therapy  - No plans to start Non-Invasive ventilation at this time. Will re-evaluate daily; Will plan for out-patient Sleep study and PAP titration  - Will start LABA/ICS (Symbicort) Inhaler on discharge  - Pulmonary outpatient follow up after disscharge      Patient seen and staffed with Dr Hill.   Thank you for the consult. Pulmonary team will continue to follow.      Luis Bowling MD  Fellow  Pulmonary & Critical Care

## 2025-01-18 NOTE — CARE PLAN
Problem: Skin  Goal: Decreased wound size/increased tissue granulation at next dressing change  Outcome: Progressing  Goal: Participates in plan/prevention/treatment measures  Outcome: Progressing  Goal: Prevent/manage excess moisture  Outcome: Progressing  Goal: Prevent/minimize sheer/friction injuries  Outcome: Progressing  Goal: Promote/optimize nutrition  Outcome: Progressing  Goal: Promote skin healing  Outcome: Progressing     Problem: Safety - Adult  Goal: Free from fall injury  Outcome: Progressing     Problem: Discharge Planning  Goal: Discharge to home or other facility with appropriate resources  Outcome: Progressing     Problem: Chronic Conditions and Co-morbidities  Goal: Patient's chronic conditions and co-morbidity symptoms are monitored and maintained or improved  Outcome: Progressing     The patient's goals for the shift include Safety    The clinical goals for the shift include Patient will Continue being educated on HFand DM    Over the shift, patient was educated on morning medications. Patient was free of SOB throughout shift and continued diuresing.

## 2025-01-18 NOTE — SIGNIFICANT EVENT
Rapid Response Nurse Note:  [x] RADAR alert/Score 6    Pager time:   Arrival time:   Event end time:   Location: LT [id number]  [x] Phone triage     Rapid response initiated by:  [] Rapid Response RN [] Family [] Nursing Supervisor [] Physician   [x] RADAR auto-page [] Sepsis auto-page [] RN [] RT   [] NP/PA [] Other:     Primary reason for call:   [] BAT [] New CPAP/BiPAP [] Bleeding [] Change in mental status   [] Chest pain [] Code blue [] FiO2 >/= 50% [] HR </= 40 bpm   [] HR >/= 130 bpm [] Hyperglycemia [] Hypoglycemia [x] RADAR    [] RR </= 8 bpm [] RR >/= 30 bpm [] SBP </= 90 mmHg [] SpO2 < 90%   [] Seizure [] Sepsis [] Staff concern:     Initial VS and/or RADAR VS:  radar vitals below in bold    Vitals:    25 1242 25 1535 25 1818 25   BP: 106/70 115/73  117/77   BP Location: Left arm   Right arm   Patient Position: Sitting   Lying   Pulse: 96 101  (!) 111   Resp: 18 18  20   Temp: 36.2 °C (97.2 °F) 35.6 °C (96.1 °F)  36.5 °C (97.7 °F)   TempSrc: Temporal   Temporal   SpO2: 92% 94%  92%   Weight:   115 kg (254 lb 3.1 oz)    Height:            Interventions:  [x] None [] ABG [] Assist w/ICU transfer [] BAT paged    [] Bag mask [] Blood [] Cardioversion [] Code Blue   [] Code blue for intubation [] Code status changed [] Chest x-ray [] EKG   [] IV fluid/bolus [] KUB x-ray [] Labs/cultures [] Medication   [] Nebulizer treatment [] NIPPV (CPAP/BiPAP) [] Oxygen [] Oral airway   [] Peripheral IV [] Palliative care consult [] CT/MRI [] Sepsis protocol    [] Suctioned [] Other:       Outcome:  [] Coded and  [] Code blue for intubation [] Coded and transferred to ICU []  on division   [x] Remained on division (no change) [] Remained on division + additional monitoring [] Remained in ED [] Transferred to ED   [] Transferred to ICU [] Transferred to inpatient status [] Transferred for interventions (procedure) [] Transferred to ICU stepdown    [] Transferred to surgery []  Transferred to telemetry [] Sepsis protocol [] STEMI protocol   [] Stroke protocol [x] Bedside nurse instructed to page rapid response for any concerns or acute change in condition/VS     Additional Comments: Spoke to RN regarding vital signs.  No concerns from RN at this time.

## 2025-01-19 VITALS
OXYGEN SATURATION: 95 % | TEMPERATURE: 97.7 F | BODY MASS INDEX: 47.2 KG/M2 | HEART RATE: 94 BPM | SYSTOLIC BLOOD PRESSURE: 104 MMHG | WEIGHT: 234.13 LBS | HEIGHT: 59 IN | RESPIRATION RATE: 20 BRPM | DIASTOLIC BLOOD PRESSURE: 61 MMHG

## 2025-01-19 LAB
ALBUMIN SERPL BCP-MCNC: 3.6 G/DL (ref 3.4–5)
ALBUMIN SERPL BCP-MCNC: 4.4 G/DL (ref 3.4–5)
ANION GAP SERPL CALC-SCNC: 12 MMOL/L (ref 10–20)
ANION GAP SERPL CALC-SCNC: 14 MMOL/L (ref 10–20)
BASOPHILS # BLD AUTO: 0.04 X10*3/UL (ref 0–0.1)
BASOPHILS NFR BLD AUTO: 0.3 %
BUN SERPL-MCNC: 22 MG/DL (ref 6–23)
BUN SERPL-MCNC: 24 MG/DL (ref 6–23)
CALCIUM SERPL-MCNC: 10 MG/DL (ref 8.6–10.6)
CALCIUM SERPL-MCNC: 9.2 MG/DL (ref 8.6–10.6)
CHLORIDE SERPL-SCNC: 96 MMOL/L (ref 98–107)
CHLORIDE SERPL-SCNC: 98 MMOL/L (ref 98–107)
CO2 SERPL-SCNC: 35 MMOL/L (ref 21–32)
CO2 SERPL-SCNC: 35 MMOL/L (ref 21–32)
CREAT SERPL-MCNC: 1.11 MG/DL (ref 0.5–1.05)
CREAT SERPL-MCNC: 1.22 MG/DL (ref 0.5–1.05)
EGFRCR SERPLBLD CKD-EPI 2021: 55 ML/MIN/1.73M*2
EGFRCR SERPLBLD CKD-EPI 2021: 61 ML/MIN/1.73M*2
EOSINOPHIL # BLD AUTO: 0.14 X10*3/UL (ref 0–0.7)
EOSINOPHIL NFR BLD AUTO: 1.1 %
ERYTHROCYTE [DISTWIDTH] IN BLOOD BY AUTOMATED COUNT: 13.4 % (ref 11.5–14.5)
GLUCOSE BLD MANUAL STRIP-MCNC: 171 MG/DL (ref 74–99)
GLUCOSE BLD MANUAL STRIP-MCNC: 195 MG/DL (ref 74–99)
GLUCOSE BLD MANUAL STRIP-MCNC: 204 MG/DL (ref 74–99)
GLUCOSE BLD MANUAL STRIP-MCNC: 254 MG/DL (ref 74–99)
GLUCOSE BLD MANUAL STRIP-MCNC: 258 MG/DL (ref 74–99)
GLUCOSE SERPL-MCNC: 206 MG/DL (ref 74–99)
GLUCOSE SERPL-MCNC: 251 MG/DL (ref 74–99)
HCT VFR BLD AUTO: 51.6 % (ref 36–46)
HGB BLD-MCNC: 15.7 G/DL (ref 12–16)
IMM GRANULOCYTES # BLD AUTO: 0.07 X10*3/UL (ref 0–0.7)
IMM GRANULOCYTES NFR BLD AUTO: 0.6 % (ref 0–0.9)
LYMPHOCYTES # BLD AUTO: 2.67 X10*3/UL (ref 1.2–4.8)
LYMPHOCYTES NFR BLD AUTO: 21.8 %
MAGNESIUM SERPL-MCNC: 2.58 MG/DL (ref 1.6–2.4)
MAGNESIUM SERPL-MCNC: 2.81 MG/DL (ref 1.6–2.4)
MCH RBC QN AUTO: 27 PG (ref 26–34)
MCHC RBC AUTO-ENTMCNC: 30.4 G/DL (ref 32–36)
MCV RBC AUTO: 89 FL (ref 80–100)
MONOCYTES # BLD AUTO: 1.16 X10*3/UL (ref 0.1–1)
MONOCYTES NFR BLD AUTO: 9.5 %
NEUTROPHILS # BLD AUTO: 8.19 X10*3/UL (ref 1.2–7.7)
NEUTROPHILS NFR BLD AUTO: 66.7 %
NRBC BLD-RTO: 0 /100 WBCS (ref 0–0)
PHOSPHATE SERPL-MCNC: 4.4 MG/DL (ref 2.5–4.9)
PHOSPHATE SERPL-MCNC: 5.3 MG/DL (ref 2.5–4.9)
PLATELET # BLD AUTO: 330 X10*3/UL (ref 150–450)
POTASSIUM SERPL-SCNC: 4 MMOL/L (ref 3.5–5.3)
POTASSIUM SERPL-SCNC: 4.5 MMOL/L (ref 3.5–5.3)
RBC # BLD AUTO: 5.82 X10*6/UL (ref 4–5.2)
SODIUM SERPL-SCNC: 140 MMOL/L (ref 136–145)
SODIUM SERPL-SCNC: 141 MMOL/L (ref 136–145)
WBC # BLD AUTO: 12.3 X10*3/UL (ref 4.4–11.3)

## 2025-01-19 PROCEDURE — 2500000002 HC RX 250 W HCPCS SELF ADMINISTERED DRUGS (ALT 637 FOR MEDICARE OP, ALT 636 FOR OP/ED)

## 2025-01-19 PROCEDURE — 36415 COLL VENOUS BLD VENIPUNCTURE: CPT

## 2025-01-19 PROCEDURE — 2500000004 HC RX 250 GENERAL PHARMACY W/ HCPCS (ALT 636 FOR OP/ED)

## 2025-01-19 PROCEDURE — 82947 ASSAY GLUCOSE BLOOD QUANT: CPT

## 2025-01-19 PROCEDURE — 2500000005 HC RX 250 GENERAL PHARMACY W/O HCPCS

## 2025-01-19 PROCEDURE — 99233 SBSQ HOSP IP/OBS HIGH 50: CPT

## 2025-01-19 PROCEDURE — 86334 IMMUNOFIX E-PHORESIS SERUM: CPT

## 2025-01-19 PROCEDURE — 1200000002 HC GENERAL ROOM WITH TELEMETRY DAILY

## 2025-01-19 PROCEDURE — 80069 RENAL FUNCTION PANEL: CPT

## 2025-01-19 PROCEDURE — 83735 ASSAY OF MAGNESIUM: CPT

## 2025-01-19 PROCEDURE — 2500000001 HC RX 250 WO HCPCS SELF ADMINISTERED DRUGS (ALT 637 FOR MEDICARE OP)

## 2025-01-19 PROCEDURE — 85025 COMPLETE CBC W/AUTO DIFF WBC: CPT

## 2025-01-19 RX ORDER — INSULIN GLARGINE 100 [IU]/ML
7 INJECTION, SOLUTION SUBCUTANEOUS NIGHTLY
Status: DISCONTINUED | OUTPATIENT
Start: 2025-01-19 | End: 2025-01-22

## 2025-01-19 RX ADMIN — EMPAGLIFLOZIN 10 MG: 10 TABLET, FILM COATED ORAL at 09:40

## 2025-01-19 RX ADMIN — CIPROFLOXACIN 2 DROP: 3 SOLUTION OPHTHALMIC at 04:57

## 2025-01-19 RX ADMIN — INSULIN GLARGINE 7 UNITS: 100 INJECTION, SOLUTION SUBCUTANEOUS at 21:00

## 2025-01-19 RX ADMIN — INSULIN LISPRO 1 UNITS: 100 INJECTION, SOLUTION INTRAVENOUS; SUBCUTANEOUS at 11:01

## 2025-01-19 RX ADMIN — FUROSEMIDE 80 MG: 10 INJECTION, SOLUTION INTRAMUSCULAR; INTRAVENOUS at 09:40

## 2025-01-19 RX ADMIN — CIPROFLOXACIN 1 DROP: 3 SOLUTION OPHTHALMIC at 18:34

## 2025-01-19 RX ADMIN — CIPROFLOXACIN 2 DROP: 3 SOLUTION OPHTHALMIC at 06:39

## 2025-01-19 RX ADMIN — CIPROFLOXACIN 1 DROP: 3 SOLUTION OPHTHALMIC at 23:20

## 2025-01-19 RX ADMIN — ENOXAPARIN SODIUM 40 MG: 100 INJECTION SUBCUTANEOUS at 09:40

## 2025-01-19 RX ADMIN — ENOXAPARIN SODIUM 40 MG: 100 INJECTION SUBCUTANEOUS at 21:00

## 2025-01-19 RX ADMIN — INSULIN LISPRO 1 UNITS: 100 INJECTION, SOLUTION INTRAVENOUS; SUBCUTANEOUS at 14:13

## 2025-01-19 RX ADMIN — MONTELUKAST 10 MG: 10 TABLET, FILM COATED ORAL at 21:00

## 2025-01-19 RX ADMIN — SACUBITRIL AND VALSARTAN 2 TABLET: 24; 26 TABLET, FILM COATED ORAL at 21:00

## 2025-01-19 RX ADMIN — CIPROFLOXACIN 1 DROP: 3 SOLUTION OPHTHALMIC at 14:13

## 2025-01-19 RX ADMIN — ATORVASTATIN CALCIUM 40 MG: 40 TABLET, FILM COATED ORAL at 21:00

## 2025-01-19 RX ADMIN — CARVEDILOL 6.25 MG: 6.25 TABLET, FILM COATED ORAL at 09:40

## 2025-01-19 RX ADMIN — INSULIN LISPRO 3 UNITS: 100 INJECTION, SOLUTION INTRAVENOUS; SUBCUTANEOUS at 18:35

## 2025-01-19 RX ADMIN — PREDNISONE 10 MG: 5 TABLET ORAL at 09:40

## 2025-01-19 RX ADMIN — ACETAZOLAMIDE 500 MG: 500 INJECTION, POWDER, LYOPHILIZED, FOR SOLUTION INTRAVENOUS at 09:41

## 2025-01-19 RX ADMIN — Medication 6 MG: at 21:00

## 2025-01-19 RX ADMIN — SACUBITRIL AND VALSARTAN 2 TABLET: 24; 26 TABLET, FILM COATED ORAL at 09:40

## 2025-01-19 RX ADMIN — SPIRONOLACTONE 25 MG: 25 TABLET, FILM COATED ORAL at 09:40

## 2025-01-19 RX ADMIN — CIPROFLOXACIN 2 DROP: 3 SOLUTION OPHTHALMIC at 09:40

## 2025-01-19 RX ADMIN — CARVEDILOL 6.25 MG: 6.25 TABLET, FILM COATED ORAL at 21:00

## 2025-01-19 ASSESSMENT — PAIN SCALES - GENERAL
PAINLEVEL_OUTOF10: 0 - NO PAIN
PAINLEVEL_OUTOF10: 0 - NO PAIN

## 2025-01-19 ASSESSMENT — COGNITIVE AND FUNCTIONAL STATUS - GENERAL
DAILY ACTIVITIY SCORE: 24
MOBILITY SCORE: 24

## 2025-01-19 ASSESSMENT — PAIN - FUNCTIONAL ASSESSMENT
PAIN_FUNCTIONAL_ASSESSMENT: 0-10
PAIN_FUNCTIONAL_ASSESSMENT: 0-10

## 2025-01-19 NOTE — HOSPITAL COURSE
Ms. Park is a 48 y/o female with past medical h/o uncontrolled HTN, asthma, and depression admitted on 1/16/25 as transfer from [facility] ED for acute on chronic dyspnea concerning for new diagnosis of acute decompensated heart failure with reduced EF. Initial vitals notable for sinus tachycardia with rates to 120s. She was hypertensive to 180s systolic, 120 diastolic. Labs notable for bicarb 35, . She additionally had a leukocytosis of 18.9 which given lack of infectious signs/symptoms was attributed to her being on steroids for possible asthma exacerbation. Trop -ve x2 (43-->44). EKG showed right axis deviation but no ischemic changes. CTPE showed no PE but did note severe cardiomegaly with large pericardial effusion and reflux of contrast into distended hepatic veins suggesting elevated right heart pressures. TTE noted only a small pericardial effusion which was reassuring, also showed LV EF 35-40% and moderately dilated LA. She was given x1 duonebs and admitted to medicine for further workup and management. Started entresto 12/13 mg BID, carvedilol 6.25 mg BID, atorvastatin 40 mg qhs, montelukast, scheduled albuterol nebs, prednisone taper. Spot dosing with IV lasix for goal net -ve 1-2 L. Her hemoglobin A1c is 8.2% for which she was started on lantus 6 units (first dose will be tonight 1/17) and SSI. On 1/17-1/19, patient started on Lasix 80mg BID with Diamox 500mg daily (3 days) which helped to improve her overall clinical status and volume overload. Cardiac MRI showed ***.               To-Do:  -LABA/ICS (Symbicort) Inhaler on discharge   -Pulmonary outpatient follow up after disscharge

## 2025-01-19 NOTE — CARE PLAN
The patient's goals for the shift include Safety    The clinical goals for the shift include safety    Over the shift, the patient did make progress toward the following goals.     Problem: Safety - Adult  Goal: Free from fall injury  Outcome: Progressing  Flowsheets (Taken 1/18/2025 2111)  Free from fall injury:   Instruct family/caregiver on patient safety   Based on caregiver fall risk screen, instruct family/caregiver to ask for assistance with transferring infant if caregiver noted to have fall risk factors     Problem: Chronic Conditions and Co-morbidities  Goal: Patient's chronic conditions and co-morbidity symptoms are monitored and maintained or improved  Outcome: Progressing  Flowsheets (Taken 1/18/2025 2111)  Care Plan - Patient's Chronic Conditions and Co-Morbidity Symptoms are Monitored and Maintained or Improved: Monitor and assess patient's chronic conditions and comorbid symptoms for stability, deterioration, or improvement

## 2025-01-19 NOTE — CARE PLAN
The patient's goals for the shift include Safety    The clinical goals for the shift include Patient will report decreased shortness of breath by end of shift    Patient remained safe and free of falls during shift. No reports of pain during shift. Patient did not report any shortness of breath during shift. Patient continues to receive IV Lasix.

## 2025-01-19 NOTE — PROGRESS NOTES
Samina Park is a 49 y.o. female on day 3 of admission presenting with Anasarca.      Subjective   Patient seen and examined today. She reports significant improvement in her symptoms since her admission.  Denies any headaches, confusion or loss of consciousness.       Objective     Last Recorded Vitals  BP 98/63 (BP Location: Right arm, Patient Position: Lying)   Pulse 58   Temp 36.4 °C (97.5 °F) (Temporal)   Resp 20   Wt 106 kg (234 lb 2.1 oz)   SpO2 94%   Intake/Output last 3 Shifts:    Intake/Output Summary (Last 24 hours) at 1/19/2025 0939  Last data filed at 1/19/2025 0650  Gross per 24 hour   Intake --   Output 5450 ml   Net -5450 ml       Admission Weight  Weight: 104 kg (229 lb 4.5 oz) (01/16/25 2000)    Daily Weight  01/19/25 : 106 kg (234 lb 2.1 oz)    Image Results  Electrocardiogram, 12-lead PRN ACS symptoms  Sinus tachycardia  Possible Left atrial enlargement  Low voltage QRS  Left posterior fascicular block  Abnormal ECG  When compared with ECG of 16-JAN-2025 04:57,  QRS duration has increased  ST no longer depressed in Anterior leads  T wave inversion no longer evident in Anterior leads  Confirmed by Ab Yee ([id number]) on 1/17/2025 4:44:39 PM      Physical Exam  Afebrile, anicteric  Atraumatic, Normocephalic  Chest: not tachypneic, PN resonant, reduced air entry, few scattered crackles, no wheeze  HS I and II present and normal, no murmurs  Abdomen full, non-tender  Bilateral pitting pedal edema up to knee  Conscious, alert, oriented in time, place and person    Relevant Results      Scheduled medications  atorvastatin, 40 mg, oral, Nightly  carvedilol, 6.25 mg, oral, BID  ciprofloxacin, 1 drop, Left Eye, q4h  empagliflozin, 10 mg, oral, Daily  enoxaparin, 40 mg, subcutaneous, q12h BALA  furosemide, 80 mg, intravenous, Daily  insulin glargine, 7 Units, subcutaneous, Nightly  insulin lispro, 0-5 Units, subcutaneous, TID AC  melatonin, 6 mg, oral, Daily  montelukast, 10 mg, oral, Nightly  oxygen,  , inhalation, Continuous - Inhalation  predniSONE, 10 mg, oral, Daily   Followed by  [START ON 1/22/2025] predniSONE, 5 mg, oral, Daily  sacubitriL-valsartan, 2 tablet, oral, BID  spironolactone, 25 mg, oral, Daily      Continuous medications     PRN medications  PRN medications: acetaminophen, albuterol, dextrose, dextrose, glucagon, glucagon, hydrALAZINE    Results for orders placed or performed during the hospital encounter of 01/16/25 (from the past 24 hours)   POCT GLUCOSE   Result Value Ref Range    POCT Glucose 157 (H) 74 - 99 mg/dL   POCT GLUCOSE   Result Value Ref Range    POCT Glucose 258 (H) 74 - 99 mg/dL   CBC and Auto Differential   Result Value Ref Range    WBC 12.3 (H) 4.4 - 11.3 x10*3/uL    nRBC 0.0 0.0 - 0.0 /100 WBCs    RBC 5.82 (H) 4.00 - 5.20 x10*6/uL    Hemoglobin 15.7 12.0 - 16.0 g/dL    Hematocrit 51.6 (H) 36.0 - 46.0 %    MCV 89 80 - 100 fL    MCH 27.0 26.0 - 34.0 pg    MCHC 30.4 (L) 32.0 - 36.0 g/dL    RDW 13.4 11.5 - 14.5 %    Platelets 330 150 - 450 x10*3/uL    Neutrophils % 66.7 40.0 - 80.0 %    Immature Granulocytes %, Automated 0.6 0.0 - 0.9 %    Lymphocytes % 21.8 13.0 - 44.0 %    Monocytes % 9.5 2.0 - 10.0 %    Eosinophils % 1.1 0.0 - 6.0 %    Basophils % 0.3 0.0 - 2.0 %    Neutrophils Absolute 8.19 (H) 1.20 - 7.70 x10*3/uL    Immature Granulocytes Absolute, Automated 0.07 0.00 - 0.70 x10*3/uL    Lymphocytes Absolute 2.67 1.20 - 4.80 x10*3/uL    Monocytes Absolute 1.16 (H) 0.10 - 1.00 x10*3/uL    Eosinophils Absolute 0.14 0.00 - 0.70 x10*3/uL    Basophils Absolute 0.04 0.00 - 0.10 x10*3/uL   Magnesium   Result Value Ref Range    Magnesium 2.58 (H) 1.60 - 2.40 mg/dL   Renal Function Panel   Result Value Ref Range    Glucose 251 (H) 74 - 99 mg/dL    Sodium 141 136 - 145 mmol/L    Potassium 4.0 3.5 - 5.3 mmol/L    Chloride 96 (L) 98 - 107 mmol/L    Bicarbonate 35 (H) 21 - 32 mmol/L    Anion Gap 14 10 - 20 mmol/L    Urea Nitrogen 22 6 - 23 mg/dL    Creatinine 1.11 (H) 0.50 - 1.05 mg/dL     eGFR 61 >60 mL/min/1.73m*2    Calcium 9.2 8.6 - 10.6 mg/dL    Phosphorus 5.3 (H) 2.5 - 4.9 mg/dL    Albumin 3.6 3.4 - 5.0 g/dL   POCT GLUCOSE   Result Value Ref Range    POCT Glucose 204 (H) 74 - 99 mg/dL   POCT GLUCOSE   Result Value Ref Range    POCT Glucose 171 (H) 74 - 99 mg/dL   POCT GLUCOSE   Result Value Ref Range    POCT Glucose 195 (H) 74 - 99 mg/dL             Assessment/Plan                  Assessment & Plan  Anasarca    Ms Park is a 49y.o. female, Never smoker, with PMHx of Childhood Asthma (no PFTs seen), HTN and Depression who presents to [facility] as a transfer from [facility] ED where she presented with a progressive worsening of shortness of breath over the past few months. Pulmonary consulted for Asthma and chronic hypercapnic respiratory failure management.     #Acute Decompensated Heart Failure  #Mild Intermittent Asthma  #Chronic hypercapnic Respiratory failure from possible Obesity Hypoventilation Syndrome  #Sleep apnea, undiagnosed  #Pulmonary nodule, Right, 3mm, Never smoker     BMI 49  BNPep 937, Trops  7.34/75/55/HCO3 35  NEGATIVE Flu A, Flu B, COVID, RSV tests  Cardiomegaly on imaging  Echo with reduced EF 35-40%, diastolic dysfunction  CTPE excludes PE; shows lung nodule  Received Lasix with good output     Recommendations:  - Agree with aggressive diuresis; Will need to monitor Serum bicarb levels with ongoing diuresis; Will recommend to keep bicarb levels 38-40; Agree with PRN Acetazolamide  - No plans to start Non-Invasive ventilation at this time.    - Will start LABA/ICS (Symbicort) Inhaler on discharge  - Will need an outpatient Sleep study and PAP titration; Please ensure pt has an appointment prior to discharge home  - Pulmonary outpatient follow up after disscharge     Patient seen and staffed with Dr Hill.   Thank you for the consult. Pulmonary team will continue to follow.        Luis Bowling MD  Fellow  Pulmonary & Critical Care

## 2025-01-19 NOTE — PROGRESS NOTES
"  Internal Medicine Progress Note     Samina Park is a 49 y.o. female with a past medical history of uncontrolled HTN and asthma admitted on 1/16/2025 with acute decompensated heart failure iso newly diagnosed HFrEF.     Subjective     Overnight: NAEON.     This morning, patient states she feels like her breathing is much improved with diuresis again today. She is currently on 2 L LFNC for comfort. She endorses this ongoing lower extremity edema is new for her prior to this admission but much improved this morning. No chest pain, shortness of breath, abdominal pain, head ache, or vision changes.        Medications     Medications:   Scheduled medications  acetazolamide, 500 mg, intravenous, Daily  atorvastatin, 40 mg, oral, Nightly  carvedilol, 6.25 mg, oral, BID  ciprofloxacin, 2 drop, Left Eye, q2h   Followed by  ciprofloxacin, 1 drop, Left Eye, q4h  empagliflozin, 10 mg, oral, Daily  enoxaparin, 40 mg, subcutaneous, q12h BALA  furosemide, 80 mg, intravenous, Daily  insulin glargine, 6 Units, subcutaneous, Nightly  insulin lispro, 0-5 Units, subcutaneous, TID AC  melatonin, 6 mg, oral, Daily  montelukast, 10 mg, oral, Nightly  oxygen, , inhalation, Continuous - Inhalation  predniSONE, 10 mg, oral, Daily   Followed by  [START ON 1/22/2025] predniSONE, 5 mg, oral, Daily  sacubitriL-valsartan, 2 tablet, oral, BID  spironolactone, 25 mg, oral, Daily      Continuous medications     PRN medications  PRN medications: acetaminophen, albuterol, dextrose, dextrose, glucagon, glucagon, hydrALAZINE     Objective     Vitals  Visit Vitals  /70   Pulse 58   Temp 36.4 °C (97.5 °F) (Temporal)   Resp 20   Ht 1.499 m (4' 11.02\")   Wt 106 kg (234 lb 2.1 oz)   SpO2 94%   BMI 47.26 kg/m²   Smoking Status Never   BSA 2.1 m²       Intake/Output Summary (Last 24 hours) at 1/19/2025 1025  Last data filed at 1/19/2025 1000  Gross per 24 hour   Intake --   Output 5800 ml   Net -5800 ml       Physical Exam  General: A&Ox3, NAD.  HEENT: " Normocephalic, atraumatic. EOMI. MMM. No cervical lymphadenopathy.   Respiratory: Decreased breaths sounds bilateral bases. Mixed end expiratory wheezes and crackles bilaterally. On 2L.  Cardiovascular: Sinus tachycardia. Distant heart sounds. No murmurs, gallops, or rubs.   Abdominal: Soft, distended due to body habitus, nontender. No rebound tenderness or involuntary guarding.   MSK: 2+ pitting edema bilateral LE.   Skin: Warm, dry. No rashes or wounds.  Neuro: No focal neuro deficits.  Psych: Appropriate mood and affect.    Labs  Lab Results   Component Value Date    WBC 12.3 (H) 01/19/2025    HGB 15.7 01/19/2025    HCT 51.6 (H) 01/19/2025    MCV 89 01/19/2025     01/19/2025      Lab Results   Component Value Date    GLUCOSE 251 (H) 01/19/2025    CALCIUM 9.2 01/19/2025     01/19/2025    K 4.0 01/19/2025    CO2 35 (H) 01/19/2025    CL 96 (L) 01/19/2025    BUN 22 01/19/2025    CREATININE 1.11 (H) 01/19/2025      Lab Results   Component Value Date    ALT 23 01/17/2025    AST 18 01/17/2025    ALKPHOS 71 01/17/2025    BILITOT 1.1 01/17/2025        Imaging  === 01/15/25 ===    CT ANGIO CHEST FOR PULMONARY EMBOLISM    - Impression -  1.  No pulmonary embolism identified.  2. Severe cardiomegaly with large pericardial effusion. Reflux of  contrast into the distended hepatic veins suggests elevated right  heart pressures.  3. 3 mm right lower lobe nodule. Mildly enlarged subcarinal lymph  nodes perhaps reactive. Consider follow-up CT chest in 1 year to  document stability.  4. Partially imaged upper abdominal ascites and anasarca.    TTE (01/15/25):  1. Poorly visualized anatomical structures due to suboptimal image quality.                           2. Left ventricular ejection fraction is moderately decreased, by visual estimate at 35-40%.   3. Spectral Doppler shows an abnormal pattern of left ventricular diastolic filling.   4. There is normal right ventricular global systolic function.   5. The left  atrium is moderately dilated.   6. There is a small pericardial effusion.       Assessment/Plan   Samina Park is a 49-year-old female with PMH significant for HTN, HLD, obesity, asthma, depression. Patient presented as a transfer from [facility] where she presented with acute on chronic dyspnea worsening over the last few months which she initially attributed to poorly controlled asthma. Treated with several courses of glucocorticoids with no improvement and had recently been taking prescribed by urgent care earlier this week. She was found to be in acute decompensated heart failure with new HFrEF based on TTE findings here at [facility]. Now undergoing aggressive diuresis with Lasix 80IV BID and a course of Diamox started (1/17-19) with improving clinical status.     Updates 1/19/25:  -Improving volume status  -Continue w/ diuresis (Lasix 80IV BID + last dose of diamox today)  -Continue w/ Entresto, Carvedilol, Jardiance, Koffi  -cMRI ordered for 1/20- now without orthopnea. NPO MN    #New ADHF, HFrEF NYHA Class III  #C/f pericardial effusion  #Sinus tachycardia  #Anasarca  #HTN  :: CT PE c/f large pericardial effusion, though noted as small on TTE  :: TTE on presentation showing EF 35-40%, abnormal LV diastolic filling, moderately dilated LA, small pericardial effusion   ::   :: TSH wnl   :: hemoglobin A1c 8.2%  :: UDS -ve  :: UA w/out massive proteinuria to suggest renal etiology   :: HIV:  non-reactive  Plan:  - Entresto 50 mg BID   - carvedilol 6.25 mg BID   - Jardiance 10 mg every day  - Spironolactone 25 mg every day   - Lasix 80 IV BID  - Acetazolamide 500 mg daily (1/17- last date today 1/19)  - Plan for cardiac MRI when able to lay flat - Ordered  - Could benefit from GLP-1, F/U outpatient    #HTN  #DLD  : :Tchol 155, HDL 27, LDL 92, Trig 183   : :hemogloboin A1c 8.2%  : :ASCVD score: 7.4% risk of cardiovascular event in next 10 years  Plan:  -Atorvastatin 40 mg daily     #Leukocytosis - stable  ::  likely due to outpatient steroid course for misdiagnosis of asthma exacerbation   :: not meeting SIRS criteria, no possible source  Plan:  -prednisone taper  -continue to trend WBC  -monitor for infectious signs/symptoms    #Asthma  #Chronic Hypercapnic RF  #c/f OHS and/or DARELL  #Pulmonary Nodule Right 3mm, Never smoker  :: home meds- albuterol inhaler and nebs PRN  :: VBG showing CO2 75, likely has retention at baseline  :: elevated bicarb likely compensation   :: unable to see PFTs in EMR  Plan:  - continue albuterol nebs for now  - Goal Bicarb 38-40 per Pulm iso diuresis  - No plans for CPAP/BiPAP while inpatient   - outpatient Pulm: sleep study & PFTs   - discharge with LABA/ICS prn    #Depression  -continue home cymbalta     F: Avoiding in ADHF   E: PRN,    N: Cardiac (NPO MN)  A: pIV  DVT prophylaxis: Lovenox  Code status: Full code confirmed on admission  NOK: HARMEET Jenkins [phone]   ---  Ronn Ang MD PhD   Internal Medicine/Pediatrics PGY-1

## 2025-01-20 ENCOUNTER — APPOINTMENT (OUTPATIENT)
Dept: RADIOLOGY | Facility: HOSPITAL | Age: 50
End: 2025-01-20

## 2025-01-20 PROBLEM — I27.22: Status: ACTIVE | Noted: 2025-01-20

## 2025-01-20 PROBLEM — I27.23: Status: ACTIVE | Noted: 2025-01-20

## 2025-01-20 PROBLEM — I50.20 HFREF (HEART FAILURE WITH REDUCED EJECTION FRACTION): Status: ACTIVE | Noted: 2025-01-20

## 2025-01-20 PROBLEM — R29.818 SUSPECTED SLEEP APNEA: Status: ACTIVE | Noted: 2025-01-20

## 2025-01-20 PROBLEM — E66.2 OBESITY HYPOVENTILATION SYNDROME (MULTI): Status: ACTIVE | Noted: 2025-01-20

## 2025-01-20 PROBLEM — E87.70 VOLUME OVERLOAD: Status: ACTIVE | Noted: 2025-01-20

## 2025-01-20 LAB
ALBUMIN SERPL BCP-MCNC: 4.2 G/DL (ref 3.4–5)
ALBUMIN SERPL BCP-MCNC: 4.4 G/DL (ref 3.4–5)
ANION GAP SERPL CALC-SCNC: 12 MMOL/L (ref 10–20)
ANION GAP SERPL CALC-SCNC: 16 MMOL/L (ref 10–20)
BASOPHILS # BLD AUTO: 0.05 X10*3/UL (ref 0–0.1)
BASOPHILS NFR BLD AUTO: 0.5 %
BUN SERPL-MCNC: 22 MG/DL (ref 6–23)
BUN SERPL-MCNC: 26 MG/DL (ref 6–23)
CALCIUM SERPL-MCNC: 10.3 MG/DL (ref 8.6–10.6)
CALCIUM SERPL-MCNC: 10.7 MG/DL (ref 8.6–10.6)
CHLORIDE SERPL-SCNC: 100 MMOL/L (ref 98–107)
CHLORIDE SERPL-SCNC: 94 MMOL/L (ref 98–107)
CO2 SERPL-SCNC: 33 MMOL/L (ref 21–32)
CO2 SERPL-SCNC: 33 MMOL/L (ref 21–32)
CREAT SERPL-MCNC: 1.04 MG/DL (ref 0.5–1.05)
CREAT SERPL-MCNC: 1.21 MG/DL (ref 0.5–1.05)
EGFRCR SERPLBLD CKD-EPI 2021: 55 ML/MIN/1.73M*2
EGFRCR SERPLBLD CKD-EPI 2021: 66 ML/MIN/1.73M*2
EOSINOPHIL # BLD AUTO: 0.16 X10*3/UL (ref 0–0.7)
EOSINOPHIL NFR BLD AUTO: 1.5 %
ERYTHROCYTE [DISTWIDTH] IN BLOOD BY AUTOMATED COUNT: 13.4 % (ref 11.5–14.5)
GLUCOSE BLD MANUAL STRIP-MCNC: 145 MG/DL (ref 74–99)
GLUCOSE BLD MANUAL STRIP-MCNC: 182 MG/DL (ref 74–99)
GLUCOSE BLD MANUAL STRIP-MCNC: 183 MG/DL (ref 74–99)
GLUCOSE BLD MANUAL STRIP-MCNC: 213 MG/DL (ref 74–99)
GLUCOSE BLD MANUAL STRIP-MCNC: 272 MG/DL (ref 74–99)
GLUCOSE SERPL-MCNC: 176 MG/DL (ref 74–99)
GLUCOSE SERPL-MCNC: 200 MG/DL (ref 74–99)
HCT VFR BLD AUTO: 54.7 % (ref 36–46)
HGB BLD-MCNC: 16.3 G/DL (ref 12–16)
IMM GRANULOCYTES # BLD AUTO: 0.05 X10*3/UL (ref 0–0.7)
IMM GRANULOCYTES NFR BLD AUTO: 0.5 % (ref 0–0.9)
LYMPHOCYTES # BLD AUTO: 2.67 X10*3/UL (ref 1.2–4.8)
LYMPHOCYTES NFR BLD AUTO: 24.6 %
MAGNESIUM SERPL-MCNC: 2.7 MG/DL (ref 1.6–2.4)
MAGNESIUM SERPL-MCNC: 2.78 MG/DL (ref 1.6–2.4)
MCH RBC QN AUTO: 26.9 PG (ref 26–34)
MCHC RBC AUTO-ENTMCNC: 29.8 G/DL (ref 32–36)
MCV RBC AUTO: 90 FL (ref 80–100)
MONOCYTES # BLD AUTO: 0.99 X10*3/UL (ref 0.1–1)
MONOCYTES NFR BLD AUTO: 9.1 %
NEUTROPHILS # BLD AUTO: 6.95 X10*3/UL (ref 1.2–7.7)
NEUTROPHILS NFR BLD AUTO: 63.8 %
NRBC BLD-RTO: 0 /100 WBCS (ref 0–0)
PHOSPHATE SERPL-MCNC: 5 MG/DL (ref 2.5–4.9)
PHOSPHATE SERPL-MCNC: 5.1 MG/DL (ref 2.5–4.9)
PLATELET # BLD AUTO: 318 X10*3/UL (ref 150–450)
POTASSIUM SERPL-SCNC: 3.9 MMOL/L (ref 3.5–5.3)
POTASSIUM SERPL-SCNC: 4.2 MMOL/L (ref 3.5–5.3)
RBC # BLD AUTO: 6.06 X10*6/UL (ref 4–5.2)
SODIUM SERPL-SCNC: 139 MMOL/L (ref 136–145)
SODIUM SERPL-SCNC: 141 MMOL/L (ref 136–145)
WBC # BLD AUTO: 10.9 X10*3/UL (ref 4.4–11.3)

## 2025-01-20 PROCEDURE — 82947 ASSAY GLUCOSE BLOOD QUANT: CPT

## 2025-01-20 PROCEDURE — 85025 COMPLETE CBC W/AUTO DIFF WBC: CPT

## 2025-01-20 PROCEDURE — 2500000004 HC RX 250 GENERAL PHARMACY W/ HCPCS (ALT 636 FOR OP/ED)

## 2025-01-20 PROCEDURE — 75565 CARD MRI VELOC FLOW MAPPING: CPT | Performed by: INTERNAL MEDICINE

## 2025-01-20 PROCEDURE — 80069 RENAL FUNCTION PANEL: CPT

## 2025-01-20 PROCEDURE — A9575 INJ GADOTERATE MEGLUMI 0.1ML: HCPCS

## 2025-01-20 PROCEDURE — 2500000001 HC RX 250 WO HCPCS SELF ADMINISTERED DRUGS (ALT 637 FOR MEDICARE OP)

## 2025-01-20 PROCEDURE — 75561 CARDIAC MRI FOR MORPH W/DYE: CPT

## 2025-01-20 PROCEDURE — 75561 CARDIAC MRI FOR MORPH W/DYE: CPT | Performed by: INTERNAL MEDICINE

## 2025-01-20 PROCEDURE — 99232 SBSQ HOSP IP/OBS MODERATE 35: CPT | Performed by: STUDENT IN AN ORGANIZED HEALTH CARE EDUCATION/TRAINING PROGRAM

## 2025-01-20 PROCEDURE — 2550000001 HC RX 255 CONTRASTS

## 2025-01-20 PROCEDURE — 2500000002 HC RX 250 W HCPCS SELF ADMINISTERED DRUGS (ALT 637 FOR MEDICARE OP, ALT 636 FOR OP/ED)

## 2025-01-20 PROCEDURE — 99232 SBSQ HOSP IP/OBS MODERATE 35: CPT | Performed by: INTERNAL MEDICINE

## 2025-01-20 PROCEDURE — 1200000002 HC GENERAL ROOM WITH TELEMETRY DAILY

## 2025-01-20 PROCEDURE — 36415 COLL VENOUS BLD VENIPUNCTURE: CPT

## 2025-01-20 PROCEDURE — 97165 OT EVAL LOW COMPLEX 30 MIN: CPT | Mod: GO

## 2025-01-20 PROCEDURE — 83735 ASSAY OF MAGNESIUM: CPT

## 2025-01-20 PROCEDURE — 2500000005 HC RX 250 GENERAL PHARMACY W/O HCPCS

## 2025-01-20 RX ORDER — FUROSEMIDE 10 MG/ML
40 INJECTION INTRAMUSCULAR; INTRAVENOUS ONCE
Status: COMPLETED | OUTPATIENT
Start: 2025-01-20 | End: 2025-01-20

## 2025-01-20 RX ORDER — FUROSEMIDE 10 MG/ML
40 INJECTION INTRAMUSCULAR; INTRAVENOUS ONCE
Status: DISCONTINUED | OUTPATIENT
Start: 2025-01-20 | End: 2025-01-20

## 2025-01-20 RX ORDER — INSULIN GLARGINE 100 [IU]/ML
7 INJECTION, SOLUTION SUBCUTANEOUS NIGHTLY
Start: 2025-01-20 | End: 2025-01-22 | Stop reason: HOSPADM

## 2025-01-20 RX ORDER — SPIRONOLACTONE 25 MG/1
25 TABLET ORAL DAILY
Qty: 30 TABLET | Refills: 11 | Status: SHIPPED | OUTPATIENT
Start: 2025-01-20 | End: 2025-01-22

## 2025-01-20 RX ORDER — POTASSIUM CHLORIDE 750 MG/1
10 TABLET, FILM COATED, EXTENDED RELEASE ORAL ONCE
Status: COMPLETED | OUTPATIENT
Start: 2025-01-20 | End: 2025-01-20

## 2025-01-20 RX ORDER — FUROSEMIDE 10 MG/ML
40 INJECTION INTRAMUSCULAR; INTRAVENOUS ONCE
Status: DISCONTINUED | OUTPATIENT
Start: 2025-01-20 | End: 2025-01-21

## 2025-01-20 RX ORDER — GADOTERATE MEGLUMINE 376.9 MG/ML
40 INJECTION INTRAVENOUS
Status: COMPLETED | OUTPATIENT
Start: 2025-01-20 | End: 2025-01-20

## 2025-01-20 RX ORDER — FLUTICASONE PROPIONATE AND SALMETEROL 100; 50 UG/1; UG/1
1 POWDER RESPIRATORY (INHALATION)
Qty: 60 EACH | Refills: 0 | Status: SHIPPED | OUTPATIENT
Start: 2025-01-20 | End: 2025-01-22

## 2025-01-20 RX ORDER — MONTELUKAST SODIUM 10 MG/1
10 TABLET ORAL NIGHTLY
Start: 2025-01-20 | End: 2025-01-22

## 2025-01-20 RX ORDER — ATORVASTATIN CALCIUM 40 MG/1
40 TABLET, FILM COATED ORAL NIGHTLY
Qty: 30 TABLET | Refills: 11 | Status: SHIPPED | OUTPATIENT
Start: 2025-01-20 | End: 2025-01-22

## 2025-01-20 RX ORDER — CARVEDILOL 6.25 MG/1
6.25 TABLET ORAL 2 TIMES DAILY
Start: 2025-01-20 | End: 2025-01-22

## 2025-01-20 RX ORDER — SACUBITRIL AND VALSARTAN 49; 51 MG/1; MG/1
1 TABLET, FILM COATED ORAL 2 TIMES DAILY
Qty: 60 TABLET | Refills: 11 | Status: SHIPPED | OUTPATIENT
Start: 2025-01-20 | End: 2025-01-22

## 2025-01-20 RX ADMIN — CIPROFLOXACIN 1 DROP: 3 SOLUTION OPHTHALMIC at 19:59

## 2025-01-20 RX ADMIN — SACUBITRIL AND VALSARTAN 2 TABLET: 24; 26 TABLET, FILM COATED ORAL at 09:10

## 2025-01-20 RX ADMIN — INSULIN GLARGINE 7 UNITS: 100 INJECTION, SOLUTION SUBCUTANEOUS at 21:04

## 2025-01-20 RX ADMIN — SACUBITRIL AND VALSARTAN 2 TABLET: 24; 26 TABLET, FILM COATED ORAL at 20:55

## 2025-01-20 RX ADMIN — CIPROFLOXACIN 1 DROP: 3 SOLUTION OPHTHALMIC at 10:07

## 2025-01-20 RX ADMIN — ATORVASTATIN CALCIUM 40 MG: 40 TABLET, FILM COATED ORAL at 20:55

## 2025-01-20 RX ADMIN — CARVEDILOL 6.25 MG: 6.25 TABLET, FILM COATED ORAL at 09:10

## 2025-01-20 RX ADMIN — PREDNISONE 10 MG: 5 TABLET ORAL at 09:10

## 2025-01-20 RX ADMIN — CIPROFLOXACIN 1 DROP: 3 SOLUTION OPHTHALMIC at 23:46

## 2025-01-20 RX ADMIN — Medication 2 L/MIN: at 09:20

## 2025-01-20 RX ADMIN — INSULIN LISPRO 1 UNITS: 100 INJECTION, SOLUTION INTRAVENOUS; SUBCUTANEOUS at 13:00

## 2025-01-20 RX ADMIN — CIPROFLOXACIN 1 DROP: 3 SOLUTION OPHTHALMIC at 17:08

## 2025-01-20 RX ADMIN — ENOXAPARIN SODIUM 40 MG: 100 INJECTION SUBCUTANEOUS at 20:55

## 2025-01-20 RX ADMIN — ENOXAPARIN SODIUM 40 MG: 100 INJECTION SUBCUTANEOUS at 09:09

## 2025-01-20 RX ADMIN — INSULIN LISPRO 3 UNITS: 100 INJECTION, SOLUTION INTRAVENOUS; SUBCUTANEOUS at 17:46

## 2025-01-20 RX ADMIN — MONTELUKAST 10 MG: 10 TABLET, FILM COATED ORAL at 20:55

## 2025-01-20 RX ADMIN — CARVEDILOL 6.25 MG: 6.25 TABLET, FILM COATED ORAL at 20:55

## 2025-01-20 RX ADMIN — FUROSEMIDE 40 MG: 10 INJECTION, SOLUTION INTRAMUSCULAR; INTRAVENOUS at 17:07

## 2025-01-20 RX ADMIN — SPIRONOLACTONE 25 MG: 25 TABLET, FILM COATED ORAL at 09:10

## 2025-01-20 RX ADMIN — Medication 6 MG: at 20:57

## 2025-01-20 RX ADMIN — Medication 2 L/MIN: at 20:56

## 2025-01-20 RX ADMIN — GADOTERATE MEGLUMINE 40 ML: 376.9 INJECTION INTRAVENOUS at 11:56

## 2025-01-20 RX ADMIN — FUROSEMIDE 40 MG: 10 INJECTION, SOLUTION INTRAMUSCULAR; INTRAVENOUS at 12:16

## 2025-01-20 RX ADMIN — EMPAGLIFLOZIN 10 MG: 10 TABLET, FILM COATED ORAL at 09:09

## 2025-01-20 RX ADMIN — POTASSIUM CHLORIDE 10 MEQ: 750 TABLET, FILM COATED, EXTENDED RELEASE ORAL at 23:46

## 2025-01-20 ASSESSMENT — COGNITIVE AND FUNCTIONAL STATUS - GENERAL
MOBILITY SCORE: 24
PERSONAL GROOMING: A LITTLE
PERSONAL GROOMING: A LITTLE
TOILETING: A LITTLE
MOBILITY SCORE: 24
DAILY ACTIVITIY SCORE: 21
DRESSING REGULAR LOWER BODY CLOTHING: A LITTLE
HELP NEEDED FOR BATHING: A LITTLE
DAILY ACTIVITIY SCORE: 23
DAILY ACTIVITIY SCORE: 23

## 2025-01-20 ASSESSMENT — ACTIVITIES OF DAILY LIVING (ADL)
BATHING_ASSISTANCE: STAND BY
LACK_OF_TRANSPORTATION: NO

## 2025-01-20 ASSESSMENT — PAIN - FUNCTIONAL ASSESSMENT: PAIN_FUNCTIONAL_ASSESSMENT: 0-10

## 2025-01-20 ASSESSMENT — PAIN SCALES - GENERAL
PAINLEVEL_OUTOF10: 0 - NO PAIN
PAINLEVEL_OUTOF10: 0 - NO PAIN

## 2025-01-20 NOTE — PROGRESS NOTES
TCC met with patient at bedside for discharge assessment. Patient drives self to medical appts. Pharmacy- [facility], DME- [facility]. Does patient feel safe- Yes. Patient did apply for Medicaid during hospital admission. Current discharge recommendations- No needs. TCC will continue to follow for discharge planning.     01/20/25 1605   Discharge Planning   Living Arrangements Family members   Support Systems Family members   Assistance Needed TBD   Type of Residence Private residence   Number of Stairs to Enter Residence 3   Number of Stairs Within Residence 0   Do you have animals or pets at home? Yes   Type of Animals or Pets Dog (1) Cat (1)   Who is requesting discharge planning? Provider   Home or Post Acute Services None   Expected Discharge Disposition Home   Does the patient need discharge transport arranged? No   RoundTrip coordination needed? No   Financial Resource Strain   How hard is it for you to pay for the very basics like food, housing, medical care, and heating? Not hard   Housing Stability   In the last 12 months, was there a time when you were not able to pay the mortgage or rent on time? N   At any time in the past 12 months, were you homeless or living in a shelter (including now)? N   Transportation Needs   In the past 12 months, has lack of transportation kept you from medical appointments or from getting medications? no   In the past 12 months, has lack of transportation kept you from meetings, work, or from getting things needed for daily living? No     TAMMIE Ayala, RN  [facility], [facility]&9  Transitional Care Coordinator, Mon-Fri  Cell: [phone], Office: [phone]  Email: [email or web address]

## 2025-01-20 NOTE — SIGNIFICANT EVENT
Rapid Response Nurse Note: RADAR alert: 6    Pager time: [phone]  Arrival time: 435  Event end time: 440  Location: [facility]  [x] Triage by phone or secure messaging    Rapid response initiated by:  [] Rapid response RN [] Family [] Nursing Supervisor [] Physician   [x] RADAR auto page [] Sepsis auto-page [] RN [] RT   [] NP/PA [] Other:       Initial VS and/or RADAR VS: T 36.2 °C; HR 97; RR 20; BP 95/64; SPO2 90%      Interventions:  [x] None [] ABG/VBG [] Assist w/ICU transfer [] BAT paged    [] Bag mask [] Blood [] Cardioversion [] Code Blue   [] Code blue for intubation [] Code status changed [] Chest x-ray [] EKG   [] IV fluid/bolus [] KUB x-ray [] Labs/cultures [] Medication   [] Nebulizer treatment [] NIPPV (CPAP/BiPAP) [] Oxygen [] Oral airway   [] Peripheral IV [] Palliative care consult [] CT/MRI [] Sepsis protocol    [] Suctioned [] Other:     Outcome:  [] Coded and  [] Code blue for intubation [] Coded and transferred to ICU []  on division   [x] Remained on division (no change) [] Remained on division + additional monitoring [] Remained in ED [] Transferred to ED   [] Transferred to ICU [] Transferred to inpatient status [] Transferred for interventions (procedure) [] Transferred to ICU stepdown    [] Transferred to surgery [] Transferred to telemetry [] Sepsis protocol [] STEMI protocol   [] Stroke protocol [x] Bedside nurse instructed to page rapid response for any concerns or acute change in condition/VS     Additional Comments: Reviewed above RADAR VS with bedside RN via secure chat.  Vital signs within patient's current trends.  No acute change in condition.  No interventions by rapid response team indicated at this time.  Staff to page rapid response for any concerns or acute change in condition or VS.

## 2025-01-20 NOTE — PROGRESS NOTES
"PROGRESS NOTE    Subjective:  Doing better today, states her shortness of breath has significantly improved from admission    Review of Systems:  Constitutional: Denies fever, weight loss or night sweats.  Eyes: Denies blurring of vision or double vision.  ENT: Denies nasal discharge, ear pain or throat pain.  Respiratory: As noted in HPI.  Cardiovascular: Denies chest pain or palpitations.  Gastrointestinal: Denies abdominal pain, nausea, vomiting, diarrhea or constipation.  Neurological: Denies headache, confusion or lightheadedness.    All other systems have been reviewed and are negative.    Physical Examination:  /75 (BP Location: Left arm, Patient Position: Lying)   Pulse 89   Temp 36 °C (96.8 °F) (Temporal)   Resp 20   Ht 1.499 m (4' 11.02\")   Wt 103 kg (226 lb 13.7 oz)   SpO2 96%   BMI 45.79 kg/m²      General: Awake and alert. In no acute distress.   Eyes: PERRL. Clear sclera.  ENT: Neck supple. Mucus membranes moist.  Neck: Trachea midline. No JVD.   Respiratory: Equal air entry bilaterally. No added sounds. 2L NC.  Cardiovascular: Regular rate and rhythm. S1S2 heard.  Gastrointestinal: Soft, non-distended. Bowel sounds present.  Neurological: Awake and alert. No focal motor deficits.  Skin: Warm and dry. No rash.  Extremities: 1+ pedal edema.       Intake/Output Summary (Last 24 hours) at 1/20/2025 1033  Last data filed at 1/20/2025 0900  Gross per 24 hour   Intake --   Output 5300 ml   Net -5300 ml     Net IO Since Admission: -13,930 mL [01/20/25 1033]    Chest Radiograph:  XR chest 1 view 04/11/2022    Narrative  * * *Final Report* * *    DATE OF EXAM: Apr 11 2022  4:43AM    ASX   [id number]  -  XR CHEST 1V FRONTAL PORT  / ACCESSION #  [id number]    PROCEDURE REASON: Shortness of breath    * * * * Physician Interpretation * * * *    EXAMINATION:  CHEST RADIOGRAPH (PORTABLE SINGLE VIEW AP)    Exam Date/Time:  4/11/2022 4:43 AM  CLINICAL HISTORY: Shortness of breath, Tachycardia, Wheezing  MQ:  " XCPR_5  Comparison:  5/14/2020    RESULT:    Lines, tubes, and devices:  None.    Lungs and pleura:  No consolidation. Subsegmental left lung peripheral  atelectasis. No pleural effusion or pneumothorax    Cardiomediastinal silhouette:  Stable cardiomediastinal silhouette.    Other:  No bony abnormalities.    Impression  IMPRESSION:    Left lung subsegmental atelectasis.                    : WILL  Transcribe Date/Time: Apr 11 2022  4:46A    Dictated by : DEDE HADDAD MD    This examination was interpreted and the report reviewed and  electronically signed by:  DEDE HADDAD MD on Apr 11 2022  4:47AM  EST      Chest CT Scan:  CT angio chest for pulmonary embolism 01/15/2025    Narrative  Interpreted By:  Elpidio Washington,  STUDY:  CT ANGIO CHEST FOR PULMONARY EMBOLISM;  1/15/2025 3:10 pm    INDICATION:  Signs/Symptoms:sob.    COMPARISON:  None.    ACCESSION NUMBER(S):  [id number]    ORDERING CLINICIAN:  NADINE SCHMIDT    TECHNIQUE:  Helical data acquisition of the chest was obtained with  75 mL  Omnipaque 350. Images were reformatted in axial, coronal, and  sagittal planes.MIP reformatted images were also generated.    FINDINGS:  LUNGS and AIRWAYS:  Aside from a few areas of minor atelectasis, lungs appear clear. 3 mm  nodule in the right lower lobe superior segment. No pleural effusion  or pneumothorax.    Central airways are patent. No bronchiectasis.    MEDIASTINUM and MILDRED, LOWER NECK AND AXILLA:  The visualized thyroid gland is grossly unremarkable.    11 mm subcarinal node perhaps reactive.    Esophagus is not dilated.    HEART and VESSELS:  Severe cardiomegaly.    Large pericardial effusion.    No pulmonary embolism identified.    Poor opacification of the systemic arteries which limits evaluation.  Thoracic aorta is grossly patent without aneurysm.    UPPER ABDOMEN:  Partially imaged upper abdominal ascites and anasarca. Reflux of  contrast into the distended hepatic veins.    CHEST WALL  and OSSEOUS STRUCTURES:  No suspicious osseous lesions. Multilevel degenerative changes of the  thoracic spine.    Impression  1.  No pulmonary embolism identified.  2. Severe cardiomegaly with large pericardial effusion. Reflux of  contrast into the distended hepatic veins suggests elevated right  heart pressures.  3. 3 mm right lower lobe nodule. Mildly enlarged subcarinal lymph  nodes perhaps reactive. Consider follow-up CT chest in 1 year to  document stability.  4. Partially imaged upper abdominal ascites and anasarca.      Signed by: Elpidio Washington 1/15/2025 3:33 PM  Dictation workstation:   [id number]       ECHO: 1/2025  CONCLUSIONS:   1. Poorly visualized anatomical structures due to suboptimal image quality.   2. Left ventricular ejection fraction is moderately decreased, by visual estimate at 35-40%.   3. Spectral Doppler shows an abnormal pattern of left ventricular diastolic filling.   4. There is normal right ventricular global systolic function.   5. The left atrium is moderately dilated.   6. There is a small pericardial effusion.       Labs:  CBC            16.3     10.9>---------<318              54.7     BMP  141     100    22                  ---------------------<200     4.2       33     1.04            Ca 10.3 Phos 5.0 Mg 2.78         ALT 23 AST 18 AlkPhos 71 Total bili 1.1           Current Medications:  Current Outpatient Medications   Medication Instructions    albuterol 90 mcg/actuation inhaler 2 puffs, inhalation, Every 4 hours PRN    albuterol 2.5 mg, nebulization, Every 6 hours PRN    DULoxetine (Cymbalta) 60 mg DR capsule 1 capsule, oral, Daily before breakfast        Assessment and Plan  Ms Park is a 49-year-old female with childhood asthma (no PFTs; few admissions, last 2 years ago), HTN, obesity (BMI 45) who presents to [facility] as a transfer from [facility] ED where she presented with a progressive worsening of shortness of breath over the past few months. Pulmonary consulted for  Asthma and chronic hypercapnic respiratory failure management.     #Acute Decompensated Heart Failure  #Clinical diagnosis of mild Intermittent Asthma  #Chronic hypercapnic Respiratory failure from possible Obesity Hypoventilation Syndrome  #Pulmonary nodule, Right, 3mm, Never smoker     BMI 49  BNPep 937  7.34/75/55/HCO3 35  NEGATIVE Flu A, Flu B, COVID, RSV tests  Cardiomegaly on imaging  Echo with reduced EF 35-40%, diastolic dysfunction  CTPE excludes PE; shows lung nodule  Received Lasix with good output     Recommendations:  - Agree with continued diuresis; showing significant subjective improvement in symptoms  - No indication to start inpatient NIPPV  - Will start LABA/ICS (Symbicort) Inhaler on discharge  - Will need an outpatient Sleep study and PAP titration 2-4 weeks post discharge; Please ensure pt has an appointment prior to discharge home  - Pulmonary outpatient follow up and PFTs    Assessment and plan was discussed with Dr. Leon.    Thank you for this consult. Pulmonary medicine will sign off.    Luz Li  Fellow  Pulmonary and Critical Care

## 2025-01-20 NOTE — PROGRESS NOTES
"  Internal Medicine Progress Note     Samina Park is a 49 y.o. female with a past medical history of uncontrolled HTN and asthma admitted on 1/16/2025 with acute decompensated heart failure iso newly diagnosed HFrEF.     Subjective     Overnight: NAEON.     This morning, patient endorses continued improvement in her respiratory status. She is able to lie flat without concern. Her swelling in the BLE has decreased. She has some mild anxiety regarding cardiac MRI, but does not believe she will need medication. She denies any CP or vision changes. Improved dyspnea.       Medications     Medications:   Scheduled medications  atorvastatin, 40 mg, oral, Nightly  carvedilol, 6.25 mg, oral, BID  ciprofloxacin, 1 drop, Left Eye, q4h  empagliflozin, 10 mg, oral, Daily  enoxaparin, 40 mg, subcutaneous, q12h BALA  [Held by provider] furosemide, 80 mg, intravenous, Daily  insulin glargine, 7 Units, subcutaneous, Nightly  insulin lispro, 0-5 Units, subcutaneous, TID AC  melatonin, 6 mg, oral, Daily  montelukast, 10 mg, oral, Nightly  oxygen, , inhalation, Continuous - Inhalation  predniSONE, 10 mg, oral, Daily   Followed by  [START ON 1/22/2025] predniSONE, 5 mg, oral, Daily  sacubitriL-valsartan, 2 tablet, oral, BID  spironolactone, 25 mg, oral, Daily      Continuous medications     PRN medications  PRN medications: acetaminophen, albuterol, dextrose, dextrose, glucagon, glucagon, hydrALAZINE     Objective     Vitals  Visit Vitals  /75 (BP Location: Left arm, Patient Position: Lying)   Pulse 89   Temp 36 °C (96.8 °F) (Temporal)   Resp 20   Ht 1.499 m (4' 11.02\")   Wt 103 kg (226 lb 13.7 oz)   SpO2 96%   BMI 45.79 kg/m²   Smoking Status Never   BSA 2.07 m²       Intake/Output Summary (Last 24 hours) at 1/20/2025 0919  Last data filed at 1/20/2025 0429  Gross per 24 hour   Intake --   Output 5050 ml   Net -5050 ml       Physical Exam  Constitutional: Alert, lying in bed (flat), no acute distress  HEENT: normocephalic, " atraumatic, conjunctiva normal   Respiratory: effort normal and appropriate, breath sounds heard in all fields, improving crackles and wheezes throughout  Cardiovascular: RRR, s1 and s2 noted, no murmurs, rub, or gallops appreciated, distal pulses 2+ throughout, +2 BLE edema to the knee   Abdominal: flat, soft, non-tender, normal bowel sounds   MSK/Derm: skin warm and dry, muscle tone and strength appropriate  Neurological: at baseline, AOx4  Psych: Appropriate mood and behavior    Labs  Lab Results   Component Value Date    WBC 10.9 01/20/2025    HGB 16.3 (H) 01/20/2025    HCT 54.7 (H) 01/20/2025    MCV 90 01/20/2025     01/20/2025      Lab Results   Component Value Date    GLUCOSE 200 (H) 01/20/2025    CALCIUM 10.3 01/20/2025     01/20/2025    K 4.2 01/20/2025    CO2 33 (H) 01/20/2025     01/20/2025    BUN 22 01/20/2025    CREATININE 1.04 01/20/2025      Lab Results   Component Value Date    ALT 23 01/17/2025    AST 18 01/17/2025    ALKPHOS 71 01/17/2025    BILITOT 1.1 01/17/2025        Imaging  === 01/15/25 ===    CT ANGIO CHEST FOR PULMONARY EMBOLISM    - Impression -  1.  No pulmonary embolism identified.  2. Severe cardiomegaly with large pericardial effusion. Reflux of  contrast into the distended hepatic veins suggests elevated right  heart pressures.  3. 3 mm right lower lobe nodule. Mildly enlarged subcarinal lymph  nodes perhaps reactive. Consider follow-up CT chest in 1 year to  document stability.  4. Partially imaged upper abdominal ascites and anasarca.    TTE (01/15/25):  1. Poorly visualized anatomical structures due to suboptimal image quality.                           2. Left ventricular ejection fraction is moderately decreased, by visual estimate at 35-40%.   3. Spectral Doppler shows an abnormal pattern of left ventricular diastolic filling.   4. There is normal right ventricular global systolic function.   5. The left atrium is moderately dilated.   6. There is a small  pericardial effusion.       Assessment/Plan   Samina Park is a 49-year-old female with PMH significant for HTN, HLD, obesity, asthma, depression. Patient presented as a transfer from [facility] where she presented with acute on chronic dyspnea worsening over the last few months which she initially attributed to poorly controlled asthma. Treated with several courses of glucocorticoids with no improvement and had recently been taking prescribed by urgent care earlier this week. She was found to be in acute decompensated heart failure with new HFrEF based on TTE findings here at [facility]. Now undergoing aggressive diuresis with IV Lasix and a course of Diamox started (1/17-19) with improving clinical status. Pending cardiac MRI.     #New ADHF, HFrEF NYHA Class III  #C/f pericardial effusion  #Sinus tachycardia  #Anasarca  #HTN  :: CT PE c/f large pericardial effusion, though noted as small on TTE  :: TTE on presentation showing EF 35-40%, abnormal LV diastolic filling, moderately dilated LA, small pericardial effusion   ::   :: TSH wnl   :: hemoglobin A1c 8.2%  :: UDS -ve  :: UA w/out massive proteinuria to suggest renal etiology   :: HIV:  non-reactive  Plan:  - Entresto 50 mg BID   - carvedilol 6.25 mg BID   - Jardiance 10 mg every day  - Spironolactone 25 mg every day   - Lasix 40 IV today   - Acetazolamide 500 mg daily (1/17- last date today 1/19)  - Cardiac MRI today   - Could benefit from GLP-1, F/U outpatient    #HTN  #DLD  : :Tchol 155, HDL 27, LDL 92, Trig 183   : :hemogloboin A1c 8.2%  : :ASCVD score: 7.4% risk of cardiovascular event in next 10 years  Plan:  -Atorvastatin 40 mg daily     #Leukocytosis - resolved  :: likely due to outpatient steroid course for misdiagnosis of asthma exacerbation   :: not meeting SIRS criteria, no possible source  Plan:  -prednisone taper  -continue to trend WBC  -monitor for infectious signs/symptoms    #Asthma  #Chronic Hypercapnic RF  #c/f OHS and/or DARELL  #Pulmonary  Nodule Right 3mm, Never smoker  :: home meds- albuterol inhaler and nebs PRN  :: VBG showing CO2 75, likely has retention at baseline  :: elevated bicarb likely compensation   :: unable to see PFTs in EMR  Plan:  - continue albuterol nebs for now  - Goal Bicarb 38-40 per Pulm iso diuresis  - No plans for CPAP/BiPAP while inpatient   - outpatient Pulm: sleep study & PFTs   - discharge with LABA/ICS prn    #Depression  -continue home cymbalta     F: Avoiding in ADHF   E: PRN,    N: Cardiac (NPO MN)  A: pIV  DVT prophylaxis: Lovenox  Code status: Full code confirmed on admission  NOK: HARMEET Jenkins [phone]   ---  James Troy MD   Internal Medicine/Pediatrics PGY-1

## 2025-01-20 NOTE — PROGRESS NOTES
Occupational Therapy    Evaluation    Patient Name: Samina Park  MRN: [id number]  Department: [facility] MRI  Room: 5068/5068-A  Today's Date: 1/20/2025  Time Calculation  Start Time: 0831  Stop Time: 0846  Time Calculation (min): 15 min    Assessment  IP OT Assessment  OT Assessment: difficulty I/ADLS, fxnl mob  Prognosis: Good  Barriers to Discharge Home: No anticipated barriers  Evaluation/Treatment Tolerance: Patient tolerated treatment well  Medical Staff Made Aware: Yes  End of Session Communication: Bedside nurse  End of Session Patient Position: Up in chair, Alarm off, not on at start of session  Plan:  Treatment Interventions: ADL retraining, Endurance training, Equipment evaluation/education, Compensatory technique education, Functional transfer training  OT Frequency: 2 times per week  OT Discharge Recommendations: No OT needed after discharge  Equipment Recommended upon Discharge:  (shower chair, long handled sponge, reacher)  OT Recommended Transfer Status: Assist of 1  OT - OK to Discharge: Yes    Subjective   Current Problem:  1. Anasarca        2. Sleep apnea, unspecified type  Referral to Adult Sleep Medicine      3. Primary hypertension  Referral to Primary Care      4. Asthma, unspecified asthma severity, unspecified whether complicated, unspecified whether persistent ([facility])  Referral to Pulmonology      5. Heart failure, unspecified HF chronicity, unspecified heart failure type  Referral to Cardiology    Referral to [facility]        General:  General  Reason for Referral: Acute on chronic dyspnea  Past Medical History Relevant to Rehab: HTN, asthma, and depression  Family/Caregiver Present: No  Prior to Session Communication: Bedside nurse  Patient Position Received: Bed, 3 rail up, Alarm off, not on at start of session  General Comment: pleasant, cooperative  Precautions:  Medical Precautions: Fall precautions, Oxygen therapy device and L/min           Pain:  Pain Assessment  Pain Assessment:  0-10  0-10 (Numeric) Pain Score: 0 - No pain    Objective   Cognition:  Overall Cognitive Status: Within Functional Limits  Orientation Level: Oriented X4  Safety Judgment: Good awareness of safety precautions  Insight: Within function limits           Home Living:  Type of Home: House  Lives With: Adult children, Dependent children, Siblings  Home Adaptive Equipment: Walker rolling or standard, Cane  Home Layout: Multi-level, Laundry in basement, Stairs to alternate level with rails (4 MALACHI HR, flight to 2nd floor bed/bath)  Bathroom Shower/Tub: Tub/shower unit  Bathroom Toilet: Standard  Bathroom Equipment: Grab bars in shower   Prior Function:  Level of [place]:  (I ADLs but A LBD lately 2/2 fluid overload, I IADLs)  Receives Help From: Family  Ambulatory Assistance: Independent  Vocational: Full time employment  Leisure: Socialize with family  Hand Dominance: Right  IADL History:  IADL Comments: A as needed from family but I, +drives, +dog and cat  ADL:  Eating Assistance: Independent  Grooming Assistance:  (SBA anticipated standing)  Bathing Assistance: Stand by (anticipated AE)  UE Dressing Assistance: Independent  LE Dressing Assistance:  (donned B socks EOB SBA)  Toileting Assistance with Device: Stand by (anticiapted)  Functional Deficit: Setup, Increased time to complete  Activity Tolerance:  Endurance:  (fair, SOB minimal activity)  Bed Mobility/Transfers: Bed Mobility  Bed Mobility:  (sup to sit S)    Transfers  Transfer:  (sit/stand SBA)      Functional Mobility:  Functional Mobility  Functional Mobility Performed:  (pt performed fxnl mob bed to chair SBA)  Sitting Balance:  Dynamic Sitting Balance  Dynamic Sitting-Level of Assistance: Independent  Standing Balance:  Dynamic Standing Balance  Dynamic Standing-Level of Assistance:  (SBA)    IADL's:   IADL Comments: A as needed from family but I, +drives, +dog and cat  Vision: Vision - Basic Assessment  Current Vision: Wears glasses all the  time  Sensation:  Light Touch: No apparent deficits  Strength:  Strength Comments: NAEEM WFL       Coordination:  Movements are Fluid and Coordinated: Yes   Hand Function:  Hand Function  Gross Grasp: Functional  Extremities: RUE   RUE : Within Functional Limits and LUE   LUE: Within Functional Limits      Outcome Measures: [facility] Daily Activity  Putting on and taking off regular lower body clothing: A little  Bathing (including washing, rinsing, drying): A little  Putting on and taking off regular upper body clothing: None  Toileting, which includes using toilet, bedpan or urinal: A little  Taking care of personal grooming such as brushing teeth: None  Eating Meals: None  Daily Activity - Total Score: 21      Education Documentation  Body Mechanics, taught by Kristy Diehl OT at 1/20/2025 11:41 AM.  Learner: Patient  Readiness: Acceptance  Method: Explanation, Demonstration  Response: Verbalizes Understanding, Needs Reinforcement  Comment: vijay abdul ADLs    Precautions, taught by Kristy Diehl OT at 1/20/2025 11:41 AM.  Learner: Patient  Readiness: Acceptance  Method: Explanation, Demonstration  Response: Verbalizes Understanding, Needs Reinforcement  Comment: vijay TAYLORs    ADL Training, taught by Kristy Diehl OT at 1/20/2025 11:41 AM.  Learner: Patient  Readiness: Acceptance  Method: Explanation, Demonstration  Response: Verbalizes Understanding, Needs Reinforcement  Comment: vijay abdul ADLs    Education Comments  No comments found.      Goals:   Encounter Problems       Encounter Problems (Active)       ADLs       Patient will perform UB and LB bathing  with independent level of assistance  (Progressing)       Start:  01/20/25    Expected End:  02/10/25            Patient with complete upper body dressing with independent level of assistance  (Progressing)       Start:  01/20/25    Expected End:  02/10/25            Patient with complete lower body dressing with independent level of assistance   (Progressing)       Start:  01/20/25    Expected End:  02/10/25            Patient will complete daily grooming tasks  with independent level  (Progressing)       Start:  01/20/25    Expected End:  02/10/25            Patient will complete toileting including hygiene clothing management/hygiene with independent level of assistance  (Progressing)       Start:  01/20/25    Expected End:  02/10/25               MOBILITY       Patient will perform Functional mobility max Household distances/Community Distances with independent level of assistance (Progressing)       Start:  01/20/25    Expected End:  02/10/25               TRANSFERS       Patient will complete functional transfer independent level of assistance. (Progressing)       Start:  01/20/25    Expected End:  02/10/25

## 2025-01-20 NOTE — CARE PLAN
The patient's goals for the shift include Safety    The clinical goals for the shift include safety    Over the shift, the patient did make progress toward the following goals    Problem: Safety - Adult  Goal: Free from fall injury  Outcome: Progressing  Flowsheets (Taken 1/19/2025 2141)  Free from fall injury:   Instruct family/caregiver on patient safety   Based on caregiver fall risk screen, instruct family/caregiver to ask for assistance with transferring infant if caregiver noted to have fall risk factors   .

## 2025-01-21 LAB
ALBUMIN SERPL BCP-MCNC: 4 G/DL (ref 3.4–5)
ANION GAP SERPL CALC-SCNC: 22 MMOL/L (ref 10–20)
ATRIAL RATE: 112 BPM
BASOPHILS # BLD AUTO: 0.07 X10*3/UL (ref 0–0.1)
BASOPHILS NFR BLD AUTO: 0.5 %
BUN SERPL-MCNC: 24 MG/DL (ref 6–23)
CALCIUM SERPL-MCNC: 10.1 MG/DL (ref 8.6–10.6)
CHLORIDE SERPL-SCNC: 97 MMOL/L (ref 98–107)
CO2 SERPL-SCNC: 21 MMOL/L (ref 21–32)
CREAT SERPL-MCNC: 1.08 MG/DL (ref 0.5–1.05)
EGFRCR SERPLBLD CKD-EPI 2021: 63 ML/MIN/1.73M*2
EOSINOPHIL # BLD AUTO: 0.11 X10*3/UL (ref 0–0.7)
EOSINOPHIL NFR BLD AUTO: 0.8 %
ERYTHROCYTE [DISTWIDTH] IN BLOOD BY AUTOMATED COUNT: 14.3 % (ref 11.5–14.5)
GLUCOSE BLD MANUAL STRIP-MCNC: 162 MG/DL (ref 74–99)
GLUCOSE BLD MANUAL STRIP-MCNC: 183 MG/DL (ref 74–99)
GLUCOSE BLD MANUAL STRIP-MCNC: 196 MG/DL (ref 74–99)
GLUCOSE BLD MANUAL STRIP-MCNC: 225 MG/DL (ref 74–99)
GLUCOSE BLD MANUAL STRIP-MCNC: 246 MG/DL (ref 74–99)
GLUCOSE BLD MANUAL STRIP-MCNC: 290 MG/DL (ref 74–99)
GLUCOSE SERPL-MCNC: 179 MG/DL (ref 74–99)
HCT VFR BLD AUTO: 61.9 % (ref 36–46)
HGB BLD-MCNC: 18 G/DL (ref 12–16)
IMM GRANULOCYTES # BLD AUTO: 0.08 X10*3/UL (ref 0–0.7)
IMM GRANULOCYTES NFR BLD AUTO: 0.6 % (ref 0–0.9)
LYMPHOCYTES # BLD AUTO: 3.19 X10*3/UL (ref 1.2–4.8)
LYMPHOCYTES NFR BLD AUTO: 24.2 %
MAGNESIUM SERPL-MCNC: 3.57 MG/DL (ref 1.6–2.4)
MCH RBC QN AUTO: 27 PG (ref 26–34)
MCHC RBC AUTO-ENTMCNC: 29.1 G/DL (ref 32–36)
MCV RBC AUTO: 93 FL (ref 80–100)
MONOCYTES # BLD AUTO: 1.12 X10*3/UL (ref 0.1–1)
MONOCYTES NFR BLD AUTO: 8.5 %
NEUTROPHILS # BLD AUTO: 8.59 X10*3/UL (ref 1.2–7.7)
NEUTROPHILS NFR BLD AUTO: 65.4 %
NRBC BLD-RTO: 0 /100 WBCS (ref 0–0)
P AXIS: 59 DEGREES
P OFFSET: 205 MS
P ONSET: 151 MS
PHOSPHATE SERPL-MCNC: 4.6 MG/DL (ref 2.5–4.9)
PLATELET # BLD AUTO: 304 X10*3/UL (ref 150–450)
POTASSIUM SERPL-SCNC: 4.5 MMOL/L (ref 3.5–5.3)
PR INTERVAL: 138 MS
PROT SERPL-MCNC: 6.3 G/DL (ref 6.4–8.2)
PROT UR-ACNC: 9 MG/DL (ref 5–25)
Q ONSET: 220 MS
QRS COUNT: 18 BEATS
QRS DURATION: 100 MS
QT INTERVAL: 358 MS
QTC CALCULATION(BAZETT): 488 MS
QTC FREDERICIA: 440 MS
R AXIS: 116 DEGREES
RBC # BLD AUTO: 6.67 X10*6/UL (ref 4–5.2)
SODIUM SERPL-SCNC: 135 MMOL/L (ref 136–145)
T AXIS: 17 DEGREES
T OFFSET: 399 MS
VENTRICULAR RATE: 112 BPM
WBC # BLD AUTO: 13.2 X10*3/UL (ref 4.4–11.3)

## 2025-01-21 PROCEDURE — 2500000002 HC RX 250 W HCPCS SELF ADMINISTERED DRUGS (ALT 637 FOR MEDICARE OP, ALT 636 FOR OP/ED)

## 2025-01-21 PROCEDURE — 1200000002 HC GENERAL ROOM WITH TELEMETRY DAILY

## 2025-01-21 PROCEDURE — 36415 COLL VENOUS BLD VENIPUNCTURE: CPT

## 2025-01-21 PROCEDURE — 2500000005 HC RX 250 GENERAL PHARMACY W/O HCPCS

## 2025-01-21 PROCEDURE — 83735 ASSAY OF MAGNESIUM: CPT

## 2025-01-21 PROCEDURE — 97535 SELF CARE MNGMENT TRAINING: CPT | Mod: GO

## 2025-01-21 PROCEDURE — 97530 THERAPEUTIC ACTIVITIES: CPT | Mod: GO

## 2025-01-21 PROCEDURE — 84166 PROTEIN E-PHORESIS/URINE/CSF: CPT

## 2025-01-21 PROCEDURE — 84156 ASSAY OF PROTEIN URINE: CPT

## 2025-01-21 PROCEDURE — 80069 RENAL FUNCTION PANEL: CPT

## 2025-01-21 PROCEDURE — 82947 ASSAY GLUCOSE BLOOD QUANT: CPT

## 2025-01-21 PROCEDURE — 85025 COMPLETE CBC W/AUTO DIFF WBC: CPT

## 2025-01-21 PROCEDURE — 2500000001 HC RX 250 WO HCPCS SELF ADMINISTERED DRUGS (ALT 637 FOR MEDICARE OP)

## 2025-01-21 PROCEDURE — 2500000004 HC RX 250 GENERAL PHARMACY W/ HCPCS (ALT 636 FOR OP/ED)

## 2025-01-21 PROCEDURE — 99232 SBSQ HOSP IP/OBS MODERATE 35: CPT | Performed by: INTERNAL MEDICINE

## 2025-01-21 RX ORDER — FUROSEMIDE 20 MG/1
20 TABLET ORAL DAILY
Status: DISCONTINUED | OUTPATIENT
Start: 2025-01-21 | End: 2025-01-23 | Stop reason: HOSPADM

## 2025-01-21 RX ADMIN — CIPROFLOXACIN 1 DROP: 3 SOLUTION OPHTHALMIC at 13:53

## 2025-01-21 RX ADMIN — FUROSEMIDE 20 MG: 20 TABLET ORAL at 13:53

## 2025-01-21 RX ADMIN — SPIRONOLACTONE 25 MG: 25 TABLET, FILM COATED ORAL at 08:56

## 2025-01-21 RX ADMIN — ATORVASTATIN CALCIUM 40 MG: 40 TABLET, FILM COATED ORAL at 20:17

## 2025-01-21 RX ADMIN — ENOXAPARIN SODIUM 40 MG: 100 INJECTION SUBCUTANEOUS at 08:56

## 2025-01-21 RX ADMIN — SACUBITRIL AND VALSARTAN 2 TABLET: 24; 26 TABLET, FILM COATED ORAL at 20:17

## 2025-01-21 RX ADMIN — CIPROFLOXACIN 1 DROP: 3 SOLUTION OPHTHALMIC at 03:02

## 2025-01-21 RX ADMIN — INSULIN LISPRO 2 UNITS: 100 INJECTION, SOLUTION INTRAVENOUS; SUBCUTANEOUS at 13:30

## 2025-01-21 RX ADMIN — INSULIN GLARGINE 7 UNITS: 100 INJECTION, SOLUTION SUBCUTANEOUS at 23:14

## 2025-01-21 RX ADMIN — CIPROFLOXACIN 1 DROP: 3 SOLUTION OPHTHALMIC at 23:14

## 2025-01-21 RX ADMIN — MONTELUKAST 10 MG: 10 TABLET, FILM COATED ORAL at 20:17

## 2025-01-21 RX ADMIN — Medication 6 MG: at 17:55

## 2025-01-21 RX ADMIN — INSULIN LISPRO 3 UNITS: 100 INJECTION, SOLUTION INTRAVENOUS; SUBCUTANEOUS at 17:51

## 2025-01-21 RX ADMIN — EMPAGLIFLOZIN 10 MG: 10 TABLET, FILM COATED ORAL at 08:58

## 2025-01-21 RX ADMIN — ENOXAPARIN SODIUM 40 MG: 100 INJECTION SUBCUTANEOUS at 20:17

## 2025-01-21 RX ADMIN — Medication 2 L/MIN: at 20:17

## 2025-01-21 RX ADMIN — SACUBITRIL AND VALSARTAN 2 TABLET: 24; 26 TABLET, FILM COATED ORAL at 08:56

## 2025-01-21 RX ADMIN — CIPROFLOXACIN 1 DROP: 3 SOLUTION OPHTHALMIC at 06:21

## 2025-01-21 RX ADMIN — CIPROFLOXACIN 1 DROP: 3 SOLUTION OPHTHALMIC at 17:55

## 2025-01-21 RX ADMIN — INSULIN LISPRO 1 UNITS: 100 INJECTION, SOLUTION INTRAVENOUS; SUBCUTANEOUS at 08:58

## 2025-01-21 RX ADMIN — CARVEDILOL 6.25 MG: 6.25 TABLET, FILM COATED ORAL at 08:56

## 2025-01-21 RX ADMIN — CARVEDILOL 6.25 MG: 6.25 TABLET, FILM COATED ORAL at 20:17

## 2025-01-21 RX ADMIN — PREDNISONE 10 MG: 5 TABLET ORAL at 08:56

## 2025-01-21 ASSESSMENT — COGNITIVE AND FUNCTIONAL STATUS - GENERAL
DRESSING REGULAR UPPER BODY CLOTHING: A LITTLE
DAILY ACTIVITIY SCORE: 24
DRESSING REGULAR LOWER BODY CLOTHING: A LITTLE
DAILY ACTIVITIY SCORE: 21
DAILY ACTIVITIY SCORE: 24
HELP NEEDED FOR BATHING: A LITTLE
MOBILITY SCORE: 24
MOBILITY SCORE: 24

## 2025-01-21 ASSESSMENT — PAIN SCALES - GENERAL
PAINLEVEL_OUTOF10: 0 - NO PAIN
PAINLEVEL_OUTOF10: 0 - NO PAIN

## 2025-01-21 ASSESSMENT — ACTIVITIES OF DAILY LIVING (ADL): HOME_MANAGEMENT_TIME_ENTRY: 13

## 2025-01-21 ASSESSMENT — PAIN - FUNCTIONAL ASSESSMENT: PAIN_FUNCTIONAL_ASSESSMENT: 0-10

## 2025-01-21 NOTE — SIGNIFICANT EVENT
Rapid Response Nurse Note:  [x] RADAR alert/Score 6    Pager time:   Arrival time:  end time:   Location: LT 5068  [x] Phone triage     Rapid response initiated by:  [] Rapid Response RN [] Family [] Nursing Supervisor [] Physician   [x] RADAR auto-page [] Sepsis auto-page [] RN [] RT   [] NP/PA [] Other:     Primary reason for call:   [] BAT [] New CPAP/BiPAP [] Bleeding [] Change in mental status   [] Chest pain [] Code blue [] FiO2 >/= 50% [] HR </= 40 bpm   [] HR >/= 130 bpm [] Hyperglycemia [] Hypoglycemia [x] RADAR    [] RR </= 8 bpm [] RR >/= 30 bpm [] SBP </= 90 mmHg [] SpO2 < 90%   [] Seizure [] Sepsis [] Staff concern:     Initial VS and/or RADAR VS:  radar vitals below in bold    Vitals:    25 1225 25 1515 25   BP: 105/74 104/65 103/66    BP Location: Left arm Left arm Right arm    Patient Position: Lying Lying Lying    Pulse: 90 90 96    Resp:  20 20    Temp: 36.1 °C (97 °F) 36 °C (96.8 °F) 36 °C (96.8 °F)    TempSrc: Temporal Temporal Temporal    SpO2: 97% 94% 91% 93%   Weight:       Height:            Interventions:  [x] None [] ABG [] Assist w/ICU transfer [] BAT paged    [] Bag mask [] Blood [] Cardioversion [] Code Blue   [] Code blue for intubation [] Code status changed [] Chest x-ray [] EKG   [] IV fluid/bolus [] KUB x-ray [] Labs/cultures [] Medication   [] Nebulizer treatment [] NIPPV (CPAP/BiPAP) [] Oxygen [] Oral airway   [] Peripheral IV [] Palliative care consult [] CT/MRI [] Sepsis protocol    [] Suctioned [] Other:       Outcome:  [] Coded and  [] Code blue for intubation [] Coded and transferred to ICU []  on division   [x] Remained on division (no change) [] Remained on division + additional monitoring [] Remained in ED [] Transferred to ED   [] Transferred to ICU [] Transferred to inpatient status [] Transferred for interventions (procedure) [] Transferred to ICU stepdown    [] Transferred to surgery [] Transferred  to telemetry [] Sepsis protocol [] STEMI protocol   [] Stroke protocol [x] Bedside nurse instructed to page rapid response for any concerns or acute change in condition/VS     Additional Comments: Spoke to RN regarding vital signs.  Pt with oxygen off her face, placed back on, Pox rechecked, no distress. No concerns from RN at this time.

## 2025-01-21 NOTE — NURSING NOTE
Ms. Park has begun to review the DM handbook.  No specific questions.  States not sure of when she may be discharged or what diabetes related meds she will discharged on.    She is tolerating Jardiance thus far.  She is aware of the effects of steroids on B  Reviewed her steroid taper plan again today.  Education provided re GLP-1 use.  She practiced with both Ozempic and Trulicity demo pens.  Message to team re plan for diabetes management at discharge.  Will follow up in am.  Time spent:  30 mins.

## 2025-01-21 NOTE — PROGRESS NOTES
Occupational Therapy    Occupational Therapy Treatment    Name: Samina Park  MRN: [id number]  Department: [place] [address]  Room: [facility]5068-A  Date: 01/21/25  Time Calculation  Start Time: 1155  Stop Time: 1218  Time Calculation (min): 23 min    Assessment:  OT Assessment: NN  Prognosis: Good  Barriers to Discharge Home: No anticipated barriers  Evaluation/Treatment Tolerance: Patient tolerated treatment well  Medical Staff Made Aware: Yes  End of Session Communication: Bedside nurse  End of Session Patient Position: Up in chair, Alarm off, not on at start of session  Plan:  Treatment Interventions: ADL retraining, Endurance training, Patient/family training, Equipment evaluation/education, Compensatory technique education  OT Frequency: 2 times per week  OT Discharge Recommendations: No OT needed after discharge  Equipment Recommended upon Discharge:  (shower chair, long handled sponge, reacher)  OT Recommended Transfer Status: Assist of 1  OT - OK to Discharge: Yes    Subjective   Previous Visit Info:  OT Last Visit  OT Received On: 01/21/25  General:  General  Reason for Referral: Acute on chronic dyspnea  Past Medical History Relevant to Rehab: HTN, asthma, and depression  Family/Caregiver Present: No  Prior to Session Communication: Bedside nurse  Patient Position Received: Up in chair, Alarm off, not on at start of session  Precautions:  Medical Precautions: Fall precautions    Vital Signs (Past 2hrs)        Date/Time Vitals Session Patient Position Pulse Resp SpO2 BP MAP (mmHg)    01/21/25 1155 --  --  99  18  93 %  107/74  85                        Pain Assessment:  Pain Assessment  Pain Assessment: 0-10  0-10 (Numeric) Pain Score: 0 - No pain     Objective   Cognition:  Overall Cognitive Status: Within Functional Limits  Orientation Level: Oriented X4  Following Commands: Follows all commands and directions without difficulty  Safety Judgment: Good awareness of safety precautions  Insight: Within function  limits  Activities of Daily Living:      Grooming  Grooming Level of Assistance:  (standing sink side hand washing, face washing, oral care I)    UE Bathing  UE Bathing Level of Assistance:  (pt sponge bathed UB trunk BUE, armpits S standing, wipes)         UE Dressing  UE Dressing Level of Assistance:  (min A doff/jennifer gown standing)         Toileting  Toileting Level of Assistance:  (sit/stand toilet transfers, toileting, truong care I)         Bed Mobility/Transfers: Transfers  Transfer:  (sit stand from chair I, toilet transfers I, sit/stand chair I and stand to sit I)      Functional Mobility:  Functional Mobility  Functional Mobility Performed:  (pt performed fxnl mob to/from chair/bathroom to chair, rest break required I. Pt performed fxnl mob to/from chair/1/2 lap around floor x1 rest break S-I)    Outcome Measures:  [facility] Daily Activity  Putting on and taking off regular lower body clothing: A little  Bathing (including washing, rinsing, drying): A little  Putting on and taking off regular upper body clothing: A little  Toileting, which includes using toilet, bedpan or urinal: None  Taking care of personal grooming such as brushing teeth: None  Eating Meals: None  Daily Activity - Total Score: 21        Education Documentation  Body Mechanics, taught by Kristy Diehl OT at 1/21/2025 12:50 PM.  Learner: Patient  Readiness: Acceptance  Method: Explanation, Demonstration  Response: Verbalizes Understanding, Demonstrated Understanding  Comment: fxnl mob, energy conservation techniques, ADLs    Precautions, taught by Kristy Diehl OT at 1/21/2025 12:50 PM.  Learner: Patient  Readiness: Acceptance  Method: Explanation, Demonstration  Response: Verbalizes Understanding, Demonstrated Understanding  Comment: fxnl mob, energy conservation techniques, ADLs    ADL Training, taught by Kristy Diehl OT at 1/21/2025 12:50 PM.  Learner: Patient  Readiness: Acceptance  Method: Explanation, Demonstration  Response:  Verbalizes Understanding, Demonstrated Understanding  Comment: fxnl mob, energy conservation techniques, ADLs    Education Comments  No comments found.      Goals:  Encounter Problems       Encounter Problems (Active)       ADLs       Patient will perform UB and LB bathing  with independent level of assistance  (Progressing)       Start:  01/20/25    Expected End:  02/10/25            Patient with complete upper body dressing with independent level of assistance  (Progressing)       Start:  01/20/25    Expected End:  02/10/25            Patient with complete lower body dressing with independent level of assistance  (Progressing)       Start:  01/20/25    Expected End:  02/10/25            Patient will complete daily grooming tasks  with independent level  (Progressing)       Start:  01/20/25    Expected End:  02/10/25            Patient will complete toileting including hygiene clothing management/hygiene with independent level of assistance  (Progressing)       Start:  01/20/25    Expected End:  02/10/25               MOBILITY       Patient will perform Functional mobility max Household distances/Community Distances with independent level of assistance (Progressing)       Start:  01/20/25    Expected End:  02/10/25               TRANSFERS       Patient will complete functional transfer independent level of assistance. (Progressing)       Start:  01/20/25    Expected End:  02/10/25

## 2025-01-21 NOTE — PROGRESS NOTES
"  Internal Medicine Progress Note     Samina Park is a 49 y.o. female with a past medical history of uncontrolled HTN and asthma admitted on 1/16/2025 with acute decompensated heart failure iso newly diagnosed HFrEF.     Subjective     Overnight: NAEON.     This morning, patient endorses feeling well. She think her breathing is at her baseline and is okay to try being without oxygen.       Medications     Medications:   Scheduled medications  atorvastatin, 40 mg, oral, Nightly  carvedilol, 6.25 mg, oral, BID  ciprofloxacin, 1 drop, Left Eye, q4h  empagliflozin, 10 mg, oral, Daily  enoxaparin, 40 mg, subcutaneous, q12h BALA  furosemide, 20 mg, oral, Daily  insulin glargine, 7 Units, subcutaneous, Nightly  insulin lispro, 0-5 Units, subcutaneous, TID AC  melatonin, 6 mg, oral, Daily  montelukast, 10 mg, oral, Nightly  oxygen, , inhalation, Continuous - Inhalation  [START ON 1/22/2025] predniSONE, 5 mg, oral, Daily  sacubitriL-valsartan, 2 tablet, oral, BID  spironolactone, 25 mg, oral, Daily      Continuous medications     PRN medications  PRN medications: acetaminophen, albuterol, dextrose, dextrose, glucagon, glucagon, hydrALAZINE     Objective     Vitals  Visit Vitals  /74   Pulse 99   Temp 36 °C (96.8 °F)   Resp 18   Ht 1.499 m (4' 11.02\")   Wt 101 kg (221 lb 12.5 oz)   SpO2 93%   BMI 44.77 kg/m²   Smoking Status Never   BSA 2.05 m²       Intake/Output Summary (Last 24 hours) at 1/21/2025 1411  Last data filed at 1/21/2025 1300  Gross per 24 hour   Intake --   Output 3650 ml   Net -3650 ml       Physical Exam  Constitutional: Alert, lying in bed (flat), no acute distress  HEENT: normocephalic, atraumatic, conjunctiva normal   Respiratory: effort normal and appropriate, breath sounds heard in all fields, improving crackles and wheezes throughout  Cardiovascular: RRR, s1 and s2 noted, no murmurs, rub, or gallops appreciated, distal pulses 2+ throughout, +2 BLE edema to the knee   Abdominal: flat, soft, " non-tender, normal bowel sounds   MSK/Derm: skin warm and dry, muscle tone and strength appropriate  Neurological: at baseline, AOx4  Psych: Appropriate mood and behavior    Labs  Lab Results   Component Value Date    WBC 13.2 (H) 01/21/2025    HGB 18.0 (H) 01/21/2025    HCT 61.9 (H) 01/21/2025    MCV 93 01/21/2025     01/21/2025      Lab Results   Component Value Date    GLUCOSE 179 (H) 01/21/2025    CALCIUM 10.1 01/21/2025     (L) 01/21/2025    K 4.5 01/21/2025    CO2 21 01/21/2025    CL 97 (L) 01/21/2025    BUN 24 (H) 01/21/2025    CREATININE 1.08 (H) 01/21/2025      Lab Results   Component Value Date    ALT 23 01/17/2025    AST 18 01/17/2025    ALKPHOS 71 01/17/2025    BILITOT 1.1 01/17/2025        Imaging    Cardiac MRI (01/20/25):   IMPRESSION:  1.  Normal LV size (EDVi 92 ml/m2) with severely reduced systolic  function (LVEF 26%).  2.  Global hypokinesis with minor regional variation.  3.  Mild LVH (1.2-1.3 cm, septum)  4. Regionally elevated myocardial T2 values suggestive of possible  myocardial edema/inflammation.  5.  Increased ECV 32%, suggestive of diffuse myocardial fibrosis vs  edema.  6.  LGE imaging reveals diffuse global enhancement suggestive of  diffuse fibrosis vs infiltration/amyloid.  7.  Moderately dilated RV with moderately impaired systolic function  (RVEF 32%).  8.  Dilated pulmonary arteries. Correlate/concern for elevated  pulmonary pressures.  9.  Moderate pericardial effusion with the largest pocket around the  left ventricle measuring 1.4 cm. Normal pericardial thickness. No  evidence of respirophasic septal shift or RV/RA collapse to suggest  tamponade.  10. No evidence of significant intracardiac shunt (Qp:Qs 0.94).      The CMR demonstrates non-ischemic cardiomyopathy with severe LV  systolic dysfunction and diffuse LGE with focally elevated T2 values.  Differentials include but are not limited to myocardial  infiltration/amyloid vs myocarditis/inflammatory  cardiomyopathies.  Clinical correlation recommended.       Assessment/Plan   Samina Park is a 49-year-old female with PMH significant for HTN, HLD, obesity, asthma, depression. Patient presented as a transfer from [facility] where she presented with acute on chronic dyspnea worsening over the last few months which she initially attributed to poorly controlled asthma. Treated with several courses of glucocorticoids with no improvement and had recently been taking prescribed by urgent care earlier this week. She was found to be in acute decompensated heart failure with new HFrEF based on TTE findings here at [facility]. Now undergoing aggressive diuresis with IV Lasix and a course of Diamox started (1/17-19) with improving clinical status. Cardiac MRI concerning for infiltrative process, planning to r/o amyloidosis.     #New ADHF, HFrEF NYHA Class III  #C/f pericardial effusion  #Sinus tachycardia  #Anasarca  #HTN  :: CT PE c/f large pericardial effusion, though noted as small on TTE  :: Cardiac MRI showing non-ischemic cardiomyopathy with severe LV dysfunction and findings c/f myocardial infiltration/amyloidosis  :: TTE on presentation showing EF 35-40%, abnormal LV diastolic filling, moderately dilated LA, small pericardial effusion   ::   :: TSH wnl   :: hemoglobin A1c 8.2%  :: UDS -ve  :: UA w/out massive proteinuria to suggest renal etiology   :: HIV: non-reactive  Plan:  - Entresto 50 mg BID   - carvedilol 6.25 mg BID   - Jardiance 10 mg every day  - Spironolactone 25 mg every day   - Lasix 20 mg PO daily    - Plan for SPEP and UPEP w/ immunofixation   - Plan for NM PYP cardiac amyloidosis w/ SPECT CT  - Could benefit from GLP-1, F/U outpatient    #HTN  #DLD  : :Tchol 155, HDL 27, LDL 92, Trig 183   : :hemogloboin A1c 8.2%  : :ASCVD score: 7.4% risk of cardiovascular event in next 10 years  Plan:  -Atorvastatin 40 mg daily     #Leukocytosis - resolved  :: likely due to outpatient steroid course for  misdiagnosis of asthma exacerbation   :: not meeting SIRS criteria, no possible source  Plan:  -prednisone taper  -continue to trend WBC  -monitor for infectious signs/symptoms    #Asthma  #Chronic Hypercapnic RF  #c/f OHS and/or DARELL  #Pulmonary Nodule Right 3mm, Never smoker  :: home meds- albuterol inhaler and nebs PRN  :: VBG showing CO2 75, likely has retention at baseline  :: elevated bicarb likely compensation   :: unable to see PFTs in EMR  Plan:  - continue albuterol nebs for now  - Goal Bicarb 38-40 per Pulm iso diuresis  - No plans for CPAP/BiPAP while inpatient   - outpatient Pulm: sleep study & PFTs   - discharge with LABA/ICS prn    #Depression  -continue home cymbalta     F: Avoiding in ADHF   E: PRN,    N: Cardiac (NPO MN)  A: pIV  DVT prophylaxis: Lovenox  Code status: Full code confirmed on admission  NOK: HARMEET Jenkins [phone]   ---  James Troy MD   Internal Medicine/Pediatrics PGY-1

## 2025-01-21 NOTE — CARE PLAN
Problem: Skin  Goal: Decreased wound size/increased tissue granulation at next dressing change  Outcome: Progressing  Goal: Participates in plan/prevention/treatment measures  Outcome: Progressing  Goal: Prevent/manage excess moisture  Outcome: Progressing  Goal: Prevent/minimize sheer/friction injuries  Outcome: Progressing  Goal: Promote/optimize nutrition  Outcome: Progressing  Goal: Promote skin healing  Outcome: Progressing     Problem: Safety - Adult  Goal: Free from fall injury  Outcome: Progressing     Problem: Discharge Planning  Goal: Discharge to home or other facility with appropriate resources  Outcome: Progressing     Problem: Chronic Conditions and Co-morbidities  Goal: Patient's chronic conditions and co-morbidity symptoms are monitored and maintained or improved  Outcome: Progressing     The patient's goals for the shift include Safety    The clinical goals for the shift include Patient will be free of SOB

## 2025-01-22 ENCOUNTER — APPOINTMENT (OUTPATIENT)
Dept: RADIOLOGY | Facility: HOSPITAL | Age: 50
End: 2025-01-22

## 2025-01-22 ENCOUNTER — PHARMACY VISIT (OUTPATIENT)
Dept: PHARMACY | Facility: CLINIC | Age: 50
End: 2025-01-22
Payer: COMMERCIAL

## 2025-01-22 LAB
ALBUMIN MFR UR ELPH: 28.7 %
ALBUMIN SERPL BCP-MCNC: 4.4 G/DL (ref 3.4–5)
ALPHA1 GLOB MFR UR ELPH: 14.5 %
ALPHA2 GLOB MFR UR ELPH: 26.6 %
ANION GAP SERPL CALC-SCNC: 16 MMOL/L (ref 10–20)
B-GLOBULIN MFR UR ELPH: 21.6 %
BASOPHILS # BLD AUTO: 0.05 X10*3/UL (ref 0–0.1)
BASOPHILS NFR BLD AUTO: 0.4 %
BUN SERPL-MCNC: 22 MG/DL (ref 6–23)
CALCIUM SERPL-MCNC: 9.8 MG/DL (ref 8.6–10.6)
CHLORIDE SERPL-SCNC: 99 MMOL/L (ref 98–107)
CO2 SERPL-SCNC: 29 MMOL/L (ref 21–32)
CREAT SERPL-MCNC: 1.01 MG/DL (ref 0.5–1.05)
EGFRCR SERPLBLD CKD-EPI 2021: 68 ML/MIN/1.73M*2
EOSINOPHIL # BLD AUTO: 0.14 X10*3/UL (ref 0–0.7)
EOSINOPHIL NFR BLD AUTO: 1.2 %
ERYTHROCYTE [DISTWIDTH] IN BLOOD BY AUTOMATED COUNT: 13.6 % (ref 11.5–14.5)
GAMMA GLOB MFR UR ELPH: 8.6 %
GLUCOSE BLD MANUAL STRIP-MCNC: 169 MG/DL (ref 74–99)
GLUCOSE BLD MANUAL STRIP-MCNC: 172 MG/DL (ref 74–99)
GLUCOSE BLD MANUAL STRIP-MCNC: 187 MG/DL (ref 74–99)
GLUCOSE BLD MANUAL STRIP-MCNC: 325 MG/DL (ref 74–99)
GLUCOSE SERPL-MCNC: 150 MG/DL (ref 74–99)
HCT VFR BLD AUTO: 57.3 % (ref 36–46)
HGB BLD-MCNC: 17.1 G/DL (ref 12–16)
IMM GRANULOCYTES # BLD AUTO: 0.04 X10*3/UL (ref 0–0.7)
IMM GRANULOCYTES NFR BLD AUTO: 0.3 % (ref 0–0.9)
LYMPHOCYTES # BLD AUTO: 2.78 X10*3/UL (ref 1.2–4.8)
LYMPHOCYTES NFR BLD AUTO: 23.5 %
MAGNESIUM SERPL-MCNC: 2.63 MG/DL (ref 1.6–2.4)
MCH RBC QN AUTO: 26.1 PG (ref 26–34)
MCHC RBC AUTO-ENTMCNC: 29.8 G/DL (ref 32–36)
MCV RBC AUTO: 88 FL (ref 80–100)
MONOCYTES # BLD AUTO: 1.04 X10*3/UL (ref 0.1–1)
MONOCYTES NFR BLD AUTO: 8.8 %
NEUTROPHILS # BLD AUTO: 7.77 X10*3/UL (ref 1.2–7.7)
NEUTROPHILS NFR BLD AUTO: 65.8 %
NRBC BLD-RTO: 0 /100 WBCS (ref 0–0)
PATH REVIEW-URINE PROTEIN ELECTROPHORESIS: NORMAL
PHOSPHATE SERPL-MCNC: 4.7 MG/DL (ref 2.5–4.9)
PLATELET # BLD AUTO: 286 X10*3/UL (ref 150–450)
POTASSIUM SERPL-SCNC: 3.9 MMOL/L (ref 3.5–5.3)
RBC # BLD AUTO: 6.54 X10*6/UL (ref 4–5.2)
SODIUM SERPL-SCNC: 140 MMOL/L (ref 136–145)
URINE ELECTROPHORESIS COMMENT: NORMAL
WBC # BLD AUTO: 11.8 X10*3/UL (ref 4.4–11.3)

## 2025-01-22 PROCEDURE — 2500000004 HC RX 250 GENERAL PHARMACY W/ HCPCS (ALT 636 FOR OP/ED)

## 2025-01-22 PROCEDURE — 2500000001 HC RX 250 WO HCPCS SELF ADMINISTERED DRUGS (ALT 637 FOR MEDICARE OP)

## 2025-01-22 PROCEDURE — 78830 RP LOCLZJ TUM SPECT W/CT 1: CPT

## 2025-01-22 PROCEDURE — 82947 ASSAY GLUCOSE BLOOD QUANT: CPT

## 2025-01-22 PROCEDURE — 2500000002 HC RX 250 W HCPCS SELF ADMINISTERED DRUGS (ALT 637 FOR MEDICARE OP, ALT 636 FOR OP/ED)

## 2025-01-22 PROCEDURE — 80069 RENAL FUNCTION PANEL: CPT

## 2025-01-22 PROCEDURE — 3430000001 HC RX 343 DIAGNOSTIC RADIOPHARMACEUTICALS: Performed by: INTERNAL MEDICINE

## 2025-01-22 PROCEDURE — 78830 RP LOCLZJ TUM SPECT W/CT 1: CPT | Performed by: RADIOLOGY

## 2025-01-22 PROCEDURE — 36415 COLL VENOUS BLD VENIPUNCTURE: CPT

## 2025-01-22 PROCEDURE — 1200000002 HC GENERAL ROOM WITH TELEMETRY DAILY

## 2025-01-22 PROCEDURE — 2500000005 HC RX 250 GENERAL PHARMACY W/O HCPCS

## 2025-01-22 PROCEDURE — RXMED WILLOW AMBULATORY MEDICATION CHARGE

## 2025-01-22 PROCEDURE — A9538 TC99M PYROPHOSPHATE: HCPCS | Performed by: INTERNAL MEDICINE

## 2025-01-22 PROCEDURE — 85025 COMPLETE CBC W/AUTO DIFF WBC: CPT

## 2025-01-22 PROCEDURE — 99232 SBSQ HOSP IP/OBS MODERATE 35: CPT | Performed by: INTERNAL MEDICINE

## 2025-01-22 PROCEDURE — 83735 ASSAY OF MAGNESIUM: CPT

## 2025-01-22 RX ORDER — SPIRONOLACTONE 25 MG/1
25 TABLET ORAL DAILY
Qty: 30 TABLET | Refills: 1 | Status: SHIPPED | OUTPATIENT
Start: 2025-01-22 | End: 2025-03-23

## 2025-01-22 RX ORDER — ISOPROPYL ALCOHOL 70 ML/100ML
SWAB TOPICAL
Qty: 400 EACH | Refills: 3 | Status: SHIPPED | OUTPATIENT
Start: 2025-01-22

## 2025-01-22 RX ORDER — DEXTROSE 4 G
TABLET,CHEWABLE ORAL
Qty: 1 EACH | Refills: 0 | Status: SHIPPED | OUTPATIENT
Start: 2025-01-22

## 2025-01-22 RX ORDER — FUROSEMIDE 20 MG/1
20 TABLET ORAL DAILY PRN
Qty: 30 TABLET | Refills: 1 | Status: SHIPPED | OUTPATIENT
Start: 2025-01-22 | End: 2025-03-23

## 2025-01-22 RX ORDER — FLUTICASONE PROPIONATE AND SALMETEROL 100; 50 UG/1; UG/1
1 POWDER RESPIRATORY (INHALATION)
Qty: 60 EACH | Refills: 0 | Status: SHIPPED | OUTPATIENT
Start: 2025-01-22 | End: 2025-02-21

## 2025-01-22 RX ORDER — INSULIN GLARGINE 100 [IU]/ML
10 INJECTION, SOLUTION SUBCUTANEOUS NIGHTLY
Qty: 15 ML | Refills: 0 | Status: SHIPPED | OUTPATIENT
Start: 2025-01-22 | End: 2025-01-22 | Stop reason: HOSPADM

## 2025-01-22 RX ORDER — INSULIN GLARGINE 100 [IU]/ML
10 INJECTION, SOLUTION SUBCUTANEOUS NIGHTLY
Status: DISCONTINUED | OUTPATIENT
Start: 2025-01-22 | End: 2025-01-23 | Stop reason: HOSPADM

## 2025-01-22 RX ORDER — SACUBITRIL AND VALSARTAN 49; 51 MG/1; MG/1
1 TABLET, FILM COATED ORAL 2 TIMES DAILY
Qty: 60 TABLET | Refills: 1 | Status: SHIPPED | OUTPATIENT
Start: 2025-01-22 | End: 2025-03-23

## 2025-01-22 RX ORDER — IBUPROFEN 200 MG
CAPSULE ORAL
Qty: 400 EACH | Refills: 0 | Status: SHIPPED | OUTPATIENT
Start: 2025-01-22

## 2025-01-22 RX ORDER — MONTELUKAST SODIUM 10 MG/1
10 TABLET ORAL NIGHTLY
Qty: 30 TABLET | Refills: 0 | Status: SHIPPED | OUTPATIENT
Start: 2025-01-22 | End: 2025-02-21

## 2025-01-22 RX ORDER — ATORVASTATIN CALCIUM 40 MG/1
40 TABLET, FILM COATED ORAL NIGHTLY
Qty: 30 TABLET | Refills: 1 | Status: SHIPPED | OUTPATIENT
Start: 2025-01-22 | End: 2025-03-23

## 2025-01-22 RX ORDER — INSULIN GLARGINE 100 [IU]/ML
10 INJECTION, SOLUTION SUBCUTANEOUS NIGHTLY
Qty: 15 ML | Refills: 0 | Status: SHIPPED | OUTPATIENT
Start: 2025-01-22 | End: 2025-06-12

## 2025-01-22 RX ORDER — LANCETS 33 GAUGE
400 EACH MISCELLANEOUS
Qty: 100 EACH | Refills: 0 | Status: SHIPPED | OUTPATIENT
Start: 2025-01-22 | End: 2025-02-21

## 2025-01-22 RX ORDER — CARVEDILOL 6.25 MG/1
6.25 TABLET ORAL 2 TIMES DAILY
Qty: 60 TABLET | Refills: 1 | Status: SHIPPED | OUTPATIENT
Start: 2025-01-22 | End: 2025-03-23

## 2025-01-22 RX ORDER — CIPROFLOXACIN HYDROCHLORIDE 3 MG/ML
1 SOLUTION/ DROPS OPHTHALMIC EVERY 4 HOURS
Qty: 5 ML | Refills: 0 | Status: SHIPPED | OUTPATIENT
Start: 2025-01-22 | End: 2025-02-08

## 2025-01-22 RX ORDER — INSULIN GLARGINE-YFGN 100 [IU]/ML
10 INJECTION, SOLUTION SUBCUTANEOUS EVERY MORNING
Qty: 3 ML | Refills: 0 | Status: SHIPPED | OUTPATIENT
Start: 2025-01-22 | End: 2025-01-22 | Stop reason: HOSPADM

## 2025-01-22 RX ORDER — PEN NEEDLE, DIABETIC 30 GX3/16"
1 NEEDLE, DISPOSABLE MISCELLANEOUS DAILY
Qty: 100 EACH | Refills: 0 | Status: SHIPPED | OUTPATIENT
Start: 2025-01-22 | End: 2025-05-02

## 2025-01-22 RX ADMIN — TECHNETIUM TC 99M PYROPHOSPHATE 22 MILLICURIE: 11.9; 3.2 INJECTION, POWDER, LYOPHILIZED, FOR SOLUTION INTRAVENOUS at 08:44

## 2025-01-22 RX ADMIN — CIPROFLOXACIN 1 DROP: 3 SOLUTION OPHTHALMIC at 14:42

## 2025-01-22 RX ADMIN — MONTELUKAST 10 MG: 10 TABLET, FILM COATED ORAL at 20:31

## 2025-01-22 RX ADMIN — EMPAGLIFLOZIN 10 MG: 10 TABLET, FILM COATED ORAL at 08:17

## 2025-01-22 RX ADMIN — PREDNISONE 5 MG: 5 TABLET ORAL at 08:18

## 2025-01-22 RX ADMIN — CIPROFLOXACIN 1 DROP: 3 SOLUTION OPHTHALMIC at 22:08

## 2025-01-22 RX ADMIN — SACUBITRIL AND VALSARTAN 2 TABLET: 24; 26 TABLET, FILM COATED ORAL at 20:31

## 2025-01-22 RX ADMIN — CARVEDILOL 6.25 MG: 6.25 TABLET, FILM COATED ORAL at 20:31

## 2025-01-22 RX ADMIN — ATORVASTATIN CALCIUM 40 MG: 40 TABLET, FILM COATED ORAL at 20:30

## 2025-01-22 RX ADMIN — Medication 1 L/MIN: at 08:10

## 2025-01-22 RX ADMIN — CIPROFLOXACIN 1 DROP: 3 SOLUTION OPHTHALMIC at 17:38

## 2025-01-22 RX ADMIN — SACUBITRIL AND VALSARTAN 2 TABLET: 24; 26 TABLET, FILM COATED ORAL at 08:16

## 2025-01-22 RX ADMIN — ENOXAPARIN SODIUM 40 MG: 100 INJECTION SUBCUTANEOUS at 08:15

## 2025-01-22 RX ADMIN — INSULIN LISPRO 1 UNITS: 100 INJECTION, SOLUTION INTRAVENOUS; SUBCUTANEOUS at 16:00

## 2025-01-22 RX ADMIN — INSULIN GLARGINE 10 UNITS: 100 INJECTION, SOLUTION SUBCUTANEOUS at 22:13

## 2025-01-22 RX ADMIN — INSULIN LISPRO 4 UNITS: 100 INJECTION, SOLUTION INTRAVENOUS; SUBCUTANEOUS at 11:43

## 2025-01-22 RX ADMIN — ENOXAPARIN SODIUM 40 MG: 100 INJECTION SUBCUTANEOUS at 20:31

## 2025-01-22 RX ADMIN — HYDRALAZINE HYDROCHLORIDE 10 MG: 10 TABLET ORAL at 08:16

## 2025-01-22 RX ADMIN — SPIRONOLACTONE 25 MG: 25 TABLET, FILM COATED ORAL at 08:16

## 2025-01-22 RX ADMIN — CIPROFLOXACIN 1 DROP: 3 SOLUTION OPHTHALMIC at 03:02

## 2025-01-22 RX ADMIN — CARVEDILOL 6.25 MG: 6.25 TABLET, FILM COATED ORAL at 08:16

## 2025-01-22 RX ADMIN — INSULIN LISPRO 1 UNITS: 100 INJECTION, SOLUTION INTRAVENOUS; SUBCUTANEOUS at 08:14

## 2025-01-22 RX ADMIN — Medication 6 MG: at 20:30

## 2025-01-22 RX ADMIN — CIPROFLOXACIN 1 DROP: 3 SOLUTION OPHTHALMIC at 06:23

## 2025-01-22 ASSESSMENT — COGNITIVE AND FUNCTIONAL STATUS - GENERAL
DAILY ACTIVITIY SCORE: 24
MOBILITY SCORE: 24
MOBILITY SCORE: 24
DAILY ACTIVITIY SCORE: 24

## 2025-01-22 ASSESSMENT — PAIN SCALES - GENERAL
PAINLEVEL_OUTOF10: 0 - NO PAIN
PAINLEVEL_OUTOF10: 0 - NO PAIN

## 2025-01-22 ASSESSMENT — PAIN - FUNCTIONAL ASSESSMENT: PAIN_FUNCTIONAL_ASSESSMENT: 0-10

## 2025-01-22 NOTE — PROGRESS NOTES
"  Internal Medicine Progress Note     Samina Park is a 49 y.o. female with a past medical history of uncontrolled HTN and asthma admitted on 1/16/2025 with acute decompensated heart failure iso newly diagnosed HFrEF.     Subjective     Overnight: NAEON.     This morning she feels well without any concerns. She states that her breathing is at baseline, she can lie flat, and has not had any difficulty ambulating.     Medications     Medications:   Scheduled medications  atorvastatin, 40 mg, oral, Nightly  carvedilol, 6.25 mg, oral, BID  ciprofloxacin, 1 drop, Left Eye, q4h  empagliflozin, 10 mg, oral, Daily  enoxaparin, 40 mg, subcutaneous, q12h BALA  [Held by provider] furosemide, 20 mg, oral, Daily  insulin glargine, 10 Units, subcutaneous, Nightly  insulin lispro, 0-5 Units, subcutaneous, TID AC  melatonin, 6 mg, oral, Daily  montelukast, 10 mg, oral, Nightly  oxygen, , inhalation, Continuous - Inhalation  predniSONE, 5 mg, oral, Daily  sacubitriL-valsartan, 2 tablet, oral, BID  spironolactone, 25 mg, oral, Daily      Continuous medications     PRN medications  PRN medications: acetaminophen, albuterol, dextrose, dextrose, glucagon, glucagon, hydrALAZINE     Objective     Vitals  Visit Vitals  BP 90/57   Pulse 88   Temp 36 °C (96.8 °F)   Resp 18   Ht 1.499 m (4' 11.02\")   Wt 97.9 kg (215 lb 13.3 oz)   SpO2 94%   BMI 43.57 kg/m²   Smoking Status Never   BSA 2.02 m²       Intake/Output Summary (Last 24 hours) at 1/22/2025 1348  Last data filed at 1/22/2025 1145  Gross per 24 hour   Intake --   Output 1800 ml   Net -1800 ml       Physical Exam  Constitutional: Alert, lying in bed (flat), no acute distress  HEENT: normocephalic, atraumatic, conjunctiva normal   Respiratory: effort normal and appropriate, breath sounds heard in all fields, clear to auscultation bilaterally  Cardiovascular: RRR, s1 and s2 noted, no murmurs, rub, or gallops appreciated, distal pulses 2+ throughout, +1 BLE to the mid shin  Abdominal: flat, " soft, non-tender, normal bowel sounds   MSK/Derm: skin warm and dry, muscle tone and strength appropriate  Neurological: at baseline, AOx4  Psych: Appropriate mood and behavior    Labs  Lab Results   Component Value Date    WBC 11.8 (H) 01/22/2025    HGB 17.1 (H) 01/22/2025    HCT 57.3 (H) 01/22/2025    MCV 88 01/22/2025     01/22/2025      Lab Results   Component Value Date    GLUCOSE 150 (H) 01/22/2025    CALCIUM 9.8 01/22/2025     01/22/2025    K 3.9 01/22/2025    CO2 29 01/22/2025    CL 99 01/22/2025    BUN 22 01/22/2025    CREATININE 1.01 01/22/2025      Lab Results   Component Value Date    ALT 23 01/17/2025    AST 18 01/17/2025    ALKPHOS 71 01/17/2025    BILITOT 1.1 01/17/2025        Imaging    Cardiac MRI (01/20/25):   IMPRESSION:  1.  Normal LV size (EDVi 92 ml/m2) with severely reduced systolic  function (LVEF 26%).  2.  Global hypokinesis with minor regional variation.  3.  Mild LVH (1.2-1.3 cm, septum)  4. Regionally elevated myocardial T2 values suggestive of possible  myocardial edema/inflammation.  5.  Increased ECV 32%, suggestive of diffuse myocardial fibrosis vs  edema.  6.  LGE imaging reveals diffuse global enhancement suggestive of  diffuse fibrosis vs infiltration/amyloid.  7.  Moderately dilated RV with moderately impaired systolic function  (RVEF 32%).  8.  Dilated pulmonary arteries. Correlate/concern for elevated  pulmonary pressures.  9.  Moderate pericardial effusion with the largest pocket around the  left ventricle measuring 1.4 cm. Normal pericardial thickness. No  evidence of respirophasic septal shift or RV/RA collapse to suggest  tamponade.  10. No evidence of significant intracardiac shunt (Qp:Qs 0.94).      The CMR demonstrates non-ischemic cardiomyopathy with severe LV  systolic dysfunction and diffuse LGE with focally elevated T2 values.  Differentials include but are not limited to myocardial  infiltration/amyloid vs myocarditis/inflammatory  cardiomyopathies.  Clinical correlation recommended.       Assessment/Plan   Samina Park is a 49-year-old female with PMH significant for HTN, HLD, obesity, asthma, depression. Patient presented as a transfer from [facility] where she presented with acute on chronic dyspnea worsening over the last few months which she initially attributed to poorly controlled asthma. Treated with several courses of glucocorticoids with no improvement and had recently been taking prescribed by urgent care earlier this week. She was found to be in acute decompensated heart failure with new HFrEF based on TTE findings here at [facility]. Now undergoing aggressive diuresis with IV Lasix and a course of Diamox started (1/17-19) with improving clinical status. Cardiac MRI concerning for infiltrative process, planning to r/o amyloidosis.     #New ADHF, HFrEF NYHA Class III  #C/f pericardial effusion  #Sinus tachycardia  #Anasarca  #HTN  :: CT PE c/f large pericardial effusion, though noted as small on TTE  :: Cardiac MRI showing non-ischemic cardiomyopathy with severe LV dysfunction and findings c/f myocardial infiltration/amyloidosis  :: TTE on presentation showing EF 35-40%, abnormal LV diastolic filling, moderately dilated LA, small pericardial effusion   ::   :: TSH wnl   :: hemoglobin A1c 8.2%  :: UDS -ve  :: UA w/out massive proteinuria to suggest renal etiology   :: HIV: non-reactive  Plan:  - Entresto 50 mg BID   - carvedilol 6.25 mg BID   - Jardiance 10 mg every day  - Spironolactone 25 mg every day   - Lasix 20 mg PO daily as needed for swelling and weight gain  - Pending SPEP and UPEP w/ immunofixation   - NM PYP cardiac amyloidosis w/ SPECT CT today   - Could benefit from GLP-1, F/U outpatient    #HTN  #DLD  : :Tchol 155, HDL 27, LDL 92, Trig 183   : :hemogloboin A1c 8.2%  : :ASCVD score: 7.4% risk of cardiovascular event in next 10 years  Plan:  -Atorvastatin 40 mg daily     #Leukocytosis   :: likely due to outpatient  steroid course for misdiagnosis of asthma exacerbation   :: not meeting SIRS criteria, no possible source  Plan:  -prednisone taper  -continue to trend CBC  -monitor for infectious signs/symptoms    #Asthma  #Chronic Hypercapnic RF  #c/f OHS and/or DARELL  #Pulmonary Nodule Right 3mm, Never smoker  :: home meds- albuterol inhaler and nebs PRN  :: VBG showing CO2 75, likely has retention at baseline  :: elevated bicarb likely compensation   :: unable to see PFTs in EMR  Plan:  - continue albuterol nebs for now  - Goal Bicarb 38-40 per Pulm iso diuresis  - No plans for CPAP/BiPAP while inpatient   - outpatient Pulm: sleep study & PFTs   - discharge with LABA/ICS prn    #Depression  -continue home cymbalta     F: Avoiding in ADHF   E: PRN,    N: Cardiac (NPO MN)  A: pIV  DVT prophylaxis: Lovenox  Code status: Full code confirmed on admission  NOK: HARMEET Jenkins [phone]   ---  James Troy MD   Internal Medicine/Pediatrics PGY-1

## 2025-01-22 NOTE — CARE PLAN
The patient's goals for the shift include Safety    The clinical goals for the shift include pt will remain HDS throughout the shift    Over the shift, the patient did make progress toward the following goals. Pt also learned how to give herself insulin shots in her ABD.     Problem: Skin  Goal: Decreased wound size/increased tissue granulation at next dressing change  Outcome: Progressing  Flowsheets (Taken 1/17/2025 0022 by Madie Soto RN)  Decreased wound size/increased tissue granulation at next dressing change: Promote sleep for wound healing  Goal: Participates in plan/prevention/treatment measures  Outcome: Progressing  Flowsheets (Taken 1/22/2025 1723)  Participates in plan/prevention/treatment measures: Elevate heels  Goal: Prevent/manage excess moisture  Outcome: Progressing  Flowsheets (Taken 1/22/2025 1723)  Prevent/manage excess moisture:   Moisturize dry skin   Follow provider orders for dressing changes  Goal: Prevent/minimize sheer/friction injuries  Outcome: Progressing  Flowsheets (Taken 1/22/2025 1723)  Prevent/minimize sheer/friction injuries: Use pull sheet  Goal: Promote/optimize nutrition  Outcome: Progressing  Flowsheets (Taken 1/22/2025 1723)  Promote/optimize nutrition: Monitor/record intake including meals  Goal: Promote skin healing  Outcome: Progressing  Flowsheets (Taken 1/22/2025 1723)  Promote skin healing: Assess skin/pad under line(s)/device(s)     Problem: Safety - Adult  Goal: Free from fall injury  Outcome: Progressing     Problem: Discharge Planning  Goal: Discharge to home or other facility with appropriate resources  Outcome: Progressing     Problem: Chronic Conditions and Co-morbidities  Goal: Patient's chronic conditions and co-morbidity symptoms are monitored and maintained or improved  Outcome: Progressing

## 2025-01-22 NOTE — PROGRESS NOTES
[facility] notified that patient's pending Medicaid ## [id number]. [facility] will continue to follow for discharge planning.      TAMMIE Ayala, RN  [facility], [facility]  Transitional Care Coordinator, Mon-Fri  Cell: [phone], Office: [phone]  Email: [email or web address]

## 2025-01-22 NOTE — NURSING NOTE
Ms. Park is sitting at bedside.  States she is feeling much better -- no longer O2 dependent.  We reviewed current regimen -- she is hoping to not require insulin daily at time of discharge.  We reviewed BG values.  She is comfortable with GLP-1 if needed as outpatient.  Will follow up in am or Friday.  Time spent:  15 mins.

## 2025-01-22 NOTE — CARE PLAN
Problem: Skin  Goal: Decreased wound size/increased tissue granulation at next dressing change  Outcome: Progressing  Goal: Participates in plan/prevention/treatment measures  Outcome: Progressing  Goal: Prevent/manage excess moisture  Outcome: Progressing  Goal: Prevent/minimize sheer/friction injuries  Outcome: Progressing  Goal: Promote/optimize nutrition  Outcome: Progressing  Goal: Promote skin healing  Outcome: Progressing     Problem: Safety - Adult  Goal: Free from fall injury  Outcome: Progressing   The patient's goals for the shift include Safety    The clinical goals for the shift include pt will remain HDS and safe this shift    Over the shift, the patient did make progress toward the following goals. Pt denies any pain or discomfort, ambulates in room. Will continue to monitor.

## 2025-01-22 NOTE — DISCHARGE INSTRUCTIONS
Dear Samina Park,    You were admitted to [facility] for evaluation and management of shortness of breath. While being worked-up for the cause of your shortness of breath, it was discovered that you have heart failure meaning that your heart does not work as efficiently as it once might have. We investigated the cause of your heart failure with a cardiac MRI which showed concern for something called fibrosis, or stiffening of your heart muscle. Further investigation was negative for transthyretin cardiac amyloidosis, which is a disease that may cause these findings in the heart muscle. One laboratory test (serum protein electrophoresis) was performed to rule out light chain amyloidosis, which is another form of the disease. The results were still pending at the time of discharge, and we will call you once these are available. You will also be notified via OYE!.     You also had elevated blood glucose while in the hospital. Your A1c which measures the average glucose in your blood over several months, was 8.2%, which is high, and you are now diagnosed with Type 2 Diabetes Mellitus. We started you on a long acting (once a day) insulin that you should take at home. We recommend following up with an endocrinologist and primary care doctor. Please measure your blood glucose 4 times per day and record it so that your doctors can change your insulin regimen. We recommend you discuss starting a medication called GLP-1 (such as Ozempic, Trulicity, Mounjaro) as this can help with both your diabetes and your heart disease.     Thank you for choosing  for your care,   It was a pleasure taking care of you,     Please follow-up with the following providers:   - Cardiology; appointment with Dr. Nugent on 02/24/2025  - Pulmonology and sleep medicine follow-up regarding sleep study and asthma; referral sent   - Endocrinology regarding benefit from GLP-1 agonists and Type 2 Diabetes Mellitus   - PCP regarding hospitalization

## 2025-01-23 ENCOUNTER — PHARMACY VISIT (OUTPATIENT)
Dept: PHARMACY | Facility: CLINIC | Age: 50
End: 2025-01-23

## 2025-01-23 VITALS
OXYGEN SATURATION: 94 % | HEIGHT: 59 IN | WEIGHT: 215.83 LBS | RESPIRATION RATE: 18 BRPM | HEART RATE: 90 BPM | TEMPERATURE: 96.8 F | DIASTOLIC BLOOD PRESSURE: 63 MMHG | SYSTOLIC BLOOD PRESSURE: 96 MMHG | BODY MASS INDEX: 43.51 KG/M2

## 2025-01-23 LAB
ALBUMIN SERPL BCP-MCNC: 4.2 G/DL (ref 3.4–5)
ANION GAP SERPL CALC-SCNC: 12 MMOL/L (ref 10–20)
BASOPHILS # BLD AUTO: 0.1 X10*3/UL (ref 0–0.1)
BASOPHILS NFR BLD AUTO: 0.8 %
BUN SERPL-MCNC: 22 MG/DL (ref 6–23)
CALCIUM SERPL-MCNC: 9.1 MG/DL (ref 8.6–10.6)
CHLORIDE SERPL-SCNC: 102 MMOL/L (ref 98–107)
CO2 SERPL-SCNC: 27 MMOL/L (ref 21–32)
CREAT SERPL-MCNC: 0.91 MG/DL (ref 0.5–1.05)
EGFRCR SERPLBLD CKD-EPI 2021: 77 ML/MIN/1.73M*2
EOSINOPHIL # BLD AUTO: 0.19 X10*3/UL (ref 0–0.7)
EOSINOPHIL NFR BLD AUTO: 1.6 %
ERYTHROCYTE [DISTWIDTH] IN BLOOD BY AUTOMATED COUNT: 13.7 % (ref 11.5–14.5)
GLUCOSE BLD MANUAL STRIP-MCNC: 136 MG/DL (ref 74–99)
GLUCOSE SERPL-MCNC: 123 MG/DL (ref 74–99)
HCT VFR BLD AUTO: 58 % (ref 36–46)
HGB BLD-MCNC: 17.2 G/DL (ref 12–16)
IMM GRANULOCYTES # BLD AUTO: 0.05 X10*3/UL (ref 0–0.7)
IMM GRANULOCYTES NFR BLD AUTO: 0.4 % (ref 0–0.9)
LYMPHOCYTES # BLD AUTO: 3.05 X10*3/UL (ref 1.2–4.8)
LYMPHOCYTES NFR BLD AUTO: 25.8 %
MAGNESIUM SERPL-MCNC: 2.58 MG/DL (ref 1.6–2.4)
MCH RBC QN AUTO: 26.8 PG (ref 26–34)
MCHC RBC AUTO-ENTMCNC: 29.7 G/DL (ref 32–36)
MCV RBC AUTO: 91 FL (ref 80–100)
MONOCYTES # BLD AUTO: 1.17 X10*3/UL (ref 0.1–1)
MONOCYTES NFR BLD AUTO: 9.9 %
NEUTROPHILS # BLD AUTO: 7.25 X10*3/UL (ref 1.2–7.7)
NEUTROPHILS NFR BLD AUTO: 61.5 %
NRBC BLD-RTO: 0 /100 WBCS (ref 0–0)
PHOSPHATE SERPL-MCNC: 4.1 MG/DL (ref 2.5–4.9)
PLATELET # BLD AUTO: 297 X10*3/UL (ref 150–450)
POTASSIUM SERPL-SCNC: 3.9 MMOL/L (ref 3.5–5.3)
RBC # BLD AUTO: 6.41 X10*6/UL (ref 4–5.2)
SODIUM SERPL-SCNC: 137 MMOL/L (ref 136–145)
WBC # BLD AUTO: 11.8 X10*3/UL (ref 4.4–11.3)

## 2025-01-23 PROCEDURE — 2500000002 HC RX 250 W HCPCS SELF ADMINISTERED DRUGS (ALT 637 FOR MEDICARE OP, ALT 636 FOR OP/ED)

## 2025-01-23 PROCEDURE — 80069 RENAL FUNCTION PANEL: CPT

## 2025-01-23 PROCEDURE — 36415 COLL VENOUS BLD VENIPUNCTURE: CPT

## 2025-01-23 PROCEDURE — 2500000001 HC RX 250 WO HCPCS SELF ADMINISTERED DRUGS (ALT 637 FOR MEDICARE OP)

## 2025-01-23 PROCEDURE — 2500000004 HC RX 250 GENERAL PHARMACY W/ HCPCS (ALT 636 FOR OP/ED)

## 2025-01-23 PROCEDURE — 97530 THERAPEUTIC ACTIVITIES: CPT | Mod: GP,CQ

## 2025-01-23 PROCEDURE — 82947 ASSAY GLUCOSE BLOOD QUANT: CPT

## 2025-01-23 PROCEDURE — 99239 HOSP IP/OBS DSCHRG MGMT >30: CPT

## 2025-01-23 PROCEDURE — 85025 COMPLETE CBC W/AUTO DIFF WBC: CPT

## 2025-01-23 PROCEDURE — 83735 ASSAY OF MAGNESIUM: CPT

## 2025-01-23 RX ADMIN — CIPROFLOXACIN 1 DROP: 3 SOLUTION OPHTHALMIC at 09:29

## 2025-01-23 RX ADMIN — ENOXAPARIN SODIUM 40 MG: 100 INJECTION SUBCUTANEOUS at 09:29

## 2025-01-23 RX ADMIN — CIPROFLOXACIN 1 DROP: 3 SOLUTION OPHTHALMIC at 03:00

## 2025-01-23 RX ADMIN — SACUBITRIL AND VALSARTAN 2 TABLET: 24; 26 TABLET, FILM COATED ORAL at 09:29

## 2025-01-23 RX ADMIN — PREDNISONE 5 MG: 5 TABLET ORAL at 09:28

## 2025-01-23 RX ADMIN — EMPAGLIFLOZIN 10 MG: 10 TABLET, FILM COATED ORAL at 09:29

## 2025-01-23 RX ADMIN — CARVEDILOL 6.25 MG: 6.25 TABLET, FILM COATED ORAL at 09:29

## 2025-01-23 RX ADMIN — CIPROFLOXACIN 1 DROP: 3 SOLUTION OPHTHALMIC at 06:41

## 2025-01-23 RX ADMIN — SPIRONOLACTONE 25 MG: 25 TABLET, FILM COATED ORAL at 09:28

## 2025-01-23 ASSESSMENT — COGNITIVE AND FUNCTIONAL STATUS - GENERAL
TURNING FROM BACK TO SIDE WHILE IN FLAT BAD: A LITTLE
MOVING FROM LYING ON BACK TO SITTING ON SIDE OF FLAT BED WITH BEDRAILS: A LITTLE
WALKING IN HOSPITAL ROOM: A LITTLE
CLIMB 3 TO 5 STEPS WITH RAILING: A LITTLE
MOBILITY SCORE: 19
MOVING TO AND FROM BED TO CHAIR: A LITTLE

## 2025-01-23 ASSESSMENT — PAIN - FUNCTIONAL ASSESSMENT: PAIN_FUNCTIONAL_ASSESSMENT: 0-10

## 2025-01-23 ASSESSMENT — PAIN SCALES - GENERAL: PAINLEVEL_OUTOF10: 0 - NO PAIN

## 2025-01-23 NOTE — PROGRESS NOTES
"  Internal Medicine Progress Note     Samina Park is a 49 y.o. female with a past medical history of uncontrolled HTN and asthma admitted on 1/16/2025 with acute decompensated heart failure iso newly diagnosed HFrEF.     Subjective     Overnight: NAEON.     This morning she feels well without any concerns. She states that her breathing is at baseline, she can lie flat, and has not had any difficulty ambulating.     Medications     Medications:   Scheduled medications  atorvastatin, 40 mg, oral, Nightly  carvedilol, 6.25 mg, oral, BID  ciprofloxacin, 1 drop, Left Eye, q4h  empagliflozin, 10 mg, oral, Daily  enoxaparin, 40 mg, subcutaneous, q12h BALA  [Held by provider] furosemide, 20 mg, oral, Daily  insulin glargine, 10 Units, subcutaneous, Nightly  insulin lispro, 0-5 Units, subcutaneous, TID AC  melatonin, 6 mg, oral, Daily  montelukast, 10 mg, oral, Nightly  oxygen, , inhalation, Continuous - Inhalation  predniSONE, 5 mg, oral, Daily  sacubitriL-valsartan, 2 tablet, oral, BID  spironolactone, 25 mg, oral, Daily      Continuous medications     PRN medications  PRN medications: acetaminophen, albuterol, dextrose, dextrose, glucagon, glucagon, hydrALAZINE     Objective     Vitals  Visit Vitals  BP 76/51 (BP Location: Left arm, Patient Position: Lying)   Pulse 85   Temp 36 °C (96.8 °F) (Temporal)   Resp 19   Ht 1.499 m (4' 11.02\")   Wt 97.9 kg (215 lb 13.3 oz)   SpO2 94%   BMI 43.57 kg/m²   Smoking Status Never   BSA 2.02 m²       Intake/Output Summary (Last 24 hours) at 1/23/2025 0845  Last data filed at 1/22/2025 2108  Gross per 24 hour   Intake --   Output 850 ml   Net -850 ml       Physical Exam  Constitutional: Alert, lying in bed (flat), no acute distress  HEENT: normocephalic, atraumatic, conjunctiva normal   Respiratory: effort normal and appropriate, breath sounds heard in all fields, clear to auscultation bilaterally  Cardiovascular: RRR, s1 and s2 noted, no murmurs, rub, or gallops appreciated, distal " pulses 2+ throughout, +1 BLE to the mid shin  Abdominal: flat, soft, non-tender, normal bowel sounds   MSK/Derm: skin warm and dry, muscle tone and strength appropriate  Neurological: at baseline, AOx4  Psych: Appropriate mood and behavior    Labs  Lab Results   Component Value Date    WBC 11.8 (H) 01/23/2025    HGB 17.2 (H) 01/23/2025    HCT 58.0 (H) 01/23/2025    MCV 91 01/23/2025     01/23/2025      Lab Results   Component Value Date    GLUCOSE 150 (H) 01/22/2025    CALCIUM 9.8 01/22/2025     01/22/2025    K 3.9 01/22/2025    CO2 29 01/22/2025    CL 99 01/22/2025    BUN 22 01/22/2025    CREATININE 1.01 01/22/2025      Lab Results   Component Value Date    ALT 23 01/17/2025    AST 18 01/17/2025    ALKPHOS 71 01/17/2025    BILITOT 1.1 01/17/2025        Imaging    Cardiac MRI (01/20/25):   IMPRESSION:  1.  Normal LV size (EDVi 92 ml/m2) with severely reduced systolic  function (LVEF 26%).  2.  Global hypokinesis with minor regional variation.  3.  Mild LVH (1.2-1.3 cm, septum)  4. Regionally elevated myocardial T2 values suggestive of possible  myocardial edema/inflammation.  5.  Increased ECV 32%, suggestive of diffuse myocardial fibrosis vs  edema.  6.  LGE imaging reveals diffuse global enhancement suggestive of  diffuse fibrosis vs infiltration/amyloid.  7.  Moderately dilated RV with moderately impaired systolic function  (RVEF 32%).  8.  Dilated pulmonary arteries. Correlate/concern for elevated  pulmonary pressures.  9.  Moderate pericardial effusion with the largest pocket around the  left ventricle measuring 1.4 cm. Normal pericardial thickness. No  evidence of respirophasic septal shift or RV/RA collapse to suggest  tamponade.  10. No evidence of significant intracardiac shunt (Qp:Qs 0.94).      The CMR demonstrates non-ischemic cardiomyopathy with severe LV  systolic dysfunction and diffuse LGE with focally elevated T2 values.  Differentials include but are not limited to  myocardial  infiltration/amyloid vs myocarditis/inflammatory cardiomyopathies.  Clinical correlation recommended.       Assessment/Plan   Samina Park is a 49-year-old female with PMH significant for HTN, HLD, obesity, asthma, depression. Patient presented as a transfer from [facility] where she presented with acute on chronic dyspnea worsening over the last few months which she initially attributed to poorly controlled asthma. Treated with several courses of glucocorticoids with no improvement and had recently been taking prescribed by urgent care earlier this week. She was found to be in acute decompensated heart failure with new HFrEF based on TTE findings [facility] at [facility]. Now undergoing aggressive diuresis with IV Lasix and a course of Diamox started (1/17-19) with improving clinical status. Cardiac MRI concerning for infiltrative process, planning to r/o amyloidosis.     #New ADHF, HFrEF NYHA Class III  #C/f pericardial effusion  #Sinus tachycardia  #Anasarca  #HTN  :: CT PE c/f large pericardial effusion, though noted as small on TTE  :: Cardiac MRI showing non-ischemic cardiomyopathy with severe LV dysfunction and findings c/f myocardial infiltration/amyloidosis  :: TTE on presentation showing EF 35-40%, abnormal LV diastolic filling, moderately dilated LA, small pericardial effusion   ::   :: TSH wnl   :: hemoglobin A1c 8.2%  :: UDS -ve  :: UA w/out massive proteinuria to suggest renal etiology   :: HIV: non-reactive  Plan:  - Entresto 50 mg BID   - carvedilol 6.25 mg BID   - Jardiance 10 mg every day  - Spironolactone 25 mg every day   - Lasix 20 mg PO daily as needed for swelling and weight gain  - Pending SPEP and UPEP w/ immunofixation   - NM PYP cardiac amyloidosis negative for TTR Amyloidosis  - Could benefit from GLP-1, F/U outpatient    #HTN  #DLD  : :Tchol 155, HDL 27, LDL 92, Trig 183   : :hemogloboin A1c 8.2%  : :ASCVD score: 7.4% risk of cardiovascular event in next 10  years  Plan:  -Atorvastatin 40 mg daily     #Leukocytosis   :: likely due to outpatient steroid course for misdiagnosis of asthma exacerbation   :: not meeting SIRS criteria, no possible source  Plan:  -prednisone taper  -continue to trend CBC  -monitor for infectious signs/symptoms    #Asthma  #Chronic Hypercapnic RF  #c/f OHS and/or DARELL  #Pulmonary Nodule Right 3mm, Never smoker  :: home meds- albuterol inhaler and nebs PRN  :: VBG showing CO2 75, likely has retention at baseline  :: elevated bicarb likely compensation   :: unable to see PFTs in EMR  Plan:  - continue albuterol nebs for now  - Goal Bicarb 38-40 per Pulm iso diuresis  - No plans for CPAP/BiPAP while inpatient   - outpatient Pulm: sleep study & PFTs   - discharge with LABA/ICS prn    #Depression  -continue home cymbalta     F: Avoiding in ADHF   E: PRN,    N: Cardiac (NPO MN)  A: pIV  DVT prophylaxis: Lovenox  Code status: Full code confirmed on admission  NOK: HARMEET Jenkins [phone]   ---  Johnathan Arias MD   Internal Medicine/Pediatrics PGY-1

## 2025-01-23 NOTE — PROGRESS NOTES
Physical Therapy    Physical Therapy Treatment    Patient Name: Samina Park  MRN: [id number]  Department: [place] [address]  Room: 50685068-A  Today's Date: 1/23/2025  Time Calculation  Start Time: 1039  Stop Time: 1051  Time Calculation (min): 12 min         Assessment/Plan   PT Assessment  Rehab Prognosis: Good  Barriers to Discharge Home: No anticipated barriers  Evaluation/Treatment Tolerance: Patient tolerated treatment well  End of Session Communication: Bedside nurse  Assessment Comment: Pt. preparing to go home today with support from family. Pt. able to demonstrate amb. without assistive device and supervision. Pt. able to negotiate steps with supervision. Pt. tolerated session well with SP02 and HR remaining WNL thru out. (No further PT needed post acute.)  End of Session Patient Position: Up in chair, Alarm off, not on at start of session  PT Plan  Inpatient/Swing Bed or Outpatient: Inpatient  PT Plan  Treatment/Interventions: Gait training, Transfer training, Stair training  PT Plan: Ongoing PT  PT Frequency: 3 times per week  PT Discharge Recommendations: No PT needed after discharge  Equipment Recommended upon Discharge:  (none)  PT Recommended Transfer Status: Stand by assist      General Visit Information:   PT  Visit  PT Received On: 01/23/25  General  Reason for Referral: Acute on chronic dyspnea  Past Medical History Relevant to Rehab: HTN, asthma, and depression  Family/Caregiver Present: No  Prior to Session Communication: Bedside nurse  Patient Position Received: Up in room  General Comment: Pt. is pleasant cooperative and motivated. Pt. reports that she is leaving today and feeling much better than admission. Pt. on room air.    Subjective   Precautions:  Precautions  Hearing/Visual Limitations: Hearing and vision WFL     Date/Time Vitals Session Patient Position Pulse Resp SpO2 BP MAP (mmHg)    01/23/25 1039 --  --  102  --  96 %  --  --                 Objective   Pain:  Pain Assessment  Pain  Assessment: 0-10  0-10 (Numeric) Pain Score: 0 - No pain  Cognition:  Cognition  Overall Cognitive Status: Within Functional Limits  Orientation Level: Oriented X4  Following Commands: Follows all commands and directions without difficulty  Safety Judgment: Good awareness of safety precautions  Coordination:  Movements are Fluid and Coordinated: Yes  Postural Control:  Postural Control  Postural Control: Within Functional Limits  Static Sitting Balance  Static Sitting-Balance Support: No upper extremity supported, Feet supported  Static Sitting-Level of Assistance: Independent  Static Standing Balance  Static Standing-Balance Support: No upper extremity supported  Static Standing-Level of Assistance:  (independent.)  Extremity/Trunk Assessments:                      Activity Tolerance:  Activity Tolerance  Endurance: Endurance does not limit participation in activity  Treatments:  Ambulation/Gait Training  Ambulation/Gait Training Performed: Yes  Ambulation/Gait Training 1  Surface 1:  (Pt. amb. 200' no device and supervision)  Transfers  Transfer: Yes  Transfer 1  Transfer From 1: Sit to  Transfer to 1: Stand  Transfer Level of Assistance 1:  (Independent)  Transfers 2  Transfer From 2: Stand to  Transfer to 2: Sit  Transfer Level of Assistance 2: Independent    Stairs  Stairs: Yes  Stairs  Rails 1:  (After demonstration, Pt. ascended and descended 6 steps with rail on right ascending and left descending with sba Non reciprocal as instructed.)    Outcome Measures:  [facility] Basic Mobility  Turning from your back to your side while in a flat bed without using bedrails: A little  Moving from lying on your back to sitting on the side of a flat bed without using bedrails: A little  Moving to and from bed to chair (including a wheelchair): A little  Standing up from a chair using your arms (e.g. wheelchair or bedside chair): None  To walk in hospital room: A little  Climbing 3-5 steps with railing: A little  Basic Mobility  - Total Score: 19    Education Documentation  Mobility Training, taught by Dilcia Carroll PTA at 1/23/2025 11:02 AM.  Learner: Patient  Readiness: Acceptance  Method: Explanation, Demonstration  Response: Needs Reinforcement    Education Comments  No comments found.        OP EDUCATION:       Encounter Problems       Encounter Problems (Active)       Balance       STG - Maintains independent dynamic standing balance without upper extremity support (Progressing)       Start:  01/16/25    Expected End:  01/30/25            STG - Maintains independent static standing balance without upper extremity support. (Progressing)       Start:  01/16/25    Expected End:  01/30/25               Mobility       STG - Patient will ambulate 125ft, independently with no device. (Progressing)       Start:  01/16/25    Expected End:  01/30/25            STG - Patient will ascend and descend a flight of stairs, independently using one rail. (Progressing)       Start:  01/16/25    Expected End:  01/30/25               PT Transfers       STG - Transfer from bed to chair, independently with no device. (Progressing)       Start:  01/16/25    Expected End:  01/30/25            STG - Patient to transfer to and from sit to supine, independently. (Progressing)       Start:  01/16/25    Expected End:  01/30/25            STG - Patient will transfer sit to and from stand, independently with no device. (Progressing)       Start:  01/16/25    Expected End:  01/30/25

## 2025-01-23 NOTE — NURSING NOTE
Ms. Park is resting in bed and preparing for discharge home today.  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  She is to be discharged with Jardiance and Lantus insulin 10 units daily.  Insulin pen teaching with return demo done correctly by Ms. Park.  She is comfortable with the device.  She has the DM handbook for the illustrated step-by-step guide for insulin pen use.    Will sign off at this time.  Time spent:  20 mins.

## 2025-01-23 NOTE — DISCHARGE SUMMARY
Discharge Diagnosis  Obesity hypoventilation syndrome (Multi)    Issues Requiring Follow-Up      Test Results Pending At Discharge  Pending Labs       Order Current Status    Serum Protein Electrophoresis + Immunofixation In process    Serum Protein Electrophoresis + Immunofixation In process            Hospital Course  Ms. Park is a 50 y/o female with past medical h/o uncontrolled HTN, asthma, and depression admitted on 1/16/25 as transfer from [facility] ED for acute on chronic dyspnea concerning for new diagnosis of acute decompensated heart failure with reduced EF. Initial vitals notable for sinus tachycardia with rates to 120s. She was hypertensive to 180s systolic, 120 diastolic. Labs notable for bicarb 35, . She additionally had a leukocytosis of 18.9 which given lack of infectious signs/symptoms was attributed to her being on steroids for possible asthma exacerbation. Trop -ve x2 (43-->44). EKG showed right axis deviation but no ischemic changes. CTPE showed no PE but did note severe cardiomegaly with large pericardial effusion and reflux of contrast into distended hepatic veins suggesting elevated right heart pressures. TTE noted only a small pericardial effusion which was reassuring, also showed LV EF 35-40% and moderately dilated LA. She was given x1 duonebs and admitted to medicine for further workup and management. Started entresto 12/13 mg BID, carvedilol 6.25 mg BID, atorvastatin 40 mg qhs, montelukast, scheduled albuterol nebs, prednisone taper. Spot dosing with IV lasix for goal net -ve 1-2 L. Her hemoglobin A1c is 8.2% for which she was started on lantus 6 units (first dose will be tonight 1/17) and SSI. On 1/17-1/19, patient started on Lasix 80mg BID with Diamox 500mg daily (3 days) which helped to improve her overall clinical status and volume overload. Cardiac MRI showed non-ischemic cardiomyopathy with severe LV dysfunction and findings c/f myocardial infiltration/amyloidosis. PYP SPECT  CT was performed and was negative for TTR Cardiac Amyloidosis. SPEP and UPEP were performed to rule out Light Chain Amyloidosis.              To-Do:  -LABA/ICS (Symbicort) Inhaler on discharge   -Pulmonary outpatient follow up after disscharge   ***    Pertinent Physical Exam At Time of Discharge  Physical Exam    Home Medications     Medication List      START taking these medications    • Advair Diskus 100-50 mcg/dose diskus inhaler; Generic drug: fluticasone   propion-salmeteroL; Inhale 1 puff 2 times a day. Rinse mouth with water   after use to reduce aftertaste and incidence of candidiasis. Do not   swallow.  • atorvastatin 40 mg tablet; Commonly known as: Lipitor; Take 1 tablet (40   mg) by mouth once daily at bedtime.  • carvedilol 6.25 mg tablet; Commonly known as: Coreg; Take 1 tablet (6.25   mg) by mouth 2 times a day.  • ciprofloxacin 0.3 % ophthalmic solution; Commonly known as: Ciloxan;   Administer 1 drop into the left eye every 4 hours for 16 doses.  • Easy Touch Alcohol Prep Pads pads, medicated; Generic drug: alcohol   swabs; Use 4-8 per day to check blood glucose and for injectable   medications  • Entresto 49-51 mg tablet; Generic drug: sacubitriL-valsartan; Take 1   tablet by mouth 2 times a day.  • furosemide 20 mg tablet; Commonly known as: Lasix; Take 1 tablet (20 mg)   by mouth once daily as needed (For weight gain of more than 2 pounds in 2   days with associated leg swelling.).  • Jardiance 10 mg; Generic drug: empagliflozin; Take 1 tablet (10 mg) by   mouth once daily.  • Lantus Solostar U-100 Insulin 100 unit/mL (3 mL) pen; Generic drug:   insulin glargine; Inject 10 Units under the skin once daily at bedtime.   Take as directed per insulin instructions.  • montelukast 10 mg tablet; Commonly known as: Singulair; Take 1 tablet   (10 mg) by mouth once daily at bedtime.  • spironolactone 25 mg tablet; Commonly known as: Aldactone; Take 1 tablet   (25 mg) by mouth once daily.  • True Metrix  "Glucose Meter misc; Generic drug: blood-glucose meter; Use   to check blood glucose  • True Metrix Glucose Test Strip strip; Generic drug: blood sugar   diagnostic; Check blood glucose 4 time per day  • TRUEplus Lancets 33 gauge misc; Generic drug: lancets; 400 Pens 4 times   a day before meals.  • TRUEplus Pen Needle 32 gauge x 5/32\" needle; Generic drug: pen needle,   diabetic; 1 Needle once daily.     CHANGE how you take these medications    • albuterol 2.5 mg /3 mL (0.083 %) nebulizer solution; What changed:   Another medication with the same name was removed. Continue taking this   medication, and follow the directions you see here.     CONTINUE taking these medications    • DULoxetine 60 mg DR capsule; Commonly known as: Cymbalta       Outpatient Follow-Up  Future Appointments   Date Time Provider Department Center   2/24/2025 11:00 AM Marie Nugent MD MPH [id number] [facility]   4/7/2025  8:00 AM Torey Haque MD [id number] [facility]       Johnathan Arias MD  "

## 2025-01-24 ENCOUNTER — TELEPHONE (OUTPATIENT)
Dept: INTERNAL MEDICINE | Facility: HOSPITAL | Age: 50
End: 2025-01-24

## 2025-01-24 LAB
ALBUMIN: 3.6 G/DL (ref 3.4–5)
ALPHA 1 GLOBULIN: 0.4 G/DL (ref 0.2–0.6)
ALPHA 2 GLOBULIN: 0.7 G/DL (ref 0.4–1.1)
BETA GLOBULIN: 0.8 G/DL (ref 0.5–1.2)
GAMMA GLOBULIN: 0.8 G/DL (ref 0.5–1.4)
IMMUNOFIXATION COMMENT: NORMAL
PATH REVIEW - SERUM IMMUNOFIXATION: NORMAL
PATH REVIEW-SERUM PROTEIN ELECTROPHORESIS: NORMAL
PROTEIN ELECTROPHORESIS COMMENT: NORMAL

## 2025-01-24 NOTE — DISCHARGE SUMMARY
Discharge Diagnosis  NICM  HFrEF  Ashtma, Mild Intermittent  DARELL/OSH    Issues Requiring Follow-Up  - New HFrEF  - NICM w/ severe LF dysfunction and c/f myocardial infiltration/amyloidosis   - Asthma, mild intermittent  - DARELL/OSH    Discharge Meds     Medication List      START taking these medications     Advair Diskus 100-50 mcg/dose diskus inhaler; Generic drug: fluticasone   propion-salmeteroL; Inhale 1 puff 2 times a day. Rinse mouth with water   after use to reduce aftertaste and incidence of candidiasis. Do not   swallow.   atorvastatin 40 mg tablet; Commonly known as: Lipitor; Take 1 tablet (40   mg) by mouth once daily at bedtime.   carvedilol 6.25 mg tablet; Commonly known as: Coreg; Take 1 tablet (6.25   mg) by mouth 2 times a day.   ciprofloxacin 0.3 % ophthalmic solution; Commonly known as: Ciloxan;   Administer 1 drop into the left eye every 4 hours for 16 doses.   Easy Touch Alcohol Prep Pads pads, medicated; Generic drug: alcohol   swabs; Use 4-8 per day to check blood glucose and for injectable   medications   Entresto 49-51 mg tablet; Generic drug: sacubitriL-valsartan; Take 1   tablet by mouth 2 times a day.   furosemide 20 mg tablet; Commonly known as: Lasix; Take 1 tablet (20 mg)   by mouth once daily as needed (For weight gain of more than 2 pounds in 2   days with associated leg swelling.).   Jardiance 10 mg; Generic drug: empagliflozin; Take 1 tablet (10 mg) by   mouth once daily.   Lantus Solostar U-100 Insulin 100 unit/mL (3 mL) pen; Generic drug:   insulin glargine; Inject 10 Units under the skin once daily at bedtime.   Take as directed per insulin instructions.   montelukast 10 mg tablet; Commonly known as: Singulair; Take 1 tablet   (10 mg) by mouth once daily at bedtime.   spironolactone 25 mg tablet; Commonly known as: Aldactone; Take 1 tablet   (25 mg) by mouth once daily.   True Metrix Glucose Meter misc; Generic drug: blood-glucose meter; Use   to check blood glucose   True Metrix  "Glucose Test Strip strip; Generic drug: blood sugar   diagnostic; Check blood glucose 4 time per day   TRUEplus Lancets 33 gauge misc; Generic drug: lancets; 400 Pens 4 times   a day before meals.   TRUEplus Pen Needle 32 gauge x 5/32\" needle; Generic drug: pen needle,   diabetic; 1 Needle once daily.     CHANGE how you take these medications     albuterol 2.5 mg /3 mL (0.083 %) nebulizer solution; What changed:   Another medication with the same name was removed. Continue taking this   medication, and follow the directions you see here.     CONTINUE taking these medications     DULoxetine 60 mg DR capsule; Commonly known as: Cymbalta       Test Results Pending At Discharge  Pending Labs       No current pending labs.            Hospital Course  Ms. Park is a 48 y/o female with past medical h/o uncontrolled HTN, asthma, and depression admitted on 1/16/25 as transfer from [facility] ED for acute on chronic dyspnea concerning for new diagnosis of acute decompensated heart failure with reduced EF. Initial vitals notable for sinus tachycardia with rates to 120s. She was hypertensive to 180s systolic, 120 diastolic. Labs notable for bicarb 35, . She additionally had a leukocytosis of 18.9 which given lack of infectious signs/symptoms was attributed to her being on steroids for possible asthma exacerbation. Trop -ve x2 (43-->44). EKG showed right axis deviation but no ischemic changes. CTPE showed no PE but did note severe cardiomegaly with large pericardial effusion and reflux of contrast into distended hepatic veins suggesting elevated right heart pressures. TTE noted only a small pericardial effusion which was reassuring, also showed LV EF 35-40% and moderately dilated LA. She was given x1 duonebs and admitted to medicine for further workup and management. Started entresto 12/13 mg BID, carvedilol 6.25 mg BID, atorvastatin 40 mg qhs, montelukast, scheduled albuterol nebs, prednisone taper. Spot dosing with IV " lasix for goal net -ve 1-2 L. Her hemoglobin A1c is 8.2% for which she was started on lantus 6 units (first dose will be tonight 1/17) and SSI. On 1/17-1/19, patient started on Lasix 80mg BID with Diamox 500mg daily (3 days) which helped to improve her overall clinical status and volume overload. Cardiac MRI showed non-ischemic cardiomyopathy with severe LV dysfunction and findings c/f myocardial infiltration/amyloidosis. PYP SPECT CT was performed and was negative for TTR Cardiac Amyloidosis. SPEP and UPEP were performed to rule out Light Chain Amyloidosis.    To Do:   - Follow-up pulmonology for Asthma  - Follow-up cardiology for new HFrEF  - Follow-up Endocrinology T2DM and weight loss  - Follow-up Sleep medicine for DARELL/OSH       Pertinent Physical Exam At Time of Discharge    Constitutional: Alert, lying in bed (flat), no acute distress  HEENT: normocephalic, atraumatic, conjunctiva normal   Respiratory: effort normal and appropriate, breath sounds heard in all fields, clear to auscultation bilaterally  Cardiovascular: RRR, s1 and s2 noted, no murmurs, rub, or gallops appreciated, distal pulses 2+ throughout, +1 BLE to the mid shin  Abdominal: flat, soft, non-tender, normal bowel sounds   MSK/Derm: skin warm and dry, muscle tone and strength appropriate  Neurological: at baseline, AOx4  Psych: Appropriate mood and behavior    Outpatient Follow-Up  Future Appointments   Date Time Provider Department Center   2/24/2025 11:00 AM Marie Nugent MD [facility] [id number] [facility]   4/7/2025  8:00 AM Torey Haque MD [id number] [facility]         James Troy MD

## 2025-01-24 NOTE — TELEPHONE ENCOUNTER
TC placed to patient to inform her of nml results for UPEP and SPEP. Voicemail left with appropriate follow-up instructions.     James Troy MD

## 2025-01-27 ENCOUNTER — APPOINTMENT (OUTPATIENT)
Dept: PRIMARY CARE | Facility: CLINIC | Age: 50
End: 2025-01-27

## 2025-02-06 ENCOUNTER — APPOINTMENT (OUTPATIENT)
Dept: PULMONOLOGY | Facility: CLINIC | Age: 50
End: 2025-02-06

## 2025-02-07 PROBLEM — Z90.710 S/P LAPAROSCOPIC HYSTERECTOMY: Status: ACTIVE | Noted: 2020-08-12

## 2025-02-07 PROBLEM — E66.812 OBESITY, CLASS II, BMI 35-39.9: Status: ACTIVE | Noted: 2020-08-12

## 2025-02-07 PROBLEM — F41.1 GENERALIZED ANXIETY DISORDER: Status: ACTIVE | Noted: 2021-06-28

## 2025-02-07 PROBLEM — F51.01 PRIMARY INSOMNIA: Status: ACTIVE | Noted: 2021-06-28

## 2025-02-07 PROBLEM — D25.0 SUBMUCOUS AND SUBSEROUS LEIOMYOMA OF UTERUS: Status: ACTIVE | Noted: 2020-08-13

## 2025-02-07 PROBLEM — E03.8 OTHER SPECIFIED HYPOTHYROIDISM: Status: ACTIVE | Noted: 2023-09-15

## 2025-02-07 PROBLEM — J45.41 MODERATE PERSISTENT ASTHMA WITH EXACERBATION (HHS-HCC): Status: ACTIVE | Noted: 2025-02-07

## 2025-02-07 PROBLEM — R73.03 PREDIABETES: Status: ACTIVE | Noted: 2023-09-15

## 2025-02-07 PROBLEM — J45.909 UNCOMPLICATED ASTHMA (HHS-HCC): Status: ACTIVE | Noted: 2023-09-15

## 2025-02-07 PROBLEM — D25.2 SUBMUCOUS AND SUBSEROUS LEIOMYOMA OF UTERUS: Status: ACTIVE | Noted: 2020-08-13

## 2025-02-07 PROBLEM — I10 HYPERTENSION: Status: ACTIVE | Noted: 2023-09-15

## 2025-02-07 PROBLEM — F98.8 ATTENTION DEFICIT DISORDER: Status: ACTIVE | Noted: 2021-06-28

## 2025-02-07 PROBLEM — D50.0 IRON DEFICIENCY ANEMIA DUE TO CHRONIC BLOOD LOSS: Status: ACTIVE | Noted: 2020-08-13

## 2025-02-07 PROBLEM — E78.00 HYPERCHOLESTEROLEMIA: Status: ACTIVE | Noted: 2023-09-15

## 2025-02-07 PROBLEM — E55.9 VITAMIN D DEFICIENCY: Status: ACTIVE | Noted: 2021-06-28

## 2025-02-07 PROBLEM — M17.11 LOCALIZED OSTEOARTHRITIS OF RIGHT KNEE: Status: ACTIVE | Noted: 2020-12-18

## 2025-02-09 LAB
ATRIAL RATE: 123 BPM
P AXIS: 60 DEGREES
P OFFSET: 205 MS
P ONSET: 156 MS
PR INTERVAL: 130 MS
Q ONSET: 221 MS
QRS COUNT: 20 BEATS
QRS DURATION: 90 MS
QT INTERVAL: 334 MS
QTC CALCULATION(BAZETT): 478 MS
QTC FREDERICIA: 424 MS
R AXIS: 118 DEGREES
T AXIS: 33 DEGREES
T OFFSET: 388 MS
VENTRICULAR RATE: 123 BPM

## 2025-02-10 ENCOUNTER — APPOINTMENT (OUTPATIENT)
Dept: PRIMARY CARE | Facility: CLINIC | Age: 50
End: 2025-02-10

## 2025-02-11 ENCOUNTER — HOSPITAL ENCOUNTER (OUTPATIENT)
Dept: RADIOLOGY | Facility: HOSPITAL | Age: 50
Discharge: HOME | End: 2025-02-11
Payer: MEDICAID

## 2025-02-11 DIAGNOSIS — Z12.31 SCREENING MAMMOGRAM FOR BREAST CANCER: ICD-10-CM

## 2025-02-11 PROCEDURE — RXMED WILLOW AMBULATORY MEDICATION CHARGE

## 2025-02-11 PROCEDURE — 77067 SCR MAMMO BI INCL CAD: CPT

## 2025-02-11 PROCEDURE — 77067 SCR MAMMO BI INCL CAD: CPT | Performed by: RADIOLOGY

## 2025-02-11 PROCEDURE — 77063 BREAST TOMOSYNTHESIS BI: CPT | Performed by: RADIOLOGY

## 2025-02-13 ENCOUNTER — PHARMACY VISIT (OUTPATIENT)
Dept: PHARMACY | Facility: CLINIC | Age: 50
End: 2025-02-13
Payer: MEDICAID

## 2025-02-19 ENCOUNTER — APPOINTMENT (OUTPATIENT)
Dept: PULMONOLOGY | Facility: CLINIC | Age: 50
End: 2025-02-19

## 2025-02-19 VITALS
SYSTOLIC BLOOD PRESSURE: 93 MMHG | DIASTOLIC BLOOD PRESSURE: 64 MMHG | WEIGHT: 210.4 LBS | OXYGEN SATURATION: 92 % | HEART RATE: 92 BPM | BODY MASS INDEX: 42.47 KG/M2

## 2025-02-19 DIAGNOSIS — I50.20 HFREF (HEART FAILURE WITH REDUCED EJECTION FRACTION): ICD-10-CM

## 2025-02-19 DIAGNOSIS — R29.818 SUSPECTED SLEEP APNEA: ICD-10-CM

## 2025-02-19 DIAGNOSIS — J45.909 ASTHMA, UNSPECIFIED ASTHMA SEVERITY, UNSPECIFIED WHETHER COMPLICATED, UNSPECIFIED WHETHER PERSISTENT (HHS-HCC): Primary | ICD-10-CM

## 2025-02-19 PROCEDURE — 3078F DIAST BP <80 MM HG: CPT | Performed by: PEDIATRICS

## 2025-02-19 PROCEDURE — 99245 OFF/OP CONSLTJ NEW/EST HI 55: CPT | Performed by: PEDIATRICS

## 2025-02-19 PROCEDURE — 1036F TOBACCO NON-USER: CPT | Performed by: PEDIATRICS

## 2025-02-19 PROCEDURE — 3074F SYST BP LT 130 MM HG: CPT | Performed by: PEDIATRICS

## 2025-02-19 RX ORDER — FLUTICASONE PROPIONATE AND SALMETEROL 250; 50 UG/1; UG/1
1 POWDER RESPIRATORY (INHALATION)
Qty: 60 EACH | Refills: 11 | Status: SHIPPED | OUTPATIENT
Start: 2025-02-19 | End: 2026-02-19

## 2025-02-19 RX ORDER — ALBUTEROL SULFATE 0.83 MG/ML
3 SOLUTION RESPIRATORY (INHALATION) ONCE
OUTPATIENT
Start: 2025-02-19 | End: 2025-02-19

## 2025-02-19 RX ORDER — ALBUTEROL SULFATE 90 UG/1
1 INHALANT RESPIRATORY (INHALATION) ONCE
OUTPATIENT
Start: 2025-02-19

## 2025-02-19 ASSESSMENT — PATIENT HEALTH QUESTIONNAIRE - PHQ9
SUM OF ALL RESPONSES TO PHQ9 QUESTIONS 1 AND 2: 0
1. LITTLE INTEREST OR PLEASURE IN DOING THINGS: NOT AT ALL
2. FEELING DOWN, DEPRESSED OR HOPELESS: NOT AT ALL

## 2025-02-19 NOTE — PROGRESS NOTES
Subjective   Patient ID: Samina Park is a 50 y.o. female who presents for asthma, DARELL    HPI    Ms Park has a history of childhood asthma which as an adult only intermittently bothered her.  She thought she was having a flair up of her asthma but albuterol did not help which is unusual for her.  She ended up getting admitted 1/16-23 and was found to be in congestive heart failure.  With HFrEF.  Today she feels her breathing is better.  She was given advair as well, she thinks it is helping but it is hard to tell if that is because of the heart failure treatment or asthma treatment.  In the hospital there was also concern for DARELL.  She feels her sleep is better than before the admission but does not know if she stops breathing at night.    [place] today is 17    I reviewed the angio CT on admission.  There is cardiomegaly with normal lungs.    She never smoked.    Review of Systems    See scanned documents attached to this note for review of systems, and appropriate scales/scores for this visit.    Objective   Physical Exam  Constitutional:       Appearance: Normal appearance. She is obese.   HENT:      Head: Normocephalic and atraumatic.      Mouth/Throat:      Pharynx: Oropharynx is clear.   Cardiovascular:      Rate and Rhythm: Normal rate and regular rhythm.      Pulses: Normal pulses.      Heart sounds: Normal heart sounds.   Pulmonary:      Effort: Pulmonary effort is normal.      Breath sounds: Normal breath sounds. No wheezing, rhonchi or rales.   Abdominal:      General: Bowel sounds are normal.      Palpations: Abdomen is soft.   Musculoskeletal:         General: Normal range of motion.   Skin:     General: Skin is warm and dry.   Neurological:      General: No focal deficit present.      Mental Status: She is alert and oriented to person, place, and time.   Psychiatric:         Mood and Affect: Mood normal.       Assessment/Plan     50yr old with asthma, now with cardiomyopathy/CHF    Asthma:  unclear  how much most recent issues are related to asthma vs CHF.  Certainly both can be contributing  - ordered advair 250/50, continue albuterol as needed  - will get PFT but will hold off until asthma and CHF are under better control. (Ordered PFT for about 3 months from now)    2. CHF:  better now  - follow up with cardiology    3. DARELL:  I explained that CHF and DARELL often go hadn in hand and if there is DARELL the CHF will be much more difficult to control.  She has an appointment with Dr Evans in sleep medicine  - encouraged her to keep that appiotment    Follow up 3 months       James Read MD 02/19/25 9:43 AM

## 2025-02-19 NOTE — LETTER
February 19, 2025     Kelle Mi MD  [address]  Department Of Medicine-General Internal  [facility] [address]    Patient: Samina Park   YOB: 1975   Date of Visit: 2/19/2025       Dear Dr. Kelle Mi MD:    Thank you for referring Samina Park to me for evaluation. Below are my notes for this consultation.  If you have questions, please do not hesitate to call me. I look forward to following your patient along with you.       Sincerely,     James Read MD      CC: No Recipients  ______________________________________________________________________________________    Subjective  Patient ID: Samina Park is a 50 y.o. female who presents for asthma, DARELL    HPI    Ms Park has a history of childhood asthma which as an adult only intermittently bothered her.  She thought she was having a flair up of her asthma but albuterol did not help which is unusual for her.  She ended up getting admitted 1/16-23 and was found to be in congestive heart failure.  With HFrEF.  Today she feels her breathing is better.  She was given advair as well, she thinks it is helping but it is hard to tell if that is because of the heart failure treatment or asthma treatment.  In the hospital there was also concern for DARELL.  She feels her sleep is better than before the admission but does not know if she stops breathing at night.    [place] today is 17    I reviewed the angio CT on admission.  There is cardiomegaly with normal lungs.    She never smoked.    Review of Systems    See scanned documents attached to this note for review of systems, and appropriate scales/scores for this visit.    Objective  Physical Exam  Constitutional:       Appearance: Normal appearance. She is obese.   HENT:      Head: Normocephalic and atraumatic.      Mouth/Throat:      Pharynx: Oropharynx is clear.   Cardiovascular:      Rate and Rhythm: Normal rate and regular rhythm.      Pulses: Normal pulses.      Heart sounds:  Normal heart sounds.   Pulmonary:      Effort: Pulmonary effort is normal.      Breath sounds: Normal breath sounds. No wheezing, rhonchi or rales.   Abdominal:      General: Bowel sounds are normal.      Palpations: Abdomen is soft.   Musculoskeletal:         General: Normal range of motion.   Skin:     General: Skin is warm and dry.   Neurological:      General: No focal deficit present.      Mental Status: She is alert and oriented to person, place, and time.   Psychiatric:         Mood and Affect: Mood normal.       Assessment/Plan    50yr old with asthma, now with cardiomyopathy/CHF    Asthma:  unclear how much most recent issues are related to asthma vs CHF.  Certainly both can be contributing  - ordered advair 250/50, continue albuterol as needed  - will get PFT but will hold off until asthma and CHF are under better control. (Ordered PFT for about 3 months from now)    2. CHF:  better now  - follow up with cardiology    3. DARELL:  I explained that CHF and DARELL often go hadn in hand and if there is DARELL the CHF will be much more difficult to control.  She has an appointment with Dr Evans in sleep medicine  - encouraged her to keep that appiotment    Follow up 3 months       James Read MD 02/19/25 9:43 AM

## 2025-02-24 ENCOUNTER — OFFICE VISIT (OUTPATIENT)
Dept: CARDIOLOGY | Facility: CLINIC | Age: 50
End: 2025-02-24
Payer: MEDICAID

## 2025-02-24 VITALS
HEIGHT: 59 IN | SYSTOLIC BLOOD PRESSURE: 106 MMHG | OXYGEN SATURATION: 92 % | DIASTOLIC BLOOD PRESSURE: 71 MMHG | BODY MASS INDEX: 42.28 KG/M2 | WEIGHT: 209.7 LBS | HEART RATE: 90 BPM

## 2025-02-24 DIAGNOSIS — I10 PRIMARY HYPERTENSION: ICD-10-CM

## 2025-02-24 DIAGNOSIS — I50.20 HFREF (HEART FAILURE WITH REDUCED EJECTION FRACTION): Primary | ICD-10-CM

## 2025-02-24 DIAGNOSIS — I42.8 NON-ISCHEMIC CARDIOMYOPATHY (MULTI): ICD-10-CM

## 2025-02-24 PROCEDURE — 3078F DIAST BP <80 MM HG: CPT | Performed by: INTERNAL MEDICINE

## 2025-02-24 PROCEDURE — 99215 OFFICE O/P EST HI 40 MIN: CPT | Performed by: INTERNAL MEDICINE

## 2025-02-24 PROCEDURE — 1036F TOBACCO NON-USER: CPT | Performed by: INTERNAL MEDICINE

## 2025-02-24 PROCEDURE — 3008F BODY MASS INDEX DOCD: CPT | Performed by: INTERNAL MEDICINE

## 2025-02-24 PROCEDURE — 3074F SYST BP LT 130 MM HG: CPT | Performed by: INTERNAL MEDICINE

## 2025-02-24 RX ORDER — METOPROLOL SUCCINATE 25 MG/1
50 TABLET, EXTENDED RELEASE ORAL DAILY
Qty: 180 TABLET | Refills: 3 | Status: SHIPPED | OUTPATIENT
Start: 2025-02-24 | End: 2026-02-24

## 2025-02-24 ASSESSMENT — PAIN SCALES - GENERAL: PAINLEVEL_OUTOF10: 0-NO PAIN

## 2025-02-24 NOTE — PATIENT INSTRUCTIONS
To reach Dr. Nugent's office please call [phone] ([facility]). Fax [phone]. Call [phone] to schedule an appointment. You may also contact the HF RNs at [email or web address]    Thank you for coming to your appointment today. If you have any questions or need cardiac medication refills, please call the Heart Failure Office at [phone] option [phone].     STOP Carvedilol   START Metoprolol Succinate 50mg daily. After one week please increase to 75mg daily. After a week on that please increase to 100mg daily.   We have referred you to Bariatric Surgery. Call [phone] to schedule.   We have referred you to cardiac rehab. Call [phone] to schedule.   Follow up with Dr. Nugent in one month. A few days before you see him. Please have labs drawn.

## 2025-02-24 NOTE — PROGRESS NOTES
Heart Failure Cardiology    Samina Park is a 50 y.o. 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 Lives with her brother and her kids (daughter teenager, son adult, nephew/her brother's son who is a child).    She was referred to me following hospitalization for newly diagnosed heart failure.    For approximately 3 months she had progressive leg swelling and shortness of breath, but she did not pursue care as she was uninsured.  Finally this became severe and in January 2025 she went to urgent care/ER for evaluation of shortness of breath.  She was ultimately admitted and found to have cardiomyopathy and heart failure.    In the hospital testing involved echocardiogram cardiac MRI, Harley PYP scan.  Results are all below.    At this time she is back home, taking quadruple therapy for HFrEF.  She reports feeling much better and is without fluid swelling.      Studies:    Tech PYP scan 1/22/2025  1. Amyloid SPECT heart study reveals no definite evidence suggestive  of TTR amyloidosis.  2. Representative images were saved to the PACS system.    Chest CT/PE 1/15/2025:  1.  No pulmonary embolism identified.  2. Severe cardiomegaly with large pericardial effusion. Reflux of contrast into the distended hepatic veins suggests elevated right heart pressures.  3. 3 mm right lower lobe nodule. Mildly enlarged subcarinal lymph nodes perhaps reactive. Consider follow-up CT chest in 1 year to document stability.  4. Partially imaged upper abdominal ascites and anasarca.    Echocardiogram 1/15/2025   1. Poorly visualized anatomical structures due to suboptimal image quality.   2. Left ventricular ejection fraction is moderately decreased, by visual estimate at 35-40%.   3. Spectral Doppler shows an abnormal pattern of left ventricular diastolic filling.   4. There is normal right ventricular global systolic function.   5. The left atrium is moderately dilated.   6. There is a small  "pericardial effusion.     Cardiac MRI 1/20/2025 ([facility])  1.  Normal LV size (EDVi 92 ml/m2) with severely reduced systolic function (LVEF 26%).  2.  Global hypokinesis with minor regional variation.  3.  Mild LVH (1.2-1.3 cm, septum)  4. Regionally elevated myocardial T2 values suggestive of possible myocardial edema/inflammation.  5.  Increased ECV 32%, suggestive of diffuse myocardial fibrosis vs edema.  6.  LGE imaging reveals diffuse global enhancement suggestive of diffuse fibrosis vs infiltration/amyloid.  7.  Moderately dilated RV with moderately impaired systolic function (RVEF 32%).  8.  Dilated pulmonary arteries. Correlate/concern for elevated pulmonary pressures.  9.  Moderate pericardial effusion with the largest pocket around the left ventricle measuring 1.4 cm. Normal pericardial thickness. No evidence of respirophasic septal shift or RV/RA collapse to suggest  tamponade.  10. No evidence of significant intracardiac shunt (Qp:Qs 0.94).      The CMR demonstrates non-ischemic cardiomyopathy with severe LV systolic dysfunction and diffuse LGE with focally elevated T2 values. Differentials include but are not limited to myocardial  infiltration/amyloid vs myocarditis/inflammatory cardiomyopathies.    Exam: /71 (BP Location: Left arm, Patient Position: Sitting, BP Cuff Size: Large adult)   Pulse 90   Ht 1.499 m (4' 11\")   Wt 95.1 kg (209 lb 11.2 oz)   SpO2 92%   BMI 42.35 kg/m²   Pleasant, alert, oriented, no distress  Obesity  CTA  RRR  no murmur  No LE edema    Current Outpatient Medications   Medication Instructions    albuterol 2.5 mg, Every 6 hours PRN    alcohol swabs (Alcohol Pads) pads, medicated Use 4-8 per day to check blood glucose and for injectable medications    atorvastatin (LIPITOR) 40 mg, oral, Nightly    blood sugar diagnostic (Blood Glucose Test) strip Check blood glucose 4 time per day    blood-glucose meter misc Use to check blood glucose    carvedilol (COREG) " "6.25 mg, oral, 2 times daily    fluticasone propion-salmeteroL (Advair Diskus) 250-50 mcg/dose diskus inhaler 1 puff, inhalation, 2 times daily RT, Rinse mouth with water after use.    furosemide (LASIX) 20 mg, oral, Daily PRN    Jardiance 10 mg, oral, Daily    Lantus Solostar U-100 Insulin 10 Units, subcutaneous, Nightly, Take as directed per insulin instructions.    montelukast (SINGULAIR) 10 mg, oral, Nightly    pen needle, diabetic 32 gauge x 5/32\" needle 1 Needle, miscellaneous, Daily    sacubitriL-valsartan (Entresto) 49-51 mg tablet 1 tablet, oral, 2 times daily    spironolactone (ALDACTONE) 25 mg, oral, Daily     Past medical history (non-cardiac):  -- Obesity (BMI 42)  -- Hypertension  -- Asthma  -- Depression  -- DARELL  -- DM2 (A1C 8.2%)  -- History of ETOH/julio césar drinking    Cardiovascular history:  -- Cardiomyopathy, presumed non-ischemic (diagnosed 1/2025).  Potential types of cardiomyopathy include: Hypertensive cardiomyopathy, diabetes/obesity cardiomyopathy, DARELL/PHTN/cor pulmonale, alcoholic cardiomyopathy.  -- HFrEF, Stage C, NYHA class III-IV (confounded by obesity)    Assessment: 50 y.o. WF, with cardiomyopathy (presumed non-ischemic) with biventricular dysfunction, and stage C heart failure.  Fluid overload resolved following hospitalization.  Now on quadruple therapy but not an optimal dosages.  Only using furosemide as needed, and in fact has not needed it since hospitalization.     For her therapeutic targets include: Optimization of GDMT, treatment of sleep apnea, treatment of obesity, exercise.    Plan:  -- Check serum free light chains  -- She is due to see Sleep Medicine in 3/3/2025, she couldn't tolerate face mask in hospital... ?nasal mask  -- Switch from carvedilol to metoprolol succinate 50mg every day. If tolerated increase dose to 75mg, and then incrementally to 100mg every day.   -- Refer to Bariatric surgery evaluation  -- Refer to cardiac rehab  -- Return to see me in 1 month with " labs    MDM: Level 5  I reviewed and interpreted echo and cardiac MRI images  I reviewed multiple notes from other providers  I ordered new testing  She will require monitoring for toxicity of cardiac medications  She has a newly diagnosed cardiac disorder that is threatening to life    Marie Nugent MD, MPH  Advanced Heart Failure and Transplant Cardiology ([facility])  [facility]  [place], [place]

## 2025-02-26 ENCOUNTER — APPOINTMENT (OUTPATIENT)
Dept: PRIMARY CARE | Facility: CLINIC | Age: 50
End: 2025-02-26
Payer: MEDICAID

## 2025-03-01 ENCOUNTER — APPOINTMENT (OUTPATIENT)
Dept: OBSTETRICS AND GYNECOLOGY | Facility: CLINIC | Age: 50
End: 2025-03-01
Payer: MEDICAID

## 2025-03-01 ASSESSMENT — PROMIS GLOBAL HEALTH SCALE
RATE_QUALITY_OF_LIFE: VERY GOOD
RATE_PHYSICAL_HEALTH: GOOD
RATE_MENTAL_HEALTH: FAIR
EMOTIONAL_PROBLEMS: RARELY
CARRYOUT_SOCIAL_ACTIVITIES: EXCELLENT
RATE_AVERAGE_FATIGUE: MODERATE
RATE_SOCIAL_SATISFACTION: EXCELLENT
RATE_AVERAGE_PAIN: 1
RATE_GENERAL_HEALTH: FAIR
CARRYOUT_PHYSICAL_ACTIVITIES: MOSTLY

## 2025-03-03 ENCOUNTER — APPOINTMENT (OUTPATIENT)
Dept: SLEEP MEDICINE | Facility: CLINIC | Age: 50
End: 2025-03-03
Payer: MEDICAID

## 2025-03-03 DIAGNOSIS — F10.10 ENGAGES IN BINGE CONSUMPTION OF ALCOHOL: ICD-10-CM

## 2025-03-03 DIAGNOSIS — I50.20 HFREF (HEART FAILURE WITH REDUCED EJECTION FRACTION): ICD-10-CM

## 2025-03-03 DIAGNOSIS — G47.30 SLEEP-DISORDERED BREATHING: Primary | ICD-10-CM

## 2025-03-03 DIAGNOSIS — E66.01 MORBID OBESITY WITH BMI OF 40.0-44.9, ADULT (MULTI): ICD-10-CM

## 2025-03-03 PROCEDURE — 99204 OFFICE O/P NEW MOD 45 MIN: CPT | Performed by: PSYCHIATRY & NEUROLOGY

## 2025-03-03 PROCEDURE — 1036F TOBACCO NON-USER: CPT | Performed by: PSYCHIATRY & NEUROLOGY

## 2025-03-03 NOTE — PROGRESS NOTES
Patient: Samina Park    [id number]  : 1975 -- AGE 50 y.o.    Provider: Jey Evans MD     Location [facility]   Service Date: 3/2/2025              [facility]  New Visit Note      Virtual or Telephone Consent  An interactive audio and video telecommunication system which permits real time communications between the patient (at the originating site) and provider (at the distant site) was utilized to provide this telehealth service.   Verbal consent was requested and obtained from Samina Park on this date, 25 for a telehealth visit and the patient's location was confirmed at the time of the visit.  I verified the patient's identity and physical location in [place].  If this is a new patient to me, I informed the patient of my name and type of active [place] license that I hold.        The patient's referring provider is: Kelle Mi MD    HPI:  Samina Park is a 50 y.o. female with insomnia and PMH notable for HFrEF, pulm HTN, HTN, non-ischemic cardiomyopathy, OHS, asthma, prediabetes, obesity, vitamin D deficiency, iron deficiency anemia due to chronic blood loss, ADD, generalized anxiety disorder, presents today due to suspected sleep apnea.    She was hospitalized in 2025 when she presented with chronic dyspnea, found to have HFrEF and she was hypertensive to the 180s systolic and 120 diastolic, bicarb was 35, . Tried CPAP in the hospital with a full face mask - hard to breathe with it and may have had claustrophobia, but she is willing to try again if needed, but with a different mask, eg. Nasal mask or nasal pillows.    She feels her quality of sleep is good. Feels she gets 7 hours of sleep. Sometimes wakes up a couple times per night spontaneously, then returns to sleep easily. Wakes up feeling tired.    NIGHTTIME SYMPTOMS:   Snoring: yes, started at least a few months ago, worse when she has nasal congestion when she is ill, unknown if  worsening snoring over time, unknown intensity of her snoring  Witnessed apnea: no  Nocturnal gasping: a few times, but not in the last few months since she lost some water weight  Nocturnal choking: no  Sleep walking: No  Sleep talking:  No  Dream enactment: No  Bruxism: does not think so  Morning headaches: No  Morning dry mouth/sore throat: No  Nocturia: occasional  Sleep paralysis: No  Hypnagogic/hypnopompic hallucinations: No  Bedroom environment is conducive to sleep: Yes    DAYTIME SYMPTOMS  Daytime sleepiness: only when idle, watching TV  Fatigue: only when idle, watching TV  Dozing: when sitting on the couch to watch  Feeling sleepy while driving: drowsy if she drives more than an hour, and it is rare to drive more than an hour    RLS symptoms: No   Cataplexy: No    SLEEP HABITS:   Self-described morning person  Preferred sleep position: side  Bedtime: normally falls asleep on the couch around 9 pm while watching, then wakes up to go to bed around 10-11 pm  Wake time: 430 or 530 am  Napping: maybe twice per week for half an hour to 1.5 hours. 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)    Volume overload    Suspected sleep apnea    Attention deficit disorder    Cellulitis    Dizziness    Hypercholesterolemia    Hypertension    Iron deficiency anemia due to chronic blood loss    Localized osteoarthritis of right knee     Generalized anxiety disorder    Moderate persistent asthma with exacerbation ([facility])    Obesity, Class II, BMI 35-39.9    Other specified hypothyroidism    Prediabetes    Primary insomnia    S/P laparoscopic hysterectomy    Submucous and subserous leiomyoma of uterus    Uncomplicated asthma ([facility])    Vitamin D deficiency    Non-ischemic cardiomyopathy (Multi)     Past Medical History:   Diagnosis Date    ADHD (attention deficit hyperactivity disorder)     Anxiety     Asthma ?    Depression ?    Diabetes mellitus (Multi) 2025    Heart disease 2025    Hypertension     Visual impairment ?     Past Surgical History:   Procedure Laterality Date     SECTION, LOW TRANSVERSE  Oct 6, 2009 & 2001    HYSTERECTOMY   or ?     Current Outpatient Medications   Medication Sig Dispense Refill    albuterol 2.5 mg /3 mL (0.083 %) nebulizer solution Take 3 mL (2.5 mg) by nebulization every 6 hours if needed for wheezing or shortness of breath.      alcohol swabs (Alcohol Pads) pads, medicated Use 4-8 per day to check blood glucose and for injectable medications 400 each 3    atorvastatin (Lipitor) 40 mg tablet Take 1 tablet (40 mg) by mouth once daily at bedtime. 30 tablet 1    blood sugar diagnostic (Blood Glucose Test) strip Check blood glucose 4 time per day 400 each 0    blood-glucose meter misc Use to check blood glucose 1 each 0    empagliflozin (Jardiance) 10 mg Take 1 tablet (10 mg) by mouth once daily. 30 tablet 1    fluticasone propion-salmeteroL (Advair Diskus) 250-50 mcg/dose diskus inhaler Inhale 1 puff 2 times a day. Rinse mouth with water after use. 60 each 11    furosemide (Lasix) 20 mg tablet Take 1 tablet (20 mg) by mouth once daily as needed (For weight gain of more than 2 pounds in 2 days with associated leg swelling.). 30 tablet 1    insulin glargine (Lantus Solostar U-100 Insulin) 100 unit/mL (3 mL) pen Inject 10 Units under the skin once daily at  "bedtime. Take as directed per insulin instructions. 15 mL 0    metoprolol succinate XL (Toprol-XL) 25 mg 24 hr tablet Take 2 tablets (50 mg) by mouth once daily. Do not crush or chew. 180 tablet 3    montelukast (Singulair) 10 mg tablet Take 1 tablet (10 mg) by mouth once daily at bedtime. 30 tablet 0    pen needle, diabetic 32 gauge x 5/32\" needle 1 Needle once daily. 100 each 0    sacubitriL-valsartan (Entresto) 49-51 mg tablet Take 1 tablet by mouth 2 times a day. 60 tablet 1    spironolactone (Aldactone) 25 mg tablet Take 1 tablet (25 mg) by mouth once daily. 30 tablet 1     No current facility-administered medications for this visit.     No Known Allergies      Family History   Problem Relation Name Age of Onset    COPD Mother Lisa Cotton     Alcohol abuse Father Jose Park     Diabetes Maternal Grandmother Lindsey Cotton     Alcohol abuse Mother's Brother Miguel Cotton     Breast cancer Mother's Sister Stephania Hernandez     Learning disabilities Brother Donn Pratt        SOCIAL HISTORY  Employment: works with [employer] with [employer] as a   Lives with: daughter (15), brother, nephew, and her son (23)  Alcohol: used to binge drink, never had withdrawals. Now drinks about once/month or less and gets drunk (5 drinks)  Cigarettes: never  Illicits: no     ROS: 12 point ROS positive for intentional weight loss.    PHYSICAL EXAMINATION:   Height reported at 4' 11\", weight reported at 205 lbs.  General: Awake. Alert. Comfortable. No apparent distress.   Speech: other than having a lisp, speech is normal  Comprehension: Normal  Mood: Stable  Affect: Appropriate  Eyes:   Eyelids: normal            ENT:          Unable to assess patency of nasal passageways or for presence of nasal septum deviation. Unable to clearly view posterior oropharynx to assess tonsils, uvula, and [place] tongue score. Tongue scalloping is present, tongue is mildly enlarged, Dentition good - has a partial upper denture " plate.           Neck:          Circumference: increased in caliber  Cardiac:  unable to assess pulses or cardiac rate/rhythm.  Pul:         Normal respiratory effort   Abd:         obese  Neuro: Alert, well-oriented. Cranial nerves II-XII grossly normal and symmetric.  No abnormal movements noted.       LABS/DIAGNOSTICS:  Lab Results   Component Value Date    HGB 17.2 (H) 01/23/2025    CO2 27 01/23/2025    TSH 1.64 01/15/2025    HGBA1C 8.2 (H) 01/15/2025        Echo 1/2025: LVEF 35-40%. Spectral Doppler shows an abnormal pattern of left ventricular diastolic filling.  Normal RV global systolic function. Left atrium moderately     PFTs: pending      ASSESSMENT AND PLAN: Ms. Samina Park is a 50 y.o. female with a history of HFrEF, pulm HTN, HTN, non-ischemic cardiomyopathy, OHS, asthma, prediabetes, obesity, vitamin D deficiency, iron deficiency anemia due to chronic blood loss, ADD, generalized anxiety disorder, who has a history of snoring and had gasping during sleep (prior to her recent hospitalization for heart failure). Due to concern for underlying sleep apnea (and she may have both obstructive due to her weight and central due to her heart failure), in-lab testing is indicated.    #sleep disordered breathing  #HFrEF  #morbid obesity  -We discussed the risk factors for sleep apnea, pathophysiology of sleep apnea, treatment options, and potential long-term complications of untreated DARELL, including cardiovascular and metabolic complications. We will start evaluation with a split night in-lab sleep test. Plan for PAP therapy if needed  -she is exercising and working on weight loss    #engages in binge consumption of alcohol  -advised pt against binge drinking      All of the above was discussed with the patient in detail. She voiced an understanding of the above and was agreeable to proceed further as advised. Procedure for the sleep study was discussed with her.      FOLLOW UP:  After study to discuss results

## 2025-03-05 ENCOUNTER — APPOINTMENT (OUTPATIENT)
Dept: PRIMARY CARE | Facility: CLINIC | Age: 50
End: 2025-03-05
Payer: MEDICAID

## 2025-03-05 VITALS
HEART RATE: 97 BPM | BODY MASS INDEX: 41.33 KG/M2 | OXYGEN SATURATION: 95 % | SYSTOLIC BLOOD PRESSURE: 105 MMHG | WEIGHT: 205 LBS | DIASTOLIC BLOOD PRESSURE: 73 MMHG | HEIGHT: 59 IN

## 2025-03-05 DIAGNOSIS — E66.1 CLASS 3 DRUG-INDUCED OBESITY WITH SERIOUS COMORBIDITY AND BODY MASS INDEX (BMI) OF 40.0 TO 44.9 IN ADULT: ICD-10-CM

## 2025-03-05 DIAGNOSIS — G47.33 OSA (OBSTRUCTIVE SLEEP APNEA): Primary | ICD-10-CM

## 2025-03-05 DIAGNOSIS — J45.909 ASTHMA, UNSPECIFIED ASTHMA SEVERITY, UNSPECIFIED WHETHER COMPLICATED, UNSPECIFIED WHETHER PERSISTENT (HHS-HCC): ICD-10-CM

## 2025-03-05 DIAGNOSIS — E11.00 TYPE 2 DIABETES MELLITUS WITH HYPEROSMOLARITY WITHOUT COMA, WITHOUT LONG-TERM CURRENT USE OF INSULIN (MULTI): ICD-10-CM

## 2025-03-05 DIAGNOSIS — R73.03 PREDIABETES: ICD-10-CM

## 2025-03-05 DIAGNOSIS — I50.20 HFREF (HEART FAILURE WITH REDUCED EJECTION FRACTION): ICD-10-CM

## 2025-03-05 DIAGNOSIS — E66.813 CLASS 3 DRUG-INDUCED OBESITY WITH SERIOUS COMORBIDITY AND BODY MASS INDEX (BMI) OF 40.0 TO 44.9 IN ADULT: ICD-10-CM

## 2025-03-05 DIAGNOSIS — Z76.89 ENCOUNTER TO ESTABLISH CARE: ICD-10-CM

## 2025-03-05 LAB — POC HEMOGLOBIN A1C: 7.2 % (ref 4.2–6.5)

## 2025-03-05 PROCEDURE — 3052F HG A1C>EQUAL 8.0%<EQUAL 9.0%: CPT

## 2025-03-05 PROCEDURE — 3008F BODY MASS INDEX DOCD: CPT

## 2025-03-05 PROCEDURE — 3074F SYST BP LT 130 MM HG: CPT

## 2025-03-05 PROCEDURE — 3060F POS MICROALBUMINURIA REV: CPT

## 2025-03-05 PROCEDURE — 83036 HEMOGLOBIN GLYCOSYLATED A1C: CPT

## 2025-03-05 PROCEDURE — 99204 OFFICE O/P NEW MOD 45 MIN: CPT

## 2025-03-05 PROCEDURE — 3048F LDL-C <100 MG/DL: CPT

## 2025-03-05 PROCEDURE — 3078F DIAST BP <80 MM HG: CPT

## 2025-03-05 RX ORDER — MONTELUKAST SODIUM 10 MG/1
10 TABLET ORAL NIGHTLY
Qty: 90 TABLET | Refills: 1 | Status: SHIPPED | OUTPATIENT
Start: 2025-03-05 | End: 2025-09-01

## 2025-03-05 ASSESSMENT — PATIENT HEALTH QUESTIONNAIRE - PHQ9
1. LITTLE INTEREST OR PLEASURE IN DOING THINGS: NOT AT ALL
SUM OF ALL RESPONSES TO PHQ9 QUESTIONS 1 AND 2: 0
2. FEELING DOWN, DEPRESSED OR HOPELESS: NOT AT ALL

## 2025-03-05 ASSESSMENT — PAIN SCALES - GENERAL: PAINLEVEL_OUTOF10: 0-NO PAIN

## 2025-03-05 NOTE — PATIENT INSTRUCTIONS
If they approve once weekly injectable  Inject in the morning-hold insulin that night. May have to hold for several days.

## 2025-03-05 NOTE — PROGRESS NOTES
Subjective     Patient ID: Samina Park is a 50 y.o. female who presents for Establish Care.    HPI    Samina is here to establish care.   She was recently hospitalized for Obesity hypoventilation syndrome  Past medical h/o uncontrolled HTN, asthma, and depression     DM2- currently taking Jardiance 10 mg  insulin 10 mg nightly. On statin    HTN- Entresto, metoprolol, aldactone, lasix, aldactone.     CHF- following with cardiology-Dr. Nugent.     DARELL- following with pulm and sleep medicine    Asthma/COPD- advair 250/50, continue albuterol as needed, Singulair      Review of Systems    Social history:    reports that she has never smoked. She has never been exposed to tobacco smoke. She has never used smokeless tobacco. She reports that she does not currently use alcohol. She reports that she does not use drugs.    Past Medical History:   Diagnosis Date    ADHD (attention deficit hyperactivity disorder)     Anxiety     Asthma ?    Depression ?    Diabetes mellitus (Multi) 2025    Heart disease 2025    Hypertension     Visual impairment ?      Past Surgical History:   Procedure Laterality Date     SECTION, LOW TRANSVERSE  Oct 6, 2009 & 2001    HYSTERECTOMY   or ?    TONSILLECTOMY      as a child      Family History   Problem Relation Name Age of Onset    COPD Mother Lisa Cotton     Insomnia Mother Lisa Cotton     Alcohol abuse Father Jose Park     Learning disabilities Brother Donn Santa     Breast cancer Mother's Sister Stephania Hernandez     Alcohol abuse Mother's Brother Miguel Yury     Diabetes Maternal Grandmother Lindsey Cotton        Current Outpatient Medications:     albuterol 2.5 mg /3 mL (0.083 %) nebulizer solution, Take 3 mL (2.5 mg) by nebulization every 6 hours if needed for wheezing or shortness of breath., Disp: , Rfl:     alcohol swabs (Alcohol Pads) pads, medicated, Use 4-8 per day to check blood glucose and for injectable  "medications, Disp: 400 each, Rfl: 3    atorvastatin (Lipitor) 40 mg tablet, Take 1 tablet (40 mg) by mouth once daily at bedtime., Disp: 30 tablet, Rfl: 1    blood sugar diagnostic (Blood Glucose Test) strip, Check blood glucose 4 time per day, Disp: 400 each, Rfl: 0    blood-glucose meter misc, Use to check blood glucose, Disp: 1 each, Rfl: 0    empagliflozin (Jardiance) 10 mg, Take 1 tablet (10 mg) by mouth once daily., Disp: 30 tablet, Rfl: 1    fluticasone propion-salmeteroL (Advair Diskus) 250-50 mcg/dose diskus inhaler, Inhale 1 puff 2 times a day. Rinse mouth with water after use., Disp: 60 each, Rfl: 11    furosemide (Lasix) 20 mg tablet, Take 1 tablet (20 mg) by mouth once daily as needed (For weight gain of more than 2 pounds in 2 days with associated leg swelling.)., Disp: 30 tablet, Rfl: 1    insulin glargine (Lantus Solostar U-100 Insulin) 100 unit/mL (3 mL) pen, Inject 10 Units under the skin once daily at bedtime. Take as directed per insulin instructions., Disp: 15 mL, Rfl: 0    metoprolol succinate XL (Toprol-XL) 25 mg 24 hr tablet, Take 2 tablets (50 mg) by mouth once daily. Do not crush or chew., Disp: 180 tablet, Rfl: 3    pen needle, diabetic 32 gauge x 5/32\" needle, 1 Needle once daily., Disp: 100 each, Rfl: 0    sacubitriL-valsartan (Entresto) 49-51 mg tablet, Take 1 tablet by mouth 2 times a day., Disp: 60 tablet, Rfl: 1    spironolactone (Aldactone) 25 mg tablet, Take 1 tablet (25 mg) by mouth once daily., Disp: 30 tablet, Rfl: 1    montelukast (Singulair) 10 mg tablet, Take 1 tablet (10 mg) by mouth once daily at bedtime., Disp: 30 tablet, Rfl: 0     Allergies:   No Known Allergies     Review of systems completed and unremarkable other than what is documented in HPI.    Objective   /73 (BP Location: Left arm)   Pulse 97   Ht 1.499 m (4' 11\")   Wt 93 kg (205 lb)   SpO2 95%   BMI 41.40 kg/m²     Physical Exam    Gen: No acute distress, alert and oriented x3, pleasant   HEENT: moist " mucous membranes, b/l external auditory canals are clear of debris, TMs within normal limits, no oropharyngeal lesions, eomi, perrla   Neck: thyroid within normal limits, no lymphadenopathy   CV: RRR, normal S1/S2, no murmur   Resp: Clear to auscultation bilaterally, no wheezes or rhonchi appreciated  Abd: soft, nontender, non-distended, no guarding/rigidity, bowel sounds present  Extr: no edema, no calf tenderness  Derm: Skin is warm and dry, no rashes appreciated  Psych: mood is good, affect is congruent, good hygiene, normal speech and eye contact  Neuro: cranial nerves grossly intact, normal gait      Assessment/Plan   Diagnoses and all orders for this visit:  DARELL (obstructive sleep apnea)  -     semaglutide 0.25 mg or 0.5 mg (2 mg/3 mL) pen injector; Inject 0.25 mg under the skin 1 (one) time per week.  Prediabetes  -     semaglutide 0.25 mg or 0.5 mg (2 mg/3 mL) pen injector; Inject 0.25 mg under the skin 1 (one) time per week.  -     Hemoglobin A1c; Future  Type 2 diabetes mellitus with hyperosmolarity without coma, without long-term current use of insulin (Multi)  -     POCT glycosylated hemoglobin (Hb A1C) manually resulted  -     semaglutide 0.25 mg or 0.5 mg (2 mg/3 mL) pen injector; Inject 0.25 mg under the skin 1 (one) time per week.  Asthma, unspecified asthma severity, unspecified whether complicated, unspecified whether persistent ([facility])  -     montelukast (Singulair) 10 mg tablet; Take 1 tablet (10 mg) by mouth once daily at bedtime.  Encounter to establish care  HFrEF (heart failure with reduced ejection fraction)  -     semaglutide 0.25 mg or 0.5 mg (2 mg/3 mL) pen injector; Inject 0.25 mg under the skin 1 (one) time per week.  -     Hemoglobin A1c; Future  Class 3 drug-induced obesity with serious comorbidity and body mass index (BMI) of 40.0 to 44.9 in adult  -     semaglutide 0.25 mg or 0.5 mg (2 mg/3 mL) pen injector; Inject 0.25 mg under the skin 1 (one) time per week.  -     Hemoglobin  A1c; Future    Follow up 3 months

## 2025-03-07 NOTE — PROGRESS NOTES
"INDIVIDUAL CARDIAC TREATMENT PLAN-INITIAL ASSESSMENT     Name: Samina Park     Today's Date: 3-   : 1975     Primary Provider: Cindy Perez CNP  MRN: [id number]     Referring Physician: Marie Nugent MD     Diagnosis: I50.20 HFrEF (new dx - EF: 35-40%) Onset Date: 1-      Risk Stratification: Moderate    First exercise session:  3-  pending physician signature    NUTRITION ASSESSMENT  Lipids:   Lipid Lab Date: 1-  Total Chol:  155  HDL:  26.8  LDL:  92  Tri  Cholesterol Med: Atorvastatin 40 mg QHS    Diabetes: Yes - dx pre-diabetic  HgbA1c: 7.2  Date Checked: 3-5-2025  Monitors glucose at home: Yes  Fasting Blood Sugar Range: 110 - 130s  Frequency: 3x/day  Hypoglycemic Episode: none    Weight Management    Height: 4' 11\"  (59\")    Initial weight: 204.5 lbs BMI:  41.3 kg/m2  Discharge  weight:  lbs BMI:   kg/m2    Current Diet: Diabetic and Low Sodium    Barriers to dietary change: none    Initial Dietary Assessment Score:   67/96  Discharge Dietary Assessment Score:   /96      NUTRITION PLAN  Nutrition Goals:   1. Improve Picture Your Plate assessment results by discharge.  2. Make changes to diet to include heart healthy options while in the program.    Nutrition Intervention/Education:   3- Sent dietician Picture Your Plate assessment for recommendations. [facility]  *Perform session weight checks       OTHER CORE COMPONENTS/ RISK FACTORS ASSESSMENT  Medication compliance: good compliance  Using pill box: Yes  Carries medication list: No (discussed and patient to start carrying list in her purse)    Blood Pressure Management:  History of High BP: Yes  Resting BP: 102/68    Tobacco: NEVER  Form of tobacco: n/a  Quit Date: n/a  Anyone in the house smoke: Yes - brother: will smoke outside    Initial Knowledge Test Score:   8/15  Discharge Knowledge Test Score:  /15    OTHER CORE COMPONENTS/ RISK FACTOR PLAN   Other Core components/Risk Factor Goals:                      "                                                                                                                               1. Achieve and maintain a resting blood pressure less than 130/80 while in the program.  2. Gain knowledge of cardiac disease and lifestyle modifications related to exercise and ADL's prior to discharge.    Other Components/ Risk Factors Intervention/Education:  *Will continue to monitor HR, BP, dyspnea and arrhythmias each session.   *Will meet with patient to discuss goals & progress.  *Encouraged review of education materials.       PSYCHOSOCIAL ASSESSMENT  Patient reported stress level: moderate  Using stress management skills: No  HX of anxiety: Yes  HX of depression: Yes  Patient reported any new stress, depression and anxiety symptoms: No    Family/Support System: family   Seeing mental health provider: No  Psychosocial medications: none    Initial PHQ-9 score:  5/27 (Mild Depression)  [place] PHQ-9 score:  /27  Discharge PHQ-9 score:  /27  Was PHQ-9 sent to provider: No  Date sent: n/a    Quality of Life - [facility] Score -  Initial:  15/45 Discharge:   /45    Stages of change:  Action    PSYCHOSOCIAL PLAN  Psychosocial Goals:  1. Improve stage of change while in the program.  2. Initial score - Mild Depression on PHQ-9 category classification - will decrease or maintain classification while in the program.     Psychosocial Interventions/ Education:  Provided one on one emotional support and will facilitate peer support within the context of other phase II patients while in the program.   Re-administer PHQ-9 score and discuss results with patient mid & end of program  Discuss stress: what it is, symptoms, and stress management techniques.  Provide Stress Management pocket pal.     EXERCISE ASSESSMENT  Home Exercise: Yes  Frequency:  2-3 days / week  Mode: gym ([employer])    EXERCISE PLAN  Exercise Goals:   1. Goal of 6 METs by discharge.  2. Have a plan in place for continued  exercise after the program by discharge.    Exercise Prescription:   Based on DASI Score - Initial:  18.95 Discharge:   Frequency: 3 days per week  Duration (total aerobic min.): 32-60 minutes  Intensity RPE: 11-14  Target HR: to be calculated after 6th session (resting +30 bpm)  MET Level Range: 2.5 to 6     Modality METS Load  Duration   1 Warm Up    02:00   2 Arm Ergometer 2.5 2.5 40-50 rpm 08:00   3 NuStep 2.5 2 70-80 spm 08:00   4 Recumbent Bike 2.5 4 55-65 rpm 08:00   5 Treadmill 2.5 1.9 mph 0% incline 08:00   6 Cool Down     02:00     Exercise Intervention/Education:   Exercise duration increased by 1 minute / modality for a total of 32 - 60 minutes  Exercise intensity increased based on THR/RPE/BP response to exercise  *Incorporate resistance training for muscular endurance and strength.    Date of first exercise session: 3- Pending MD signature      LEARNING ASSESSMENT & BARRIERS  Readiness to Learn:  Barriers: None  Comments: Patient states that ADD will sometimes make her forget some new information.     FALL RISK  moderate  Comments: Patient fell x2 in last 6 months - 1 event due to ice / second event due to undiagnosed heart failure.    INDIVIDUAL PATIENT GOALS: by end of program patient will -   Increase knowledge regarding exercise routine and set exercise schedule - 3-5 days / week (measured by patient reporting)  2.     Increase knowledge r/t nutrition, introduce Mediterranean Meal Plan - meet with dietician to set goals (measured by increase in PYP score and/or patient reporting)    MEDICATIONS  Current Outpatient Medications   Medication Instructions    albuterol 2.5 mg, Every 6 hours PRN    alcohol swabs (Alcohol Pads) pads, medicated Use 4-8 per day to check blood glucose and for injectable medications    atorvastatin (LIPITOR) 40 mg, oral, Nightly    blood sugar diagnostic (Blood Glucose Test) strip Check blood glucose 4 time per day    blood-glucose meter misc Use to check blood glucose  "   fluticasone propion-salmeteroL (Advair Diskus) 250-50 mcg/dose diskus inhaler 1 puff, inhalation, 2 times daily RT, Rinse mouth with water after use.    furosemide (LASIX) 20 mg, oral, Daily PRN    Jardiance 10 mg, oral, Daily    Lantus Solostar U-100 Insulin 10 Units, subcutaneous, Nightly, Take as directed per insulin instructions.    metoprolol succinate XL (TOPROL-XL) 50 mg, oral, Daily, Do not crush or chew.    montelukast (SINGULAIR) 10 mg, oral, Nightly    pen needle, diabetic 32 gauge x 5/32\" needle 1 Needle, miscellaneous, Daily    sacubitriL-valsartan (Entresto) 49-51 mg tablet 1 tablet, oral, 2 times daily    semaglutide 0.25 mg, subcutaneous, Weekly    spironolactone (ALDACTONE) 25 mg, oral, Daily    3- Patient has not started Semaglutide (waiting on insurance approval) & not taking Singulair - needs refilled. [facility]    During Cardiac rehab program:   ER Visits: n/a  Hospitalizations: n/a    STAFF COMMENTS:   Scheduled appointments:  3- PFT & Oximetry Testing  3- Heart Failure Physician  3- Sleep Study  5- FU with Pulmonologist      "

## 2025-03-07 NOTE — PROGRESS NOTES
CARDIAC ASSESSMENT     Name: Samina Park   : 1975   Diagnosis: I50.20 HFrEF  MRN: [id number]   Onset Date:  1-   Today's Date: 3-      Cardiovascular   HX: HTN and HLD  Family HX of CAD:  No  Angina: n/a  Describe: n/a  Last Episode: n/a  History of Heart Failure: No  EF: 35% - 39% 1-  Onset of HF: 1-  Last HF hospitalization: 2025  Family HX of HF:  No  HF symptoms: Fatigue at rest and Fatigue with exertion and SOB at all times    Devices:  none  HX of PAD: No    Arrythmias: sinus tachycardia, scmp=400  Rhythm: NSR  Apical: regular  Heart Rate: 88  BP: 102/68  Radial pulses:  R Present 1+ L Present 1+    Comments:      Respiratory  HX: Asthma 3- Patient completed PFT & Oximetry Testing. [facility]  Has FU appointment with pulmonologist on 2025. [facility]  Dyspnea:  No  Describe: n/a  HX DARELL:  Has sleep study scheduled for 3-  CPAP Use:  No  Family History of Lung Disease:  Yes - mother COPD, Aunt COPD    Resting O2 sat: 97% on room air  Lung Sounds:  clear  Locations: all lobes    Flu Vaccine: No  Covid Vaccine: Yes  Pneumonia Vaccine: No  Shingles Vaccine: No    Comments: May Score:  Initial: 15    Discharge:   3- Patient has sleep study scheduled on 3-. [facility]    Neurological   Orientation: oriented to person, place, time, and general circumstances  HX: none  History of stroke/TIA?: no hx    Comments:    Skin  Skin Color: normal, no cyanosis, jaundice, pallor or bruising  Edema:  none      Comments:    Gastrointestinal/Genitourinary  HX: denies  Comments:    Psychosocial  Marital status: single  Children: 2 - daughter & Son  Lives alone:  No  Lives with: children, brother & nephew  Drives:  Yes  Occupation: is employed full time as a  .  Occupational demands include frequent sitting   Caretaker of family member?:  Yes  Do you feel safe at home?:  Yes    Caffeinated drinks per day: rare  Alcoholic drinks per day/week:  social  HX drug or alcohol abuse: No  Current use of illicit drugs: No  Marijuana use: No    Comments:    Musculoskeletal  HX of injury/surgery: none    Comments:    Pain Assessment  Current pain:  denies  Location: n/a  Description: n/a    Comments:    Fall Risk Assessment  Assistive device: no device  Needs assistance:  No  Afraid of falling:  No  Fall within the past 6 months?: Yes  Injured with fall: on ice x1 event & second time prior to HF diagnosis - no injuries with either event  Fall risk results: moderate

## 2025-03-10 ENCOUNTER — LAB (OUTPATIENT)
Dept: LAB | Facility: HOSPITAL | Age: 50
End: 2025-03-10
Payer: MEDICAID

## 2025-03-10 ENCOUNTER — HOSPITAL ENCOUNTER (OUTPATIENT)
Dept: RESPIRATORY THERAPY | Facility: HOSPITAL | Age: 50
Discharge: HOME | End: 2025-03-10
Payer: MEDICAID

## 2025-03-10 ENCOUNTER — CLINICAL SUPPORT (OUTPATIENT)
Dept: CARDIAC REHAB | Facility: HOSPITAL | Age: 50
End: 2025-03-10
Payer: MEDICAID

## 2025-03-10 DIAGNOSIS — I50.20 HFREF (HEART FAILURE WITH REDUCED EJECTION FRACTION): Primary | ICD-10-CM

## 2025-03-10 DIAGNOSIS — J45.909 ASTHMA, UNSPECIFIED ASTHMA SEVERITY, UNSPECIFIED WHETHER COMPLICATED, UNSPECIFIED WHETHER PERSISTENT (HHS-HCC): ICD-10-CM

## 2025-03-10 DIAGNOSIS — I50.20 HFREF (HEART FAILURE WITH REDUCED EJECTION FRACTION): ICD-10-CM

## 2025-03-10 PROCEDURE — RXMED WILLOW AMBULATORY MEDICATION CHARGE

## 2025-03-10 PROCEDURE — 94640 AIRWAY INHALATION TREATMENT: CPT | Mod: 59

## 2025-03-10 PROCEDURE — 94618 PULMONARY STRESS TESTING: CPT

## 2025-03-10 PROCEDURE — 94618 PULMONARY STRESS TESTING: CPT | Performed by: PEDIATRICS

## 2025-03-10 PROCEDURE — 94729 DIFFUSING CAPACITY: CPT | Performed by: PEDIATRICS

## 2025-03-10 PROCEDURE — 94060 EVALUATION OF WHEEZING: CPT | Performed by: PEDIATRICS

## 2025-03-10 PROCEDURE — 94726 PLETHYSMOGRAPHY LUNG VOLUMES: CPT

## 2025-03-10 PROCEDURE — 94729 DIFFUSING CAPACITY: CPT

## 2025-03-10 PROCEDURE — 94726 PLETHYSMOGRAPHY LUNG VOLUMES: CPT | Performed by: PEDIATRICS

## 2025-03-10 PROCEDURE — 94060 EVALUATION OF WHEEZING: CPT

## 2025-03-10 PROCEDURE — 2500000002 HC RX 250 W HCPCS SELF ADMINISTERED DRUGS (ALT 637 FOR MEDICARE OP, ALT 636 FOR OP/ED): Mod: SE | Performed by: PEDIATRICS

## 2025-03-10 PROCEDURE — 94664 DEMO&/EVAL PT USE INHALER: CPT | Mod: 59

## 2025-03-10 RX ORDER — ALBUTEROL SULFATE 0.83 MG/ML
3 SOLUTION RESPIRATORY (INHALATION) ONCE
Status: COMPLETED | OUTPATIENT
Start: 2025-03-10 | End: 2025-03-10

## 2025-03-10 RX ORDER — ALBUTEROL SULFATE 90 UG/1
1 INHALANT RESPIRATORY (INHALATION) ONCE
Status: COMPLETED | OUTPATIENT
Start: 2025-03-10 | End: 2025-03-10

## 2025-03-10 RX ADMIN — ALBUTEROL SULFATE 3 ML: 2.5 SOLUTION RESPIRATORY (INHALATION) at 09:10

## 2025-03-11 LAB
ANION GAP SERPL CALCULATED.4IONS-SCNC: 13 MMOL/L (CALC) (ref 7–17)
BNP SERPL-MCNC: 9 PG/ML
BUN SERPL-MCNC: 12 MG/DL (ref 7–25)
BUN/CREAT SERPL: ABNORMAL (CALC) (ref 6–22)
CALCIUM SERPL-MCNC: 9.9 MG/DL (ref 8.6–10.4)
CHLORIDE SERPL-SCNC: 102 MMOL/L (ref 98–110)
CO2 SERPL-SCNC: 26 MMOL/L (ref 20–32)
CREAT SERPL-MCNC: 0.88 MG/DL (ref 0.5–1.03)
EGFRCR SERPLBLD CKD-EPI 2021: 80 ML/MIN/1.73M2
EST. AVERAGE GLUCOSE BLD GHB EST-MCNC: 160 MG/DL
EST. AVERAGE GLUCOSE BLD GHB EST-SCNC: 8.9 MMOL/L
GLUCOSE SERPL-MCNC: 115 MG/DL (ref 65–99)
HBA1C MFR BLD: 7.2 % OF TOTAL HGB
KAPPA LC SERPL-MCNC: 2.43 MG/DL (ref 0.33–1.94)
KAPPA LC/LAMBDA SER: 1.05 {RATIO} (ref 0.26–1.65)
LAMBDA LC SERPL-MCNC: 2.32 MG/DL (ref 0.57–2.63)
MGC ASCENT PFT - FEV1 - POST: 1.72
MGC ASCENT PFT - FEV1 - PRE: 1.37
MGC ASCENT PFT - FEV1 - PREDICTED: 2.29
MGC ASCENT PFT - FVC - POST: 2.39
MGC ASCENT PFT - FVC - PRE: 2.03
MGC ASCENT PFT - FVC - PREDICTED: 2.77
POTASSIUM SERPL-SCNC: 4.1 MMOL/L (ref 3.5–5.3)
SODIUM SERPL-SCNC: 141 MMOL/L (ref 135–146)

## 2025-03-12 ENCOUNTER — PHARMACY VISIT (OUTPATIENT)
Dept: PHARMACY | Facility: CLINIC | Age: 50
End: 2025-03-12
Payer: MEDICAID

## 2025-03-12 DIAGNOSIS — E11.00 TYPE 2 DIABETES MELLITUS WITH HYPEROSMOLARITY WITHOUT COMA, WITHOUT LONG-TERM CURRENT USE OF INSULIN (MULTI): ICD-10-CM

## 2025-03-12 NOTE — PROGRESS NOTES
Subjective   Patient ID: Samina Park is a 50 y.o. female who presents for No chief complaint on file..    HPI     DM2- currently taking Jardiance 10 mg  insulin 10 mg nightly. On statin     HTN- Entresto, metoprolol, aldactone, lasix, aldactone.      CHF- following with cardiology-Dr. Nugent.      DARELL- following with pulm and sleep medicine     Asthma/COPD- advair 250/50, continue albuterol as needed, Singulair    Review of systems completed and unremarkable other than what is documented in HPI.     Objective   There were no vitals taken for this visit.    No data recorded    Gen: No acute distress, alert and oriented x3, pleasant   HEENT: moist mucous membranes, b/l external auditory canals are clear of debris, TMs within normal limits, no oropharyngeal lesions, eomi, perrla   Neck: thyroid within normal limits, no lymphadenopathy   CV: RRR, normal S1/S2, no murmur   Resp: Clear to auscultation bilaterally, no wheezes or rhonchi appreciated  Abd: soft, nontender, non-distended, no guarding/rigidity, bowel sounds present  Extr: no edema, no calf tenderness  Derm: Skin is warm and dry, no rashes appreciated  Psych: mood is good, affect is congruent, good hygiene, normal speech and eye contact  Neuro: cranial nerves grossly intact, normal gait  Physical Exam    Assessment/Plan   {Assess/PlanSmartLinks:37932}    DARELL (obstructive sleep apnea)  -     semaglutide 0.25 mg or 0.5 mg (2 mg/3 mL) pen injector; Inject 0.25 mg under the skin 1 (one) time per week.  Prediabetes  -     semaglutide 0.25 mg or 0.5 mg (2 mg/3 mL) pen injector; Inject 0.25 mg under the skin 1 (one) time per week.  -     Hemoglobin A1c  Type 2 diabetes mellitus with hyperosmolarity without coma, without long-term current use of insulin (Multi)  -     POCT glycosylated hemoglobin (Hb A1C) manually resulted  -     semaglutide 0.25 mg or 0.5 mg (2 mg/3 mL) pen injector; Inject 0.25 mg under the skin 1 (one) time per week.  Asthma, unspecified asthma  severity, unspecified whether complicated, unspecified whether persistent ([facility])  -     montelukast (Singulair) 10 mg tablet; Take 1 tablet (10 mg) by mouth once daily at bedtime.  Encounter to establish care  HFrEF (heart failure with reduced ejection fraction)  -     semaglutide 0.25 mg or 0.5 mg (2 mg/3 mL) pen injector; Inject 0.25 mg under the skin 1 (one) time per week.  -     Hemoglobin A1c  Class 3 drug-induced obesity with serious comorbidity and body mass index (BMI) of 40.0 to 44.9 in adult  -     semaglutide 0.25 mg or 0.5 mg (2 mg/3 mL) pen injector; Inject 0.25 mg under the skin 1 (one) time per week.  -     Hemoglobin A1c     Follow up 3 months

## 2025-03-13 RX ORDER — DULAGLUTIDE 0.75 MG/.5ML
0.75 INJECTION, SOLUTION SUBCUTANEOUS WEEKLY
Qty: 2 ML | Refills: 11 | Status: SHIPPED | OUTPATIENT
Start: 2025-03-13 | End: 2025-03-14 | Stop reason: SDUPTHER

## 2025-03-14 ENCOUNTER — PATIENT MESSAGE (OUTPATIENT)
Dept: PRIMARY CARE | Facility: CLINIC | Age: 50
End: 2025-03-14
Payer: MEDICAID

## 2025-03-14 DIAGNOSIS — E11.00 TYPE 2 DIABETES MELLITUS WITH HYPEROSMOLARITY WITHOUT COMA, WITHOUT LONG-TERM CURRENT USE OF INSULIN (MULTI): ICD-10-CM

## 2025-03-14 RX ORDER — DULAGLUTIDE 0.75 MG/.5ML
0.75 INJECTION, SOLUTION SUBCUTANEOUS WEEKLY
Qty: 2 ML | Refills: 11 | Status: SHIPPED | OUTPATIENT
Start: 2025-03-14

## 2025-03-15 ENCOUNTER — PATIENT MESSAGE (OUTPATIENT)
Dept: PRIMARY CARE | Facility: CLINIC | Age: 50
End: 2025-03-15
Payer: MEDICAID

## 2025-03-15 DIAGNOSIS — E11.69 TYPE 2 DIABETES MELLITUS WITH OTHER SPECIFIED COMPLICATION, WITHOUT LONG-TERM CURRENT USE OF INSULIN: ICD-10-CM

## 2025-03-16 RX ORDER — PEN NEEDLE, DIABETIC 30 GX3/16"
1 NEEDLE, DISPOSABLE MISCELLANEOUS DAILY
Qty: 100 EACH | Refills: 0 | Status: SHIPPED | OUTPATIENT
Start: 2025-03-16 | End: 2025-06-28

## 2025-03-17 ENCOUNTER — CLINICAL SUPPORT (OUTPATIENT)
Dept: CARDIAC REHAB | Facility: HOSPITAL | Age: 50
End: 2025-03-17
Payer: MEDICAID

## 2025-03-17 DIAGNOSIS — I50.20 HFREF (HEART FAILURE WITH REDUCED EJECTION FRACTION): ICD-10-CM

## 2025-03-17 PROCEDURE — 93798 PHYS/QHP OP CAR RHAB W/ECG: CPT | Performed by: INTERNAL MEDICINE

## 2025-03-17 PROCEDURE — RXMED WILLOW AMBULATORY MEDICATION CHARGE

## 2025-03-17 RX ORDER — PEN NEEDLE, DIABETIC 30 GX3/16"
1 NEEDLE, DISPOSABLE MISCELLANEOUS DAILY
Qty: 100 EACH | Refills: 0 | Status: SHIPPED | OUTPATIENT
Start: 2025-03-17 | End: 2025-05-16

## 2025-03-19 ENCOUNTER — APPOINTMENT (OUTPATIENT)
Dept: CARDIAC REHAB | Facility: HOSPITAL | Age: 50
End: 2025-03-19
Payer: MEDICAID

## 2025-03-19 ENCOUNTER — APPOINTMENT (OUTPATIENT)
Dept: PRIMARY CARE | Facility: CLINIC | Age: 50
End: 2025-03-19
Payer: MEDICAID

## 2025-03-19 VITALS
WEIGHT: 203.4 LBS | BODY MASS INDEX: 41.08 KG/M2 | HEART RATE: 80 BPM | SYSTOLIC BLOOD PRESSURE: 107 MMHG | DIASTOLIC BLOOD PRESSURE: 73 MMHG

## 2025-03-19 DIAGNOSIS — E11.69 TYPE 2 DIABETES MELLITUS WITH OTHER SPECIFIED COMPLICATION, WITHOUT LONG-TERM CURRENT USE OF INSULIN: ICD-10-CM

## 2025-03-19 DIAGNOSIS — Z00.00 HEALTH CARE MAINTENANCE: Primary | ICD-10-CM

## 2025-03-19 PROCEDURE — 3060F POS MICROALBUMINURIA REV: CPT

## 2025-03-19 PROCEDURE — 3078F DIAST BP <80 MM HG: CPT

## 2025-03-19 PROCEDURE — 3052F HG A1C>EQUAL 8.0%<EQUAL 9.0%: CPT

## 2025-03-19 PROCEDURE — 1036F TOBACCO NON-USER: CPT

## 2025-03-19 PROCEDURE — 3048F LDL-C <100 MG/DL: CPT

## 2025-03-19 PROCEDURE — 3074F SYST BP LT 130 MM HG: CPT

## 2025-03-19 PROCEDURE — 99214 OFFICE O/P EST MOD 30 MIN: CPT

## 2025-03-19 RX ORDER — IPRATROPIUM BROMIDE AND ALBUTEROL 20; 100 UG/1; UG/1
SPRAY, METERED RESPIRATORY (INHALATION)
COMMUNITY
Start: 2025-03-11

## 2025-03-19 RX ORDER — METFORMIN HYDROCHLORIDE 500 MG/1
500 TABLET ORAL
Qty: 200 TABLET | Refills: 3 | Status: SHIPPED | OUTPATIENT
Start: 2025-03-19 | End: 2026-04-23

## 2025-03-19 NOTE — PATIENT INSTRUCTIONS
Stop insulin today    Start 1 metformin after second trulicity shot,  continue 1 pill a day for a week then increase to 2 a day.   If GI side effects start may decrease back to 1 metfomin. If they continue may take a 2 week break and restart 1 pill.

## 2025-03-21 ENCOUNTER — CLINICAL SUPPORT (OUTPATIENT)
Dept: CARDIAC REHAB | Facility: HOSPITAL | Age: 50
End: 2025-03-21
Payer: MEDICAID

## 2025-03-21 DIAGNOSIS — I50.20 HFREF (HEART FAILURE WITH REDUCED EJECTION FRACTION): ICD-10-CM

## 2025-03-21 PROCEDURE — 93798 PHYS/QHP OP CAR RHAB W/ECG: CPT | Performed by: CLINICAL NURSE SPECIALIST

## 2025-03-24 ENCOUNTER — APPOINTMENT (OUTPATIENT)
Dept: CARDIAC REHAB | Facility: HOSPITAL | Age: 50
End: 2025-03-24
Payer: MEDICAID

## 2025-03-24 ENCOUNTER — OFFICE VISIT (OUTPATIENT)
Dept: CARDIOLOGY | Facility: CLINIC | Age: 50
End: 2025-03-24
Payer: MEDICAID

## 2025-03-24 ENCOUNTER — CLINICAL SUPPORT (OUTPATIENT)
Dept: SLEEP MEDICINE | Facility: CLINIC | Age: 50
End: 2025-03-24
Payer: MEDICAID

## 2025-03-24 ENCOUNTER — PHARMACY VISIT (OUTPATIENT)
Dept: PHARMACY | Facility: CLINIC | Age: 50
End: 2025-03-24
Payer: COMMERCIAL

## 2025-03-24 VITALS
BODY MASS INDEX: 40.92 KG/M2 | WEIGHT: 203 LBS | OXYGEN SATURATION: 92 % | HEART RATE: 77 BPM | HEIGHT: 59 IN | SYSTOLIC BLOOD PRESSURE: 109 MMHG | DIASTOLIC BLOOD PRESSURE: 70 MMHG

## 2025-03-24 DIAGNOSIS — I50.20 HFREF (HEART FAILURE WITH REDUCED EJECTION FRACTION): Primary | ICD-10-CM

## 2025-03-24 DIAGNOSIS — I50.9 HEART FAILURE, UNSPECIFIED HF CHRONICITY, UNSPECIFIED HEART FAILURE TYPE: ICD-10-CM

## 2025-03-24 DIAGNOSIS — R00.2 PALPITATIONS: ICD-10-CM

## 2025-03-24 DIAGNOSIS — I50.20 HFREF (HEART FAILURE WITH REDUCED EJECTION FRACTION): ICD-10-CM

## 2025-03-24 DIAGNOSIS — I42.8 NON-ISCHEMIC CARDIOMYOPATHY (MULTI): ICD-10-CM

## 2025-03-24 DIAGNOSIS — E66.01 MORBID OBESITY WITH BMI OF 40.0-44.9, ADULT (MULTI): ICD-10-CM

## 2025-03-24 DIAGNOSIS — G47.30 SLEEP-DISORDERED BREATHING: ICD-10-CM

## 2025-03-24 PROCEDURE — 3008F BODY MASS INDEX DOCD: CPT | Performed by: INTERNAL MEDICINE

## 2025-03-24 PROCEDURE — 3078F DIAST BP <80 MM HG: CPT | Performed by: INTERNAL MEDICINE

## 2025-03-24 PROCEDURE — 3074F SYST BP LT 130 MM HG: CPT | Performed by: INTERNAL MEDICINE

## 2025-03-24 PROCEDURE — 99214 OFFICE O/P EST MOD 30 MIN: CPT | Performed by: INTERNAL MEDICINE

## 2025-03-24 PROCEDURE — 1036F TOBACCO NON-USER: CPT | Performed by: INTERNAL MEDICINE

## 2025-03-24 PROCEDURE — RXMED WILLOW AMBULATORY MEDICATION CHARGE

## 2025-03-24 RX ORDER — FUROSEMIDE 20 MG/1
20 TABLET ORAL DAILY PRN
Qty: 90 TABLET | Refills: 3 | Status: SHIPPED | OUTPATIENT
Start: 2025-03-24 | End: 2026-03-24

## 2025-03-24 RX ORDER — SPIRONOLACTONE 25 MG/1
25 TABLET ORAL DAILY
Qty: 90 TABLET | Refills: 3 | Status: SHIPPED | OUTPATIENT
Start: 2025-03-24 | End: 2026-03-24

## 2025-03-24 RX ORDER — ATORVASTATIN CALCIUM 40 MG/1
40 TABLET, FILM COATED ORAL NIGHTLY
Qty: 90 TABLET | Refills: 3 | Status: SHIPPED | OUTPATIENT
Start: 2025-03-24 | End: 2026-03-24

## 2025-03-24 RX ORDER — METOPROLOL SUCCINATE 25 MG/1
100 TABLET, EXTENDED RELEASE ORAL DAILY
Qty: 90 TABLET | Refills: 1 | Status: SHIPPED | OUTPATIENT
Start: 2025-03-24 | End: 2025-03-24 | Stop reason: SDUPTHER

## 2025-03-24 RX ORDER — METOPROLOL SUCCINATE 100 MG/1
100 TABLET, EXTENDED RELEASE ORAL DAILY
Qty: 90 TABLET | Refills: 3 | Status: SHIPPED | OUTPATIENT
Start: 2025-03-24 | End: 2026-03-24

## 2025-03-24 ASSESSMENT — SLEEP AND FATIGUE QUESTIONNAIRES
SITING INACTIVE IN A PUBLIC PLACE LIKE A CLASS ROOM OR A MOVIE THEATER: MODERATE CHANCE OF DOZING
HOW LIKELY ARE YOU TO NOD OFF OR FALL ASLEEP IN A CAR, WHILE STOPPED FOR A FEW MINUTES IN TRAFFIC: WOULD NEVER DOZE
HOW LIKELY ARE YOU TO NOD OFF OR FALL ASLEEP WHILE WATCHING TV: MODERATE CHANCE OF DOZING
HOW LIKELY ARE YOU TO NOD OFF OR FALL ASLEEP WHILE SITTING AND TALKING TO SOMEONE: SLIGHT CHANCE OF DOZING
HOW LIKELY ARE YOU TO NOD OFF OR FALL ASLEEP WHILE SITTING AND READING: HIGH CHANCE OF DOZING
HOW LIKELY ARE YOU TO NOD OFF OR FALL ASLEEP WHEN YOU ARE A PASSENGER IN A CAR FOR AN HOUR WITHOUT A BREAK: MODERATE CHANCE OF DOZING
HOW LIKELY ARE YOU TO NOD OFF OR FALL ASLEEP WHILE SITTING QUIETLY AFTER LUNCH WITHOUT ALCOHOL: MODERATE CHANCE OF DOZING
HOW LIKELY ARE YOU TO NOD OFF OR FALL ASLEEP WHILE LYING DOWN TO REST IN THE AFTERNOON WHEN CIRCUMSTANCES PERMIT: MODERATE CHANCE OF DOZING
ESS-CHAD TOTAL SCORE: 14

## 2025-03-24 ASSESSMENT — PAIN SCALES - GENERAL
PAINLEVEL_OUTOF10: 0-NO PAIN
PAINLEVEL_OUTOF10: 0-NO PAIN

## 2025-03-24 NOTE — PATIENT INSTRUCTIONS
Thank you for coming to see us today! To reach Dr. Nugent's office please call [phone]. Fax [phone]. Call [phone] to schedule an appointment. You may also contact the HF RNs at [email or web address]  (Please include your name and date of birth)  For MEDICATION REFILLS, please call [phone] option 6 then option 1.    INCREASE Entresto to 97-103mg twice a day.  We will renew you heart medications today.  Please obtain a Cardiac MRI at the end of June/beginning of July. Call [phone] to schedule.  Please wear a holter monitor for one week. Call [phone] to schedule.  Schedule a follow up with Dr. Nugent mid July 2025.

## 2025-03-24 NOTE — PROGRESS NOTES
Denies fatigue, chest pain, chest pressure, palpitations, shortness of breath, dyspnea on exertion, orthopnea, PND. No edema noted in BLE.   Denies headaches, dizziness, lightheadedness, and falls.    Patient denies all symptoms and states she has been feeling well.

## 2025-03-24 NOTE — PROGRESS NOTES
Heart Failure Cardiology    Samina Park is a 50 y.o. 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 Lives with her brother and her kids (daughter teenager, son adult, nephew/her brother's son who is a child).    Since I last saw her she has done well. She has increased dose of metoprolol on her own to 100mg every day.    She feels better with less dyspnea on exertion. She denies swelling.      Studies:    Tech PYP scan 1/22/2025  1. Amyloid SPECT heart study reveals no definite evidence suggestive of TTR amyloidosis.  2. Representative images were saved to the PACS system.    Chest CT/PE 1/15/2025:  1.  No pulmonary embolism identified.  2. Severe cardiomegaly with large pericardial effusion. Reflux of contrast into the distended hepatic veins suggests elevated right heart pressures.  3. 3 mm right lower lobe nodule. Mildly enlarged subcarinal lymph nodes perhaps reactive. Consider follow-up CT chest in 1 year to document stability.  4. Partially imaged upper abdominal ascites and anasarca.    Echocardiogram 1/15/2025   1. Poorly visualized anatomical structures due to suboptimal image quality.   2. Left ventricular ejection fraction is moderately decreased, by visual estimate at 35-40%.   3. Spectral Doppler shows an abnormal pattern of left ventricular diastolic filling.   4. There is normal right ventricular global systolic function.   5. The left atrium is moderately dilated.   6. There is a small pericardial effusion.     Cardiac MRI 1/20/2025 ([facility]ink[facility])  1.  Normal LV size (EDVi 92 ml/m2) with severely reduced systolic function (LVEF 26%).  2.  Global hypokinesis with minor regional variation.  3.  Mild LVH (1.2-1.3 cm, septum)  4. Regionally elevated myocardial T2 values suggestive of possible myocardial edema/inflammation.  5.  Increased ECV 32%, suggestive of diffuse myocardial fibrosis vs edema.  6.  LGE imaging reveals diffuse global enhancement  "suggestive of diffuse fibrosis vs infiltration/amyloid.  7.  Moderately dilated RV with moderately impaired systolic function (RVEF 32%).  8.  Dilated pulmonary arteries. Correlate/concern for elevated pulmonary pressures.  9.  Moderate pericardial effusion with the largest pocket around the left ventricle measuring 1.4 cm. Normal pericardial thickness. No evidence of respirophasic septal shift or RV/RA collapse to suggest tamponade.  10. No evidence of significant intracardiac shunt (Qp:Qs 0.94).      The CMR demonstrates non-ischemic cardiomyopathy with severe LV systolic dysfunction and diffuse LGE with focally elevated T2 values. Differentials include but are not limited to myocardial infiltration/amyloid vs myocarditis/inflammatory cardiomyopathies.    Exam: /70 (BP Location: Left arm, Patient Position: Sitting, BP Cuff Size: Large adult)   Pulse 77   Ht 1.499 m (4' 11\")   Wt 92.1 kg (203 lb)   SpO2 92%   BMI 41.00 kg/m²   Pleasant, alert, oriented, no distress  Obesity  CTA  RRR  no murmur  No LE edema    Current Outpatient Medications   Medication Instructions    alcohol swabs (Alcohol Pads) pads, medicated Use 4-8 per day to check blood glucose and for injectable medications    atorvastatin (LIPITOR) 40 mg, oral, Nightly    blood sugar diagnostic (Blood Glucose Test) strip Check blood glucose 4 time per day    blood-glucose meter misc Use to check blood glucose    fluticasone propion-salmeteroL (Advair Diskus) 250-50 mcg/dose diskus inhaler 1 puff, inhalation, 2 times daily RT, Rinse mouth with water after use.    furosemide (LASIX) 20 mg, oral, Daily PRN    Jardiance 10 mg, oral, Daily    metFORMIN (GLUCOPHAGE) 500 mg, oral, 2 times daily (morning and late afternoon)    metoprolol succinate XL (TOPROL-XL) 100 mg, oral, Daily, Do not crush or chew.    montelukast (SINGULAIR) 10 mg, oral, Nightly    pen needle, diabetic (TRUEplus Pen Needle) 32 gauge x 5/32\" needle 1 Needle, miscellaneous, Daily    " "pen needle, diabetic (TRUEplus Pen Needle) 32 gauge x 5/32\" needle 1 Needle, miscellaneous, Daily    sacubitriL-valsartan (Entresto) 49-51 mg tablet 1 tablet, oral, 2 times daily    spironolactone (ALDACTONE) 25 mg, oral, Daily    Trulicity 0.75 mg, subcutaneous, Weekly     Past medical history (non-cardiac):  -- Obesity (BMI 42), recently started on trulicity  -- Hypertension  -- Asthma  -- Depression  -- DARELL  -- DM2, not well controlled  -- History of ETOH/julio césar drinking    Cardiovascular history:  -- Cardiomyopathy, presumed non-ischemic (diagnosed 1/2025).  Evaluation for amyloidosis as contributor has been negative (negative tech PYP and negative free light chains)  -- HFrEF, Stage C, NYHA class II     Assessment: 50 y.o. WF, with cardiomyopathy (presumed non-ischemic) with biventricular dysfunction, and stage C heart failure.  On quadruple therapy but not an optimal dosages. +Occasional palpitations.    Plan:  -- Increase does of Entresto to 97/103mg BID  -- Scheduled for a sleep study this month  -- She's participating with cardiac rehab  -- Aim to get another cMRI in 3 months (echocardiogram images were of poor quality due to body habitus in 1/2025) to assess for any improvement in LV function  -- Wear holter monitor  -- RTC in 3 months, following cMRI      Marie Nugent MD, MPH  [facility] Failure [facility] Transplant [facility]iology ([facility])  [facility]  [place], [place]  "

## 2025-03-25 VITALS
DIASTOLIC BLOOD PRESSURE: 53 MMHG | RESPIRATION RATE: 14 BRPM | HEART RATE: 68 BPM | SYSTOLIC BLOOD PRESSURE: 101 MMHG | OXYGEN SATURATION: 99 %

## 2025-03-25 NOTE — PROGRESS NOTES
[facility] TECH NOTE:     Patient: Samina Park   MRN//AGE: [id number]  1975  50 y.o.   Technologist: Jacquelyn Colindres RRT-SDS   Room: 106   Service Date: 3/24/2025        Sleep Testing Location: [facility]:     TECHNOLOGIST SLEEP STUDY PROCEDURE NOTE:   This sleep study is being conducted according to the policies and procedures outlined by the AAS accreditation standards.  The sleep study procedure and processes involved during this appointment was explained to the patient/patient’s family, questions were answered. The patient/family verbalized understanding.      The patient is a 50 y.o. year old female scheduled for a Diagnostic PSG Split night. she arrived for her appointment.      The study that was ultimately completed was a Diagnostic PSG .    The full study Was completed.  Patient questionnaires completed?: yes     Consents signed? yes    Initial Fall Risk Screening:     Samina has not fallen in the last 6 months. her did not result in injury. Samina does not have a fear of falling. He does not need assistance with sitting, standing, or walking. she does not need assistance walking in her home. she does not need assistance in an unfamiliar setting. The patient is notusing an assistive device.     Brief Study observations: Patient did not qualify for a Split study tonight. Patient had Arousals of Respiratory, Spontaneous and Limb movements. Patients Respiratory events were Hypopneas and Flow limitations. Patient had intermittent, mild snoring. Patient was up 1 time throughout the night. Patient did go into REM.     Deviation to order/protocol and reason:       If PAP, which was preferred mask/pressure/mode:       Other:None    After the procedure, the patient/family was informed to ensure followup with ordering clinician for testing results.      Technologist: Jacquelyn Colindres RRT-SDS

## 2025-03-26 ENCOUNTER — PHARMACY VISIT (OUTPATIENT)
Dept: PHARMACY | Facility: CLINIC | Age: 50
End: 2025-03-26
Payer: MEDICAID

## 2025-03-26 ENCOUNTER — APPOINTMENT (OUTPATIENT)
Dept: CARDIAC REHAB | Facility: HOSPITAL | Age: 50
End: 2025-03-26
Payer: MEDICAID

## 2025-03-28 ENCOUNTER — APPOINTMENT (OUTPATIENT)
Dept: CARDIAC REHAB | Facility: HOSPITAL | Age: 50
End: 2025-03-28
Payer: MEDICAID

## 2025-03-29 PROCEDURE — RXMED WILLOW AMBULATORY MEDICATION CHARGE

## 2025-03-31 ENCOUNTER — HOSPITAL ENCOUNTER (OUTPATIENT)
Dept: CARDIOLOGY | Facility: HOSPITAL | Age: 50
Discharge: HOME | End: 2025-03-31
Payer: MEDICAID

## 2025-03-31 ENCOUNTER — APPOINTMENT (OUTPATIENT)
Dept: CARDIAC REHAB | Facility: HOSPITAL | Age: 50
End: 2025-03-31
Payer: MEDICAID

## 2025-03-31 DIAGNOSIS — I50.20 HFREF (HEART FAILURE WITH REDUCED EJECTION FRACTION): ICD-10-CM

## 2025-03-31 DIAGNOSIS — R00.2 PALPITATIONS: ICD-10-CM

## 2025-03-31 DIAGNOSIS — I42.8 NON-ISCHEMIC CARDIOMYOPATHY (MULTI): ICD-10-CM

## 2025-03-31 PROCEDURE — 93242 EXT ECG>48HR<7D RECORDING: CPT

## 2025-04-01 LAB
EST. AVERAGE GLUCOSE BLD GHB EST-MCNC: 140 MG/DL
EST. AVERAGE GLUCOSE BLD GHB EST-SCNC: 7.7 MMOL/L
HBA1C MFR BLD: 6.5 % OF TOTAL HGB

## 2025-04-02 ENCOUNTER — APPOINTMENT (OUTPATIENT)
Dept: CARDIAC REHAB | Facility: HOSPITAL | Age: 50
End: 2025-04-02
Payer: MEDICAID

## 2025-04-04 ENCOUNTER — APPOINTMENT (OUTPATIENT)
Dept: CARDIAC REHAB | Facility: HOSPITAL | Age: 50
End: 2025-04-04
Payer: MEDICAID

## 2025-04-07 ENCOUNTER — PHARMACY VISIT (OUTPATIENT)
Dept: PHARMACY | Facility: CLINIC | Age: 50
End: 2025-04-07
Payer: MEDICAID

## 2025-04-07 ENCOUNTER — APPOINTMENT (OUTPATIENT)
Dept: CARDIAC REHAB | Facility: HOSPITAL | Age: 50
End: 2025-04-07
Payer: MEDICAID

## 2025-04-07 ENCOUNTER — APPOINTMENT (OUTPATIENT)
Dept: CARDIOLOGY | Facility: CLINIC | Age: 50
End: 2025-04-07
Payer: MEDICAID

## 2025-04-09 ENCOUNTER — APPOINTMENT (OUTPATIENT)
Dept: CARDIAC REHAB | Facility: HOSPITAL | Age: 50
End: 2025-04-09
Payer: MEDICAID

## 2025-04-11 ENCOUNTER — APPOINTMENT (OUTPATIENT)
Dept: CARDIAC REHAB | Facility: HOSPITAL | Age: 50
End: 2025-04-11
Payer: MEDICAID

## 2025-04-14 ENCOUNTER — APPOINTMENT (OUTPATIENT)
Dept: CARDIAC REHAB | Facility: HOSPITAL | Age: 50
End: 2025-04-14
Payer: MEDICAID

## 2025-04-14 DIAGNOSIS — I50.20 HFREF (HEART FAILURE WITH REDUCED EJECTION FRACTION): ICD-10-CM

## 2025-04-14 DIAGNOSIS — I42.8 NON-ISCHEMIC CARDIOMYOPATHY (MULTI): ICD-10-CM

## 2025-04-14 DIAGNOSIS — I50.9 HEART FAILURE, UNSPECIFIED HF CHRONICITY, UNSPECIFIED HEART FAILURE TYPE: ICD-10-CM

## 2025-04-14 DIAGNOSIS — E11.00 TYPE 2 DIABETES MELLITUS WITH HYPEROSMOLARITY WITHOUT COMA, WITHOUT LONG-TERM CURRENT USE OF INSULIN (MULTI): ICD-10-CM

## 2025-04-14 RX ORDER — DULAGLUTIDE 0.75 MG/.5ML
0.75 INJECTION, SOLUTION SUBCUTANEOUS WEEKLY
Qty: 2 ML | Refills: 11 | Status: SHIPPED | OUTPATIENT
Start: 2025-04-14

## 2025-04-16 ENCOUNTER — APPOINTMENT (OUTPATIENT)
Dept: CARDIAC REHAB | Facility: HOSPITAL | Age: 50
End: 2025-04-16
Payer: MEDICAID

## 2025-04-18 ENCOUNTER — APPOINTMENT (OUTPATIENT)
Dept: CARDIAC REHAB | Facility: HOSPITAL | Age: 50
End: 2025-04-18
Payer: MEDICAID

## 2025-04-21 ENCOUNTER — APPOINTMENT (OUTPATIENT)
Dept: CARDIAC REHAB | Facility: HOSPITAL | Age: 50
End: 2025-04-21
Payer: MEDICAID

## 2025-04-23 ENCOUNTER — APPOINTMENT (OUTPATIENT)
Dept: CARDIAC REHAB | Facility: HOSPITAL | Age: 50
End: 2025-04-23
Payer: MEDICAID

## 2025-04-25 ENCOUNTER — APPOINTMENT (OUTPATIENT)
Dept: CARDIAC REHAB | Facility: HOSPITAL | Age: 50
End: 2025-04-25
Payer: MEDICAID

## 2025-04-28 ENCOUNTER — APPOINTMENT (OUTPATIENT)
Dept: CARDIAC REHAB | Facility: HOSPITAL | Age: 50
End: 2025-04-28
Payer: MEDICAID

## 2025-04-30 ENCOUNTER — APPOINTMENT (OUTPATIENT)
Dept: CARDIAC REHAB | Facility: HOSPITAL | Age: 50
End: 2025-04-30
Payer: MEDICAID

## 2025-05-01 ENCOUNTER — APPOINTMENT (OUTPATIENT)
Dept: PRIMARY CARE | Facility: CLINIC | Age: 50
End: 2025-05-01
Payer: MEDICAID

## 2025-05-02 ENCOUNTER — APPOINTMENT (OUTPATIENT)
Dept: CARDIAC REHAB | Facility: HOSPITAL | Age: 50
End: 2025-05-02
Payer: MEDICAID

## 2025-05-05 ENCOUNTER — APPOINTMENT (OUTPATIENT)
Dept: CARDIAC REHAB | Facility: HOSPITAL | Age: 50
End: 2025-05-05
Payer: MEDICAID

## 2025-05-07 ENCOUNTER — HOSPITAL ENCOUNTER (OUTPATIENT)
Dept: RADIOLOGY | Facility: HOSPITAL | Age: 50
Discharge: HOME | End: 2025-05-07
Payer: MEDICAID

## 2025-05-07 ENCOUNTER — APPOINTMENT (OUTPATIENT)
Dept: CARDIAC REHAB | Facility: HOSPITAL | Age: 50
End: 2025-05-07
Payer: MEDICAID

## 2025-05-07 VITALS — BODY MASS INDEX: 40.89 KG/M2 | WEIGHT: 202.82 LBS | HEIGHT: 59 IN

## 2025-05-07 DIAGNOSIS — I42.8 NON-ISCHEMIC CARDIOMYOPATHY (MULTI): ICD-10-CM

## 2025-05-07 DIAGNOSIS — I50.20 HFREF (HEART FAILURE WITH REDUCED EJECTION FRACTION): ICD-10-CM

## 2025-05-07 DIAGNOSIS — I50.9 HEART FAILURE, UNSPECIFIED HF CHRONICITY, UNSPECIFIED HEART FAILURE TYPE: ICD-10-CM

## 2025-05-07 PROCEDURE — 75561 CARDIAC MRI FOR MORPH W/DYE: CPT

## 2025-05-07 PROCEDURE — 2550000001 HC RX 255 CONTRASTS: Mod: JZ,SE | Performed by: INTERNAL MEDICINE

## 2025-05-07 PROCEDURE — A9575 INJ GADOTERATE MEGLUMI 0.1ML: HCPCS | Mod: JZ,SE | Performed by: INTERNAL MEDICINE

## 2025-05-07 RX ORDER — GADOTERATE MEGLUMINE 376.9 MG/ML
37 INJECTION INTRAVENOUS
Status: COMPLETED | OUTPATIENT
Start: 2025-05-07 | End: 2025-05-07

## 2025-05-07 RX ADMIN — GADOTERATE MEGLUMINE 37 ML: 376.9 INJECTION INTRAVENOUS at 09:40

## 2025-05-08 ENCOUNTER — APPOINTMENT (OUTPATIENT)
Dept: ENDOCRINOLOGY | Facility: CLINIC | Age: 50
End: 2025-05-08

## 2025-05-09 ENCOUNTER — APPOINTMENT (OUTPATIENT)
Dept: CARDIAC REHAB | Facility: HOSPITAL | Age: 50
End: 2025-05-09
Payer: MEDICAID

## 2025-05-12 ENCOUNTER — APPOINTMENT (OUTPATIENT)
Dept: CARDIAC REHAB | Facility: HOSPITAL | Age: 50
End: 2025-05-12
Payer: MEDICAID

## 2025-05-12 DIAGNOSIS — E66.812 OBESITY, CLASS II, BMI 35-39.9: Primary | ICD-10-CM

## 2025-05-12 RX ORDER — DULAGLUTIDE 1.5 MG/.5ML
1.5 INJECTION, SOLUTION SUBCUTANEOUS WEEKLY
Qty: 2 ML | Refills: 11 | Status: SHIPPED | OUTPATIENT
Start: 2025-05-12

## 2025-05-14 ENCOUNTER — APPOINTMENT (OUTPATIENT)
Dept: CARDIAC REHAB | Facility: HOSPITAL | Age: 50
End: 2025-05-14
Payer: MEDICAID

## 2025-05-16 ENCOUNTER — APPOINTMENT (OUTPATIENT)
Dept: CARDIAC REHAB | Facility: HOSPITAL | Age: 50
End: 2025-05-16
Payer: MEDICAID

## 2025-05-19 ENCOUNTER — APPOINTMENT (OUTPATIENT)
Dept: CARDIAC REHAB | Facility: HOSPITAL | Age: 50
End: 2025-05-19
Payer: MEDICAID

## 2025-05-21 ENCOUNTER — APPOINTMENT (OUTPATIENT)
Dept: CARDIAC REHAB | Facility: HOSPITAL | Age: 50
End: 2025-05-21
Payer: MEDICAID

## 2025-05-23 ENCOUNTER — APPOINTMENT (OUTPATIENT)
Dept: CARDIAC REHAB | Facility: HOSPITAL | Age: 50
End: 2025-05-23
Payer: MEDICAID

## 2025-05-23 ENCOUNTER — APPOINTMENT (OUTPATIENT)
Dept: PULMONOLOGY | Facility: CLINIC | Age: 50
End: 2025-05-23
Payer: MEDICAID

## 2025-05-23 VITALS
HEART RATE: 99 BPM | SYSTOLIC BLOOD PRESSURE: 99 MMHG | DIASTOLIC BLOOD PRESSURE: 67 MMHG | BODY MASS INDEX: 38.41 KG/M2 | WEIGHT: 188 LBS | OXYGEN SATURATION: 95 %

## 2025-05-23 DIAGNOSIS — J45.41 MODERATE PERSISTENT ASTHMA WITH EXACERBATION (HHS-HCC): ICD-10-CM

## 2025-05-23 DIAGNOSIS — I50.20 HFREF (HEART FAILURE WITH REDUCED EJECTION FRACTION): Primary | ICD-10-CM

## 2025-05-23 PROCEDURE — 3078F DIAST BP <80 MM HG: CPT | Performed by: PEDIATRICS

## 2025-05-23 PROCEDURE — 99215 OFFICE O/P EST HI 40 MIN: CPT | Performed by: PEDIATRICS

## 2025-05-23 PROCEDURE — 1036F TOBACCO NON-USER: CPT | Performed by: PEDIATRICS

## 2025-05-23 PROCEDURE — 3074F SYST BP LT 130 MM HG: CPT | Performed by: PEDIATRICS

## 2025-05-23 NOTE — PROGRESS NOTES
Subjective   Patient ID: Samina Park is a 50 y.o. female who presents for asthma    HPI    5/23/2025:  Ms Park is doing well.  She has stopped using advair for now and feels the asthma is still under control.  She is using singulair and allergies are not bothering her much at all.    Initial visit 2/19/2025:  Ms Park has a history of childhood asthma which as an adult only intermittently bothered her.  She thought she was having a flair up of her asthma but albuterol did not help which is unusual for her.  She ended up getting admitted 1/16-23 and was found to be in congestive heart failure.  With HFrEF.  Today she feels her breathing is better.  She was given advair as well, she thinks it is helping but it is hard to tell if that is because of the heart failure treatment or asthma treatment.  In the hospital there was also concern for DARELL.  She feels her sleep is better than before the admission but does not know if she stops breathing at night.    [place] today is 17     I reviewed the angio CT on admission.  There is cardiomegaly with normal lungs.     She never smoked.    Review of Systems    See scanned documents attached to this note for review of systems, and appropriate scales/scores for this visit.     Objective   Physical Exam  Constitutional:       Appearance: Normal appearance.   HENT:      Head: Normocephalic and atraumatic.      Mouth/Throat:      Pharynx: Oropharynx is clear.   Cardiovascular:      Rate and Rhythm: Normal rate and regular rhythm.      Pulses: Normal pulses.      Heart sounds: Normal heart sounds.   Pulmonary:      Effort: Pulmonary effort is normal.      Breath sounds: Normal breath sounds. No wheezing, rhonchi or rales.   Abdominal:      General: Bowel sounds are normal.      Palpations: Abdomen is soft.   Musculoskeletal:         General: Normal range of motion.   Skin:     General: Skin is warm and dry.   Neurological:      General: No focal deficit present.      Mental Status:  She is alert and oriented to person, place, and time.   Psychiatric:         Mood and Affect: Mood normal.         Assessment/Plan       50yr old with asthma, now with cardiomyopathy/CHF     Asthma:  unclear how much most recent issues are related to asthma vs CHF.  Certainly both can be contributing  - ordered advair 250/50, continue albuterol as needed  - will get PFT but will hold off until asthma and CHF are under better control. (Ordered PFT for about 3 months from now)  5/23/2025:  doing well off adviar.  Instructed to restart if symptoms return     2. CHF:  better now  - follow up with cardiology     3. DARELL:  I explained that CHF and DARELL often go hadn in hand and if there is DARELL the CHF will be much more difficult to control.  She has an appointment with Dr Evans in sleep medicine  - encouraged her to keep that appiotment  5/23/2025: follow up with Dr Evans     Follow up 6 months       James Read MD 05/23/25 7:57 AM

## 2025-05-28 ENCOUNTER — APPOINTMENT (OUTPATIENT)
Dept: CARDIAC REHAB | Facility: HOSPITAL | Age: 50
End: 2025-05-28
Payer: MEDICAID

## 2025-05-30 ENCOUNTER — APPOINTMENT (OUTPATIENT)
Dept: CARDIAC REHAB | Facility: HOSPITAL | Age: 50
End: 2025-05-30
Payer: MEDICAID

## 2025-06-02 ENCOUNTER — APPOINTMENT (OUTPATIENT)
Dept: CARDIAC REHAB | Facility: HOSPITAL | Age: 50
End: 2025-06-02
Payer: MEDICAID

## 2025-06-04 ENCOUNTER — APPOINTMENT (OUTPATIENT)
Dept: CARDIAC REHAB | Facility: HOSPITAL | Age: 50
End: 2025-06-04
Payer: MEDICAID

## 2025-06-06 ENCOUNTER — APPOINTMENT (OUTPATIENT)
Dept: PRIMARY CARE | Facility: CLINIC | Age: 50
End: 2025-06-06
Payer: MEDICAID

## 2025-06-06 ENCOUNTER — APPOINTMENT (OUTPATIENT)
Dept: CARDIAC REHAB | Facility: HOSPITAL | Age: 50
End: 2025-06-06
Payer: MEDICAID

## 2025-06-09 ENCOUNTER — APPOINTMENT (OUTPATIENT)
Dept: CARDIAC REHAB | Facility: HOSPITAL | Age: 50
End: 2025-06-09
Payer: MEDICAID

## 2025-06-11 ENCOUNTER — APPOINTMENT (OUTPATIENT)
Dept: CARDIAC REHAB | Facility: HOSPITAL | Age: 50
End: 2025-06-11
Payer: MEDICAID

## 2025-06-13 ENCOUNTER — APPOINTMENT (OUTPATIENT)
Dept: CARDIAC REHAB | Facility: HOSPITAL | Age: 50
End: 2025-06-13
Payer: MEDICAID

## 2025-06-16 ENCOUNTER — APPOINTMENT (OUTPATIENT)
Dept: CARDIAC REHAB | Facility: HOSPITAL | Age: 50
End: 2025-06-16
Payer: MEDICAID

## 2025-06-18 ENCOUNTER — APPOINTMENT (OUTPATIENT)
Dept: CARDIAC REHAB | Facility: HOSPITAL | Age: 50
End: 2025-06-18
Payer: MEDICAID

## 2025-06-18 NOTE — PROGRESS NOTES
Subjective   Patient ID: Samina Park is a 50 y.o. female who presents for No chief complaint on file..  HPI  Current Medications[1]  Patient ID: Samina Park is a 50 y.o. female who presents for Follow-up (No concerns at this time; ).     HPI      DM2- Started Trulicity yesterday 3/18/25. Fasting am blood sugars are between 90 and 120. currently taking Jardiance 10 mg and insulin 10 units nightly since January. She is on a statin. Most recent A1c 7.2 3/5/25     HTN/ CHF- Entresto, metoprolol, aldactone, lasix, aldactone. following with cardiology-Dr. Nugent.      DARELL- following with sleep medicine     Asthma/COPD- advair 250/50, continue albuterol as needed, Singulair, Combivent  Review of Systems    There were no vitals taken for this visit.     [unfilled]    Objective         Problem List Items Addressed This Visit    None    Physical Exam        Gen: No acute distress, alert and oriented x3, pleasant   HEENT: moist mucous membranes, b/l external auditory canals are clear of debris, TMs within normal limits, no oropharyngeal lesions, eomi, perrla   Neck: thyroid within normal limits, no lymphadenopathy   CV: RRR, normal S1/S2, no murmur   Resp: Clear to auscultation bilaterally, no wheezes or rhonchi appreciated  Abd: soft, nontender, non-distended, no guarding/rigidity, bowel sounds present  Extr: no edema, no calf tenderness  Derm: Skin is warm and dry, no rashes appreciated  Psych: mood is good, affect is congruent, good hygiene, normal speech and eye contact  Neuro: cranial nerves grossly intact, normal gait      Assessment/Plan   {Assess/PlanSmartLinks:67977}  Health care maintenance        DARELL (obstructive sleep apnea)       -     On trulicity       -      Working on weight loss       -     Following with sleep medicine     Type 2 diabetes mellitus with hyperosmolarity without coma, without long-term current use of insulin (Multi)  -     stop Insulin   -     start metformin, Continue Tulicity and jardiance  -      A1C ordered for 3 months  -     Continue exercising and healthier eating habits.      Asthma/ COPD        -     Continue advair 250/50, albuterol as needed, Singulair, Combivent  -     Following with Pulm         HFrEF (heart failure with reduced ejection fraction)  -      Entresto, metoprolol, aldactone, lasix, aldactone  -       following with cardiology-Dr. Izaguirre    A1C - 3/31/25 - 6.5  FRANCES - 2/11/25 - WNL  PAP - 8/12/2020  COLO -         [1]   Current Outpatient Medications:     alcohol swabs (Alcohol Pads) pads, medicated, Use 4-8 per day to check blood glucose and for injectable medications, Disp: 400 each, Rfl: 3    atorvastatin (Lipitor) 40 mg tablet, Take 1 tablet (40 mg) by mouth once daily at bedtime., Disp: 90 tablet, Rfl: 3    blood sugar diagnostic (Blood Glucose Test), Check blood glucose 4 time per day, Disp: 400 each, Rfl: 0    blood-glucose meter misc, Use to check blood glucose, Disp: 1 each, Rfl: 0    dulaglutide (Trulicity) 0.75 mg/0.5 mL pen injector, Inject 0.75 mg under the skin 1 (one) time per week., Disp: 2 mL, Rfl: 11    dulaglutide (Trulicity) 1.5 mg/0.5 mL pen injector injection, Inject 1.5 mg under the skin 1 (one) time per week., Disp: 2 mL, Rfl: 11    empagliflozin (Jardiance) 10 mg, Take 1 tablet (10 mg) by mouth once daily., Disp: 90 tablet, Rfl: 3    fluticasone propion-salmeteroL (Advair Diskus) 250-50 mcg/dose diskus inhaler, Inhale 1 puff 2 times a day. Rinse mouth with water after use., Disp: 60 each, Rfl: 11    furosemide (Lasix) 20 mg tablet, Take 1 tablet (20 mg) by mouth once daily as needed (For weight gain of more than 2 pounds in 2 days with associated leg swelling.)., Disp: 90 tablet, Rfl: 3    metFORMIN (Glucophage) 500 mg tablet, Take 1 tablet (500 mg) by mouth 2 times daily (morning and late afternoon)., Disp: 200 tablet, Rfl: 3    metoprolol succinate XL (Toprol-XL) 100 mg 24 hr tablet, Take 1 tablet (100 mg) by mouth once daily. Do not crush or chew., Disp:  "90 tablet, Rfl: 3    montelukast (Singulair) 10 mg tablet, Take 1 tablet (10 mg) by mouth once daily at bedtime., Disp: 90 tablet, Rfl: 1    pen needle, diabetic (TRUEplus Pen Needle) 32 gauge x 5/32\" needle, 1 Needle once daily., Disp: 100 each, Rfl: 0    sacubitriL-valsartan (Entresto)  mg tablet, Take 1 tablet by mouth 2 times a day., Disp: 180 tablet, Rfl: 3    spironolactone (Aldactone) 25 mg tablet, Take 1 tablet (25 mg) by mouth once daily., Disp: 90 tablet, Rfl: 3    "

## 2025-06-19 ENCOUNTER — APPOINTMENT (OUTPATIENT)
Dept: PRIMARY CARE | Facility: CLINIC | Age: 50
End: 2025-06-19
Payer: MEDICAID

## 2025-06-20 ENCOUNTER — APPOINTMENT (OUTPATIENT)
Dept: CARDIAC REHAB | Facility: HOSPITAL | Age: 50
End: 2025-06-20
Payer: MEDICAID

## 2025-06-23 ENCOUNTER — APPOINTMENT (OUTPATIENT)
Dept: CARDIAC REHAB | Facility: HOSPITAL | Age: 50
End: 2025-06-23
Payer: MEDICAID

## 2025-06-24 ENCOUNTER — APPOINTMENT (OUTPATIENT)
Dept: PRIMARY CARE | Facility: CLINIC | Age: 50
End: 2025-06-24
Payer: MEDICAID

## 2025-06-24 VITALS
SYSTOLIC BLOOD PRESSURE: 93 MMHG | HEIGHT: 59 IN | HEART RATE: 71 BPM | BODY MASS INDEX: 36.85 KG/M2 | WEIGHT: 182.8 LBS | DIASTOLIC BLOOD PRESSURE: 65 MMHG

## 2025-06-24 DIAGNOSIS — Z00.00 HEALTH CARE MAINTENANCE: ICD-10-CM

## 2025-06-24 DIAGNOSIS — F41.9 ANXIETY AND DEPRESSION: ICD-10-CM

## 2025-06-24 DIAGNOSIS — F32.A ANXIETY AND DEPRESSION: ICD-10-CM

## 2025-06-24 DIAGNOSIS — E11.69 TYPE 2 DIABETES MELLITUS WITH OTHER SPECIFIED COMPLICATION, WITHOUT LONG-TERM CURRENT USE OF INSULIN: ICD-10-CM

## 2025-06-24 DIAGNOSIS — Z20.2 POTENTIAL EXPOSURE TO STD: Primary | ICD-10-CM

## 2025-06-24 PROCEDURE — 3060F POS MICROALBUMINURIA REV: CPT

## 2025-06-24 PROCEDURE — 99214 OFFICE O/P EST MOD 30 MIN: CPT

## 2025-06-24 PROCEDURE — 3078F DIAST BP <80 MM HG: CPT

## 2025-06-24 PROCEDURE — 3074F SYST BP LT 130 MM HG: CPT

## 2025-06-24 PROCEDURE — 3008F BODY MASS INDEX DOCD: CPT

## 2025-06-24 PROCEDURE — 3048F LDL-C <100 MG/DL: CPT

## 2025-06-24 PROCEDURE — 1036F TOBACCO NON-USER: CPT

## 2025-06-24 PROCEDURE — 3051F HG A1C>EQUAL 7.0%<8.0%: CPT

## 2025-06-24 RX ORDER — DULOXETIN HYDROCHLORIDE 30 MG/1
30 CAPSULE, DELAYED RELEASE ORAL DAILY
Qty: 30 CAPSULE | Refills: 5 | Status: SHIPPED | OUTPATIENT
Start: 2025-06-24 | End: 2025-12-21

## 2025-06-24 ASSESSMENT — PATIENT HEALTH QUESTIONNAIRE - PHQ9
2. FEELING DOWN, DEPRESSED OR HOPELESS: SEVERAL DAYS
SUM OF ALL RESPONSES TO PHQ9 QUESTIONS 1 AND 2: 2
1. LITTLE INTEREST OR PLEASURE IN DOING THINGS: SEVERAL DAYS

## 2025-06-24 NOTE — PATIENT INSTRUCTIONS
[facility]:  Schedule an appointment for labs at [facility]/patient or call [phone] 24 hours a day, 7 days a week.   You can also download the [facility] lab scheduling zuleyka on your phone.     Radiology [phone]    Cardiology testing (ECHO, Stress Test, ZioPatch):  [phone]    Pulmonary Function Test:  [phone]

## 2025-06-25 ENCOUNTER — TELEPHONE (OUTPATIENT)
Dept: ENDOCRINOLOGY | Facility: CLINIC | Age: 50
End: 2025-06-25
Payer: MEDICAID

## 2025-06-25 ENCOUNTER — APPOINTMENT (OUTPATIENT)
Dept: CARDIAC REHAB | Facility: HOSPITAL | Age: 50
End: 2025-06-25
Payer: MEDICAID

## 2025-06-26 ENCOUNTER — TELEPHONE (OUTPATIENT)
Dept: ENDOCRINOLOGY | Facility: CLINIC | Age: 50
End: 2025-06-26

## 2025-06-26 ENCOUNTER — APPOINTMENT (OUTPATIENT)
Dept: ENDOCRINOLOGY | Facility: CLINIC | Age: 50
End: 2025-06-26
Payer: MEDICAID

## 2025-06-26 VITALS — BODY MASS INDEX: 36.69 KG/M2 | HEIGHT: 59 IN | WEIGHT: 182 LBS

## 2025-06-26 DIAGNOSIS — E78.00 HYPERCHOLESTEROLEMIA: ICD-10-CM

## 2025-06-26 DIAGNOSIS — E66.812 OBESITY, CLASS II, BMI 35-39.9: ICD-10-CM

## 2025-06-26 DIAGNOSIS — I50.20 HFREF (HEART FAILURE WITH REDUCED EJECTION FRACTION): ICD-10-CM

## 2025-06-26 DIAGNOSIS — E11.69 TYPE 2 DIABETES MELLITUS WITH OTHER SPECIFIED COMPLICATION, WITHOUT LONG-TERM CURRENT USE OF INSULIN: Primary | ICD-10-CM

## 2025-06-26 PROBLEM — R73.03 PREDIABETES: Status: RESOLVED | Noted: 2023-09-15 | Resolved: 2025-06-26

## 2025-06-26 PROCEDURE — 99204 OFFICE O/P NEW MOD 45 MIN: CPT | Performed by: NURSE PRACTITIONER

## 2025-06-26 PROCEDURE — 3008F BODY MASS INDEX DOCD: CPT | Performed by: NURSE PRACTITIONER

## 2025-06-26 PROCEDURE — 3048F LDL-C <100 MG/DL: CPT | Performed by: NURSE PRACTITIONER

## 2025-06-26 PROCEDURE — 3051F HG A1C>EQUAL 7.0%<8.0%: CPT | Performed by: NURSE PRACTITIONER

## 2025-06-26 PROCEDURE — 1036F TOBACCO NON-USER: CPT | Performed by: NURSE PRACTITIONER

## 2025-06-26 PROCEDURE — 3060F POS MICROALBUMINURIA REV: CPT | Performed by: NURSE PRACTITIONER

## 2025-06-26 ASSESSMENT — PATIENT HEALTH QUESTIONNAIRE - PHQ9
2. FEELING DOWN, DEPRESSED OR HOPELESS: NOT AT ALL
1. LITTLE INTEREST OR PLEASURE IN DOING THINGS: NOT AT ALL
SUM OF ALL RESPONSES TO PHQ9 QUESTIONS 1 AND 2: 0

## 2025-06-26 ASSESSMENT — ENCOUNTER SYMPTOMS
LOSS OF SENSATION IN FEET: 0
DEPRESSION: 1
OCCASIONAL FEELINGS OF UNSTEADINESS: 0

## 2025-06-26 ASSESSMENT — LIFESTYLE VARIABLES
HOW OFTEN DO YOU HAVE A DRINK CONTAINING ALCOHOL: MONTHLY OR LESS
HOW MANY STANDARD DRINKS CONTAINING ALCOHOL DO YOU HAVE ON A TYPICAL DAY: 1 OR 2
SKIP TO QUESTIONS 9-10: 1
AUDIT-C TOTAL SCORE: 1
HOW OFTEN DO YOU HAVE SIX OR MORE DRINKS ON ONE OCCASION: NEVER

## 2025-06-26 ASSESSMENT — PAIN SCALES - GENERAL: PAINLEVEL_OUTOF10: 0-NO PAIN

## 2025-06-26 NOTE — PROGRESS NOTES
"Subjective   Patient is sent at the request of Rafael Keenan for my opinion regarding Type 2 diabetes.  My final recommendations will be communicated back to the requesting provider by way of shared medical record.     Samina Park is a 50 y.o. female here today for a new patient visit regarding Type 2 diabetes. Initial diagnosis with diabetes was 1/2025.  The patient in unsure if she has a family history of diabetes.    Known complications include: CHF, HTN, Hyperlipidemia, obesity     Previously seeing PCP for diabetes care.    A1c 5.3% in 6/2025.  Previous A1c 6.5% in 3/2025.     Here today to establish care  She was referred to bariatric surgery however does not want to proceed   She has lost 30 lbs in the last year, 20 lbs more recently    She was diagnosed with sleep apnea and is seeing sleep medicine      Current diabetes regimen is as follows:   Metformin 500 mg twice daily   Jardiance 10 mg once daily   Trulicity 0.75 mg once weekly (written for 1.5 mg but was dispenses 0.75 mg at pharmacy)     The patient is not currently checking the blood glucose.  Feel out of routine of checking as of recent.     Hypoglycemia frequency: Denies  Hypoglycemia awareness: Denies      The patient comes into the office today for routine follow up.        ROS   General: no fever, chills or acute changes in weight in the last 6 months  Skin: no rashes, pruritis or dry skin  Cardiac: denies chest pain, heart palpitations or orthopnea  Pulmonary: denies wheezing, productive cough or exertional dyspnea      Objective    Physical Exam  Height (!) 1.499 m (4' 11\"), weight 82.6 kg (182 lb).  Unable to obtain blood pressure today due to virtual visit  General Appearance: Well appearing, alert, in no acute distress, well-hydrated, well nourished.  Affect: alert, cooperative       Current Medications[1]    Lab Results   Component Value Date    BILITOT 1.1 01/17/2025    CALCIUM 9.9 03/10/2025    CO2 26 03/10/2025     03/10/2025    " CREATININE 0.88 03/10/2025    GLUCOSE 115 (H) 03/10/2025    ALKPHOS 71 01/17/2025    K 4.1 03/10/2025    PROT 6.3 (L) 01/19/2025     03/10/2025    AST 18 01/17/2025    ALT 23 01/17/2025    BUN 12 03/10/2025    ANIONGAP 13 03/10/2025    MG 2.58 (H) 01/23/2025    PHOS 4.1 01/23/2025    ALBUMIN 4.2 01/23/2025     Lab Results   Component Value Date    TRIG 183 (H) 01/15/2025    CHOL 155 01/15/2025    LDLCALC 92 01/15/2025    HDL 26.8 01/15/2025     Lab Results   Component Value Date    HGBA1C 5.3 06/24/2025    HGBA1C 6.5 (H) 03/31/2025    HGBA1C 7.2 (H) 03/10/2025       The 10-year ASCVD risk score (Kevin CRUZ, et al., 2019) is: 3%    Values used to calculate the score:      Age: 50 years      Sex: Female      Is Non- : No      Diabetic: Yes      Tobacco smoker: No      Systolic Blood Pressure: 93 mmHg      Is BP treated: Yes      HDL Cholesterol: 26.8 mg/dL      Total Cholesterol: 155 mg/dL    Assessment & Plan  Type 2 diabetes mellitus with other specified complication, without long-term current use of insulin    Orders:    Referral to Endocrinology    Follow Up In Endocrinology; Future    HFrEF (heart failure with reduced ejection fraction)           Comment: A1c at goal and greatly improved.  Diagnosed with diabetes in 1/2025.  Since that time she has improved with her A1c and weight.  She is doing well with triple therapy.  Today discussed increasing Trulicity and stopping metformin.  Discussed importance of GLP1 for A1c lowering, CV risk reduction and weight loss.  She is also taking SGLT2 which is imperative for her CHF as well as A1c lowering.  Overall doing very well with her blood sugars.  If continuing to do well, ok to transition back to PCP.        Plan:   Increase Trulicity to 1.5 mg once weekly   Ok to use up current dose by taking 0.75 mg once weekly x 2 injections   The new script for Trulicity 1.5 mg is at the pharmacy for  when need refilled   Check blood sugar  couple times per week, ideally two hours after large meal, goal is less than 180  Follow up in 6 months- ok to do virtual and transition care back to PCP if still at goal.    Notify me sooner if you are not losing weight       Hypercholesterolemia         Comment:  LDL not at goal 1/2025.  She was started on statin at this time.  Will recheck labs.  Try to add on lipid to labs drawn the other day     Plan:  Continue atorvastatin 40 mg once daily   Will add on lipid to recent labs drawn          Obesity, Class II, BMI 35-39.9         BMI 36.0-36.9,adult         Comment: BMI not at goal but improving.  She is losing weight.  Will try to increase Trulicity to help drive weight further.  She continues to watch her diet.      Plan:   Increase Trulicity 1.5 mg once weekly          [1]   Current Outpatient Medications:     alcohol swabs (Alcohol Pads) pads, medicated, Use 4-8 per day to check blood glucose and for injectable medications, Disp: 400 each, Rfl: 3    atorvastatin (Lipitor) 40 mg tablet, Take 1 tablet (40 mg) by mouth once daily at bedtime., Disp: 90 tablet, Rfl: 3    blood sugar diagnostic (Blood Glucose Test), Check blood glucose 4 time per day, Disp: 400 each, Rfl: 0    blood-glucose meter misc, Use to check blood glucose, Disp: 1 each, Rfl: 0    dulaglutide (Trulicity) 1.5 mg/0.5 mL pen injector injection, Inject 1.5 mg under the skin 1 (one) time per week., Disp: 2 mL, Rfl: 11    DULoxetine (Cymbalta) 30 mg DR capsule, Take 1 capsule (30 mg) by mouth once daily. Do not crush or chew., Disp: 30 capsule, Rfl: 5    empagliflozin (Jardiance) 10 mg, Take 1 tablet (10 mg) by mouth once daily., Disp: 90 tablet, Rfl: 3    fluticasone propion-salmeteroL (Advair Diskus) 250-50 mcg/dose diskus inhaler, Inhale 1 puff 2 times a day. Rinse mouth with water after use., Disp: 60 each, Rfl: 11    furosemide (Lasix) 20 mg tablet, Take 1 tablet (20 mg) by mouth once daily as needed (For weight gain of more than 2 pounds in 2  "days with associated leg swelling.)., Disp: 90 tablet, Rfl: 3    metFORMIN (Glucophage) 500 mg tablet, Take 1 tablet (500 mg) by mouth 2 times daily (morning and late afternoon)., Disp: 200 tablet, Rfl: 3    metoprolol succinate XL (Toprol-XL) 100 mg 24 hr tablet, Take 1 tablet (100 mg) by mouth once daily. Do not crush or chew., Disp: 90 tablet, Rfl: 3    montelukast (Singulair) 10 mg tablet, Take 1 tablet (10 mg) by mouth once daily at bedtime., Disp: 90 tablet, Rfl: 1    pen needle, diabetic (TRUEplus Pen Needle) 32 gauge x 5/32\" needle, 1 Needle once daily., Disp: 100 each, Rfl: 0    sacubitriL-valsartan (Entresto)  mg tablet, Take 1 tablet by mouth 2 times a day., Disp: 180 tablet, Rfl: 3    spironolactone (Aldactone) 25 mg tablet, Take 1 tablet (25 mg) by mouth once daily., Disp: 90 tablet, Rfl: 3    "

## 2025-06-26 NOTE — Clinical Note
Thanks for referral.  She is doing great.  A1c is greatly improved.  She is down 20 lbs since May.  For some reason, she did not get the increase dose of Trulicity though it was ordered.  [facility] and they made the correction.  She will increase to 1.5 mg and then stop metformin.  Can continue to increase Trulicity for additional weight reduction if needed.  Overall doing very well.  If still at goal, I am happy to transition her care back to primary care.  Thanks! Miracle Cox, APRN-CNP

## 2025-06-27 LAB
C TRACH RRNA SPEC QL NAA+PROBE: NOT DETECTED
CHOLEST SERPL-MCNC: 209 MG/DL
CHOLEST/HDLC SERPL: 3.6 (CALC)
EST. AVERAGE GLUCOSE BLD GHB EST-MCNC: 105 MG/DL
EST. AVERAGE GLUCOSE BLD GHB EST-SCNC: 5.8 MMOL/L
HBA1C MFR BLD: 5.3 %
HDLC SERPL-MCNC: 58 MG/DL
HIV 1+2 AB+HIV1 P24 AG SERPL QL IA: NORMAL
HIV 1+2 AB+HIV1 P24 AG SERPL QL IA: NORMAL
LDLC SERPL CALC-MCNC: 120 MG/DL (CALC)
N GONORRHOEA RRNA SPEC QL NAA+PROBE: NOT DETECTED
NONHDLC SERPL-MCNC: 151 MG/DL (CALC)
QUEST GC CT AMPLIFIED (ALWAYS MESSAGE): NORMAL
T PALLIDUM AB SER QL IA: NEGATIVE
T VAGINALIS RRNA SPEC QL NAA+PROBE: NOT DETECTED
TRIGL SERPL-MCNC: 191 MG/DL

## 2025-07-01 ENCOUNTER — TELEPHONE (OUTPATIENT)
Dept: PRIMARY CARE | Facility: CLINIC | Age: 50
End: 2025-07-01
Payer: MEDICAID

## 2025-07-01 NOTE — TELEPHONE ENCOUNTER
Call patient to let her know to check her mychart messages. Let her know if she has any questions to please call back.

## 2025-07-11 DIAGNOSIS — E66.812 OBESITY, CLASS II, BMI 35-39.9: ICD-10-CM

## 2025-07-11 RX ORDER — DULAGLUTIDE 1.5 MG/.5ML
1.5 INJECTION, SOLUTION SUBCUTANEOUS WEEKLY
Qty: 2 ML | Refills: 11 | Status: SHIPPED | OUTPATIENT
Start: 2025-07-11

## 2025-07-25 ENCOUNTER — APPOINTMENT (OUTPATIENT)
Dept: CARDIOLOGY | Facility: CLINIC | Age: 50
End: 2025-07-25
Payer: MEDICAID

## 2025-09-24 ENCOUNTER — APPOINTMENT (OUTPATIENT)
Dept: PRIMARY CARE | Facility: CLINIC | Age: 50
End: 2025-09-24
Payer: MEDICAID

## 2025-10-23 ENCOUNTER — APPOINTMENT (OUTPATIENT)
Dept: CARDIOLOGY | Facility: HOSPITAL | Age: 50
End: 2025-10-23
Payer: MEDICAID

## 2025-11-25 ENCOUNTER — APPOINTMENT (OUTPATIENT)
Dept: PULMONOLOGY | Facility: CLINIC | Age: 50
End: 2025-11-25
Payer: MEDICAID

## 2025-12-02 ENCOUNTER — APPOINTMENT (OUTPATIENT)
Dept: ENDOCRINOLOGY | Facility: CLINIC | Age: 50
End: 2025-12-02
Payer: MEDICAID